# Patient Record
Sex: FEMALE | Race: WHITE
[De-identification: names, ages, dates, MRNs, and addresses within clinical notes are randomized per-mention and may not be internally consistent; named-entity substitution may affect disease eponyms.]

---

## 2018-02-22 ENCOUNTER — HOSPITAL ENCOUNTER (INPATIENT)
Dept: HOSPITAL 17 - NEPC | Age: 83
LOS: 8 days | Discharge: NURSING, REHAB OR LONG-TERM CARE | DRG: 492 | End: 2018-03-02
Attending: FAMILY MEDICINE | Admitting: FAMILY MEDICINE
Payer: MEDICARE

## 2018-02-22 VITALS — HEART RATE: 103 BPM | OXYGEN SATURATION: 98 % | RESPIRATION RATE: 18 BRPM

## 2018-02-22 VITALS
DIASTOLIC BLOOD PRESSURE: 57 MMHG | RESPIRATION RATE: 24 BRPM | TEMPERATURE: 99.2 F | HEART RATE: 95 BPM | OXYGEN SATURATION: 95 % | SYSTOLIC BLOOD PRESSURE: 124 MMHG

## 2018-02-22 VITALS
SYSTOLIC BLOOD PRESSURE: 124 MMHG | HEART RATE: 152 BPM | RESPIRATION RATE: 18 BRPM | OXYGEN SATURATION: 98 % | DIASTOLIC BLOOD PRESSURE: 89 MMHG

## 2018-02-22 VITALS
DIASTOLIC BLOOD PRESSURE: 58 MMHG | RESPIRATION RATE: 16 BRPM | SYSTOLIC BLOOD PRESSURE: 124 MMHG | HEART RATE: 87 BPM | OXYGEN SATURATION: 98 %

## 2018-02-22 VITALS — OXYGEN SATURATION: 98 %

## 2018-02-22 VITALS — OXYGEN SATURATION: 97 %

## 2018-02-22 VITALS
HEART RATE: 88 BPM | DIASTOLIC BLOOD PRESSURE: 73 MMHG | SYSTOLIC BLOOD PRESSURE: 124 MMHG | RESPIRATION RATE: 18 BRPM | OXYGEN SATURATION: 97 %

## 2018-02-22 VITALS
RESPIRATION RATE: 19 BRPM | HEART RATE: 98 BPM | OXYGEN SATURATION: 93 % | SYSTOLIC BLOOD PRESSURE: 119 MMHG | DIASTOLIC BLOOD PRESSURE: 75 MMHG

## 2018-02-22 VITALS
DIASTOLIC BLOOD PRESSURE: 104 MMHG | TEMPERATURE: 98.2 F | OXYGEN SATURATION: 98 % | SYSTOLIC BLOOD PRESSURE: 169 MMHG | HEART RATE: 89 BPM | RESPIRATION RATE: 18 BRPM

## 2018-02-22 VITALS — HEART RATE: 112 BPM

## 2018-02-22 VITALS
OXYGEN SATURATION: 97 % | HEART RATE: 84 BPM | SYSTOLIC BLOOD PRESSURE: 126 MMHG | RESPIRATION RATE: 18 BRPM | DIASTOLIC BLOOD PRESSURE: 64 MMHG

## 2018-02-22 VITALS
OXYGEN SATURATION: 98 % | RESPIRATION RATE: 19 BRPM | HEART RATE: 145 BPM | DIASTOLIC BLOOD PRESSURE: 92 MMHG | SYSTOLIC BLOOD PRESSURE: 115 MMHG

## 2018-02-22 VITALS
HEART RATE: 138 BPM | SYSTOLIC BLOOD PRESSURE: 132 MMHG | RESPIRATION RATE: 18 BRPM | DIASTOLIC BLOOD PRESSURE: 71 MMHG | OXYGEN SATURATION: 98 %

## 2018-02-22 VITALS — WEIGHT: 142.42 LBS | BODY MASS INDEX: 23.73 KG/M2 | HEIGHT: 65 IN

## 2018-02-22 VITALS
OXYGEN SATURATION: 99 % | RESPIRATION RATE: 18 BRPM | HEART RATE: 103 BPM | SYSTOLIC BLOOD PRESSURE: 132 MMHG | DIASTOLIC BLOOD PRESSURE: 72 MMHG

## 2018-02-22 VITALS — RESPIRATION RATE: 18 BRPM | HEART RATE: 138 BPM | OXYGEN SATURATION: 98 %

## 2018-02-22 VITALS
SYSTOLIC BLOOD PRESSURE: 130 MMHG | DIASTOLIC BLOOD PRESSURE: 87 MMHG | RESPIRATION RATE: 18 BRPM | OXYGEN SATURATION: 100 % | HEART RATE: 97 BPM

## 2018-02-22 VITALS
RESPIRATION RATE: 18 BRPM | SYSTOLIC BLOOD PRESSURE: 131 MMHG | DIASTOLIC BLOOD PRESSURE: 78 MMHG | HEART RATE: 151 BPM | OXYGEN SATURATION: 99 %

## 2018-02-22 DIAGNOSIS — F03.90: ICD-10-CM
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
## 2018-02-22 DIAGNOSIS — R62.7: ICD-10-CM

## 2018-02-22 DIAGNOSIS — I51.7: ICD-10-CM

## 2018-02-22 DIAGNOSIS — Z96.643: ICD-10-CM

## 2018-02-22 DIAGNOSIS — S82.202A: Primary | ICD-10-CM

## 2018-02-22 DIAGNOSIS — J81.0: ICD-10-CM

## 2018-02-22 DIAGNOSIS — Z99.3: ICD-10-CM
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
## 2018-02-22 DIAGNOSIS — S82.832A: ICD-10-CM

## 2018-02-22 DIAGNOSIS — Z78.1: ICD-10-CM
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
## 2018-02-22 DIAGNOSIS — D64.9: ICD-10-CM

## 2018-02-22 DIAGNOSIS — I48.91: ICD-10-CM

## 2018-02-22 DIAGNOSIS — R45.1: ICD-10-CM

## 2018-02-22 DIAGNOSIS — J81.1: ICD-10-CM

## 2018-02-22 DIAGNOSIS — I44.4: ICD-10-CM

## 2018-02-22 DIAGNOSIS — Z85.51: ICD-10-CM

## 2018-02-22 DIAGNOSIS — M80.062A: ICD-10-CM

## 2018-02-22 DIAGNOSIS — R13.10: ICD-10-CM

## 2018-02-22 DIAGNOSIS — K29.70: ICD-10-CM

## 2018-02-22 DIAGNOSIS — Z51.5: ICD-10-CM

## 2018-02-22 LAB
ALBUMIN SERPL-MCNC: 2.9 GM/DL (ref 3.4–5)
ALP SERPL-CCNC: 157 U/L (ref 45–117)
ALT SERPL-CCNC: 46 U/L (ref 10–53)
AST SERPL-CCNC: 46 U/L (ref 15–37)
BACTERIA #/AREA URNS HPF: (no result) /HPF
BASOPHILS # BLD AUTO: 0.1 TH/MM3 (ref 0–0.2)
BASOPHILS NFR BLD: 0.7 % (ref 0–2)
BILIRUB SERPL-MCNC: 0.6 MG/DL (ref 0.2–1)
BUN SERPL-MCNC: 18 MG/DL (ref 7–18)
CALCIUM SERPL-MCNC: 8.8 MG/DL (ref 8.5–10.1)
CHLORIDE SERPL-SCNC: 104 MEQ/L (ref 98–107)
COLOR UR: YELLOW
CREAT SERPL-MCNC: 0.69 MG/DL (ref 0.5–1)
EOSINOPHIL # BLD: 0 TH/MM3 (ref 0–0.4)
EOSINOPHIL NFR BLD: 0.3 % (ref 0–4)
ERYTHROCYTE [DISTWIDTH] IN BLOOD BY AUTOMATED COUNT: 17.1 % (ref 11.6–17.2)
GFR SERPLBLD BASED ON 1.73 SQ M-ARVRAT: 81 ML/MIN (ref 89–?)
GLUCOSE SERPL-MCNC: 114 MG/DL (ref 74–106)
GLUCOSE UR STRIP-MCNC: (no result) MG/DL
HCO3 BLD-SCNC: 28.5 MEQ/L (ref 21–32)
HCT VFR BLD CALC: 33.3 % (ref 35–46)
HGB BLD-MCNC: 10.8 GM/DL (ref 11.6–15.3)
HGB UR QL STRIP: (no result)
INR PPP: 1.1 RATIO
KETONES UR STRIP-MCNC: (no result) MG/DL
LYMPHOCYTES # BLD AUTO: 1.2 TH/MM3 (ref 1–4.8)
LYMPHOCYTES NFR BLD AUTO: 8.4 % (ref 9–44)
MAGNESIUM SERPL-MCNC: 2.2 MG/DL (ref 1.5–2.5)
MCH RBC QN AUTO: 27.3 PG (ref 27–34)
MCHC RBC AUTO-ENTMCNC: 32.4 % (ref 32–36)
MCV RBC AUTO: 84.3 FL (ref 80–100)
MONOCYTE #: 1.2 TH/MM3 (ref 0–0.9)
MONOCYTES NFR BLD: 8.3 % (ref 0–8)
MUCOUS THREADS #/AREA URNS LPF: (no result) /LPF
NEUTROPHILS # BLD AUTO: 11.4 TH/MM3 (ref 1.8–7.7)
NEUTROPHILS NFR BLD AUTO: 82.3 % (ref 16–70)
NITRITE UR QL STRIP: (no result)
PLATELET # BLD: 377 TH/MM3 (ref 150–450)
PMV BLD AUTO: 10.2 FL (ref 7–11)
PROT SERPL-MCNC: 7.9 GM/DL (ref 6.4–8.2)
PROTHROMBIN TIME: 11 SEC (ref 9.8–11.6)
RBC # BLD AUTO: 3.94 MIL/MM3 (ref 4–5.3)
SODIUM SERPL-SCNC: 138 MEQ/L (ref 136–145)
SP GR UR STRIP: 1.03 (ref 1–1.03)
SQUAMOUS #/AREA URNS HPF: <1 /HPF (ref 0–5)
TROPONIN I SERPL-MCNC: 0.08 NG/ML (ref 0.02–0.05)
URINE LEUKOCYTE ESTERASE: (no result)
WBC # BLD AUTO: 13.9 TH/MM3 (ref 4–11)

## 2018-02-22 PROCEDURE — 76000 FLUOROSCOPY <1 HR PHYS/QHP: CPT

## 2018-02-22 PROCEDURE — 73552 X-RAY EXAM OF FEMUR 2/>: CPT

## 2018-02-22 PROCEDURE — 93306 TTE W/DOPPLER COMPLETE: CPT

## 2018-02-22 PROCEDURE — 96374 THER/PROPH/DIAG INJ IV PUSH: CPT

## 2018-02-22 PROCEDURE — 85025 COMPLETE CBC W/AUTO DIFF WBC: CPT

## 2018-02-22 PROCEDURE — 73590 X-RAY EXAM OF LOWER LEG: CPT

## 2018-02-22 PROCEDURE — 85730 THROMBOPLASTIN TIME PARTIAL: CPT

## 2018-02-22 PROCEDURE — 80048 BASIC METABOLIC PNL TOTAL CA: CPT

## 2018-02-22 PROCEDURE — 87086 URINE CULTURE/COLONY COUNT: CPT

## 2018-02-22 PROCEDURE — 84484 ASSAY OF TROPONIN QUANT: CPT

## 2018-02-22 PROCEDURE — 80053 COMPREHEN METABOLIC PANEL: CPT

## 2018-02-22 PROCEDURE — 88312 SPECIAL STAINS GROUP 1: CPT

## 2018-02-22 PROCEDURE — C1713 ANCHOR/SCREW BN/BN,TIS/BN: HCPCS

## 2018-02-22 PROCEDURE — 81001 URINALYSIS AUTO W/SCOPE: CPT

## 2018-02-22 PROCEDURE — 87641 MR-STAPH DNA AMP PROBE: CPT

## 2018-02-22 PROCEDURE — 83735 ASSAY OF MAGNESIUM: CPT

## 2018-02-22 PROCEDURE — 96375 TX/PRO/DX INJ NEW DRUG ADDON: CPT

## 2018-02-22 PROCEDURE — 85014 HEMATOCRIT: CPT

## 2018-02-22 PROCEDURE — 71045 X-RAY EXAM CHEST 1 VIEW: CPT

## 2018-02-22 PROCEDURE — 88305 TISSUE EXAM BY PATHOLOGIST: CPT

## 2018-02-22 PROCEDURE — 85610 PROTHROMBIN TIME: CPT

## 2018-02-22 PROCEDURE — 93005 ELECTROCARDIOGRAM TRACING: CPT

## 2018-02-22 PROCEDURE — 82552 ASSAY OF CPK IN BLOOD: CPT

## 2018-02-22 PROCEDURE — 85018 HEMOGLOBIN: CPT

## 2018-02-22 PROCEDURE — 82550 ASSAY OF CK (CPK): CPT

## 2018-02-22 PROCEDURE — 85027 COMPLETE CBC AUTOMATED: CPT

## 2018-02-22 PROCEDURE — 72170 X-RAY EXAM OF PELVIS: CPT

## 2018-02-22 RX ADMIN — PHENYTOIN SODIUM SCH MLS/HR: 50 INJECTION INTRAMUSCULAR; INTRAVENOUS at 14:48

## 2018-02-22 RX ADMIN — Medication SCH ML: at 20:56

## 2018-02-22 RX ADMIN — MORPHINE SULFATE PRN MG: 2 INJECTION, SOLUTION INTRAMUSCULAR; INTRAVENOUS at 23:19

## 2018-02-22 RX ADMIN — PHENYTOIN SODIUM SCH MLS/HR: 50 INJECTION INTRAMUSCULAR; INTRAVENOUS at 23:18

## 2018-02-22 RX ADMIN — SODIUM CHLORIDE PRN MLS/HR: 900 INJECTION, SOLUTION INTRAVENOUS at 13:17

## 2018-02-22 RX ADMIN — STANDARDIZED SENNA CONCENTRATE AND DOCUSATE SODIUM SCH TAB: 8.6; 5 TABLET, FILM COATED ORAL at 20:56

## 2018-02-22 RX ADMIN — SODIUM CHLORIDE PRN MLS/HR: 900 INJECTION, SOLUTION INTRAVENOUS at 21:58

## 2018-02-22 NOTE — RADRPT
EXAM DATE/TIME:  02/22/2018 10:40 

 

[facility] COMPARISON:     

No previous studies available for comparison.

 

                     

INDICATIONS :     

Shortness of breath.

                     

 

MEDICAL HISTORY :     

Carcinoma, bladder.          

 

SURGICAL HISTORY :        

Bilateral hip replacement

 

ENCOUNTER:     

Initial                                        

 

ACUITY:     

1 day      

 

PAIN SCORE:     

0/10

 

LOCATION:     

Bilateral chest 

 

FINDINGS:     

The heart is enlarged.  There is engorgement and indistinctness of the central bronchopulmonary sebastian
ngs.  This is a supine view and there is equalization of flow between the upper and lower lobes.  The
re is some mild peribronchial thickening.  No focal consolidated infiltrate seen.  Both hemidiaphragm
s are well delineated.

 

CONCLUSION:     

1. No infiltrates seen.

2. Cardiomegaly and fullness of the central bronchopulmonary markings suggest pulmonary venous hypert
ension.

 

 

 

 Dylan Candelaria MD on February 22, 2018 at 11:34           

Board Certified Radiologist.

 This report was verified electronically.

## 2018-02-22 NOTE — HHI.HP
HPI


Service


Family Medicine


Primary Care Physician


Jhon Betts MD


Admission Diagnosis





Diagnoses:  


Chief Complaint:  


leg pain from fall


International Travel<30 Days:  No


Contact w/Intl Traveler<30days:  No


Known Affected Area:  No


History of Present Illness


Ms Alvarenga is an 81YO female w/PMHx of dementia who presents from [facility] 

after a fall this morning with left distal tibia and fibula fractures on 

imaging and new onset afib RVR. Prashanth Sidhu, her son-in-law and HCPOA for Ms Alvarenga, reports that she fell off of a chair this morning at the [facility]. She is 

attended in the room by her two grandchildren, Cliff and Erniqueta Sidhu who cannot 

provide any other information other than that Ms Alvarenga has PMHx of dementia dx 

around 2011, and that she is pretty healthy and does not take any medications. 

The pt is not oriented to person, place or time but states she is not in pain; 

however, when the HOB is elevated starts stating, stop doing that, it hurts. 

Heart rate on the monitor is elevated to 170s-180s. Dr Velásquez has been called 

for orthopedic consult.


 (Dheeraj Hurd MD R1)





Review of Systems


ROS Limitations:  Clinical Condition (dementia)


 (Dheeraj Hurd MD R1)





Past Family Social History


Past Medical History


dementia


FROOSH fractures of both hands/wrists several years ago


Past Surgical History


unknown


Reported Medications





Reported Meds & Active Scripts


Active


No Active Prescriptions or Reported Medications    


 (Dheeraj Hurd MD R1)


Allergies:  


Coded Allergies:  


     No Known Allergies (Unverified , 2/22/18)


Active Ordered Medications





Current Medications








 Medications


  (Trade)  Dose


 Ordered  Sig/César


 Route  Start Time


 Stop Time Status Last Admin


 


  (NS Flush)  2 ml  UNSCH  PRN


 IVF  2/22/18 10:15


     


 


 


 Diltiazem HCl 125


 mg/Sodium Chloride  125 ml @ 5


 mls/hr  TITRATE  PRN


 IV  2/22/18 12:45


   UNV  


 








Family History


unknown


Social History


Daughter and son-in-law are HCPOA


Lives in [facility]


 (Dheeraj Hurd MD R1)





Physical Exam


Vital Signs





Vital Signs








  Date Time  Temp Pulse Resp B/P (MAP) Pulse Ox O2 Delivery O2 Flow Rate FiO2


 


2/22/18 11:48  138 18  98 Room Air  


 


2/22/18 10:57  103 18  98 Room Air  


 


2/22/18 10:15     98 Room Air  


 


2/22/18 09:12 98.2 89 18 169/104 (125 98   








Physical Exam


GENERAL: This is a thin, elderly female mildly agitated in bed, constantly 

grasping at her abdomen and the bed coverings.


SKIN: No rashes, ecchymoses or lesions. Cool and dry.


HEAD: Atraumatic. Normocephalic. No temporal or scalp tenderness.


EYES: Pupils equal round and reactive. No scleral icterus. No injection or 

drainage. 


ENT: Nose without drainage. Uvula midline. Airway patent.


NECK: Trachea midline. No JVD or lymphadenopathy. Supple, nontender, no 

meningeal signs.


CARDIOVASCULAR: Tachycardic with irregularly irregular rhythm without murmur, 

gallop, or rub. 


RESPIRATORY: Clear to auscultation. Breath sounds equal bilaterally. No wheezes

, rales, or rhonchi.  


GASTROINTESTINAL: Abdomen soft, non-tender, nondistended. No hepato-splenomegaly

, or palpable masses. No guarding.


MUSCULOSKELETAL: Extremities without clubbing, cyanosis, or edema. No edema of 

RLE. LLE in boot and ace bandage to the knee. No right calf tenderness. 


NEUROLOGICAL: Awake. Not oriented. Motor and sensory grossly within normal 

limits. Can speak.


Laboratory





Laboratory Tests








Test


  2/22/18


09:35 2/22/18


11:10


 


White Blood Count 13.9  


 


Red Blood Count 3.94  


 


Hemoglobin 10.8  


 


Hematocrit 33.3  


 


Mean Corpuscular Volume 84.3  


 


Mean Corpuscular Hemoglobin 27.3  


 


Mean Corpuscular Hemoglobin


Concent 32.4 


  


 


 


Red Cell Distribution Width 17.1  


 


Platelet Count 377  


 


Mean Platelet Volume 10.2  


 


Neutrophils (%) (Auto) 82.3  


 


Lymphocytes (%) (Auto) 8.4  


 


Monocytes (%) (Auto) 8.3  


 


Eosinophils (%) (Auto) 0.3  


 


Basophils (%) (Auto) 0.7  


 


Neutrophils # (Auto) 11.4  


 


Lymphocytes # (Auto) 1.2  


 


Monocytes # (Auto) 1.2  


 


Eosinophils # (Auto) 0.0  


 


Basophils # (Auto) 0.1  


 


CBC Comment DIFF FINAL  


 


Differential Comment   


 


Urine Color YELLOW  


 


Urine Turbidity CLEAR  


 


Urine pH 5.5  


 


Urine Specific Gravity 1.028  


 


Urine Protein 30  


 


Urine Glucose (UA) NEG  


 


Urine Ketones NEG  


 


Urine Occult Blood NEG  


 


Urine Nitrite NEG  


 


Urine Bilirubin NEG  


 


Urine Urobilinogen 2.0  


 


Urine Leukocyte Esterase TRACE  


 


Urine RBC 2  


 


Urine WBC 1  


 


Urine Squamous Epithelial


Cells <1 


  


 


 


Urine Bacteria MOD  


 


Urine Mucus FEW  


 


Microscopic Urinalysis Comment


  CATH-CULTURE


IND 


 


 


Blood Urea Nitrogen 18  


 


Creatinine 0.69  


 


Random Glucose 114  


 


Total Protein 7.9  


 


Albumin 2.9  


 


Calcium Level 8.8  


 


Alkaline Phosphatase 157  


 


Aspartate Amino Transf


(AST/SGOT) 46 


  


 


 


Alanine Aminotransferase


(ALT/SGPT) 46 


  


 


 


Total Bilirubin 0.6  


 


Sodium Level 138  


 


Potassium Level 3.9  


 


Chloride Level 104  


 


Carbon Dioxide Level 28.5  


 


Anion Gap 6  


 


Estimat Glomerular Filtration


Rate 81 


  


 


 


Prothrombin Time  11.0 


 


Prothromb Time International


Ratio 


  1.1 


 


 


Activated Partial


Thromboplast Time 


  22.5 


 


 


Magnesium Level  2.2 


 


Total Creatine Kinase  247 


 


Creatine Kinase MB  2.1 


 


Creatine Kinase MB %  0.9 


 


Troponin I  0.08 














 Date/Time


Source Procedure


Growth Status


 


 


 2/22/18 09:35


Urine Catheterized Urine Urine Culture


Pending Received








 (Dheeraj Hurd MD R1)


Result Diagram:  


2/22/18 0935                                                                   

             2/22/18 0935





Imaging





Last Impressions








Chest X-Ray 2/22/18 1006 Signed





Impressions: 





 Service Date/Time:  Thursday, February 22, 2018 10:40 - CONCLUSION:  1. No 





 infiltrates seen. 2. Cardiomegaly and fullness of the central bronchopulmonary 





 markings suggest pulmonary venous hypertension.     Dylan Candelaria MD 


 


Tibia/Fibula X-Ray 2/22/18 0000 Signed





Impressions: 





 Service Date/Time:  Thursday, February 22, 2018 10:27 - CONCLUSION:  

Comminuted 





 and angulated fractures of the distal one third shaft of the tibia and fibula.

   





   Dylan Candelaria MD 


 


Pelvis X-Ray 2/22/18 0000 Signed





Impressions: 





 Service Date/Time:  Thursday, February 22, 2018 10:21 - CONCLUSION:  The bony 





 pelvic ring is grossly intact.  Symmetric appearance of bilateral total hip 





 arthroplasty.     Dylan Candelaria MD 


 


Femur X-Ray 2/22/18 0000 Signed





Impressions: 





 Service Date/Time:  Thursday, February 22, 2018 10:23 - CONCLUSION:  1. No 





 fracture seen. 2. Possible knee effusion.     Dylan Candelaria MD 








 (Dheeraj Hurd MD R1)





Septic Shock Reassessment


Septic shock perfusion:  reassessment completed


 (Dheeraj Hurd MD R1)





Caprini VTE Risk Assessment


Caprini VTE Risk Assessment:  Mod/High Risk (score >= 2)


 (Dheeraj Hurd MD R1)





Assessment and Plan


Assessment and Plan


81YO female w/PMHx dementia presents from [facility] with comminuted fractures of 

distal 1/3 of left tibia and fibula and new onset atrail fibrillation with RVR.


grandchildren are Enriqueta and Cliff Sidhu is HCPOA [phone]





PLAN:


Admit to inpatient


Orthopedics consult (Mr Velásquez) -- appreciate recs/surgical management


NPO until ortho decides on surgery


Pain control


SCD on RLE for mechanical AC until possible surgery


Cardiology consult -- appreciate recs


Diltiazem drip


Tele


Code Status


Full Code


Discussed Condition With


Seen and dw Eduar Quarles and POONAM Tuttle


 (Dheeraj Hurd MD R1)


Attending Attestation


Patient seen and examined.  Case reviewed and discussed with the resident team.

  Agree with plan of care as discussed with me and documented in the resident 

note.


she was seen in the ED by me with her 2 grandchildren who provided the history


 (Jessica Quarles MD)


Problem List:  


(1) Fracture, tibia and fibula, shaft


ICD Codes:  S82.209A - Unspecified fracture of shaft of unspecified tibia, 

initial encounter for closed fracture; S82.409A - Unspecified fracture of shaft 

of unspecified fibula, initial encounter for closed fracture


Status:  Acute


Plan:  Left Tibia and fibula x-rays with comminuted and angulated fractures of 

the distal one third shaft of the tibia and fibula.





-Orthopedics consult (Dr Velásquez) -- appreciate recs 


-NPO


-Morphine 2mg q3h IV pain 1-10


-Morphine 5mg q3h IV breakthru pain


-Zofran 4mg IV q8h PRN nausea


-Tylenol 650mg PO q6h fever


-Halina-colace 1 tab PO BID 


-PRN bowel regimen


-SCD on RLE for mechanical anticoagulation for now





(2) Atrial fibrillation with RVR


ICD Codes:  I48.91 - Unspecified atrial fibrillation


Status:  Acute


Plan:  New onset Afib with RVR with rate in 150s; likely related to tibia and 

fibula fractures as above





-ED gave Dilt 10mg once


-Dilt gtt infusion protocol


-Cardiology consult--appreciate recs





(3) Dementia


ICD Codes:  F03.90 - Unspecified dementia without behavioral disturbance


Status:  Chronic


Plan:  Stable. Takes no medications at [facility]





(4) Agitation


ICD Codes:  R45.1 - Restlessness and agitation


Status:  Acute


Plan:  Pt repeating phrase "help me, help me" while continuing to try and get up

, and grasping at bed covers





-Redirection as necessary


-Haldol 0.5mg IV once





(5) FEN/GI/PPx


Status:  Acute


Plan:  Fluids: NS IVF @110ml/hr


Electrolytes: will monitor and replete as necessary


Nutrition: NPO for now


GI: not indicated


PPx: SCD on RLE; will await surgery for further decision on chemo 

anticoagulation





CM to assist post-op


PT tomorrow to eval and treat


 (Dheeraj Hurd MD R1)





Physician Certification


2 Midnight Certification Type:  Admission for Inpatient Services


Order for Inpatient Services


The services are ordered in accordance with Medicare regulations or non-

Medicare payer requirements, as applicable.  In the case of services not 

specified as inpatient-only, they are appropriately provided as inpatient 

services in accordance with the 2-midnight benchmark.


Estimated LOS (days):  3


 days is the estimated time the patient will need to remain in the hospital, 

assuming treatment plan goals are met and no additional complications.


Post-Hospital Plan:  Not yet determined


 (Dheeraj Hurd MD R1)





Problem Qualifiers





(1) Fracture, tibia and fibula, shaft:  


Qualified Codes:  S82.202A - Unspecified fracture of shaft of left tibia, 

initial encounter for closed fracture; S82.402A - Unspecified fracture of shaft 

of left fibula, initial encounter for closed fracture


(2) Dementia:  


Qualified Codes:  F03.90 - Unspecified dementia without behavioral disturbance








Dheeraj Hurd MD R1 Feb 22, 2018 12:55


Jessica Quarles MD Feb 23, 2018 15:12

## 2018-02-22 NOTE — EKG
Date Performed: 02/22/2018       Time Performed: 10:00:12

 

PTAGE:      82 years

 

EKG:      ATRIAL FIBRILLATION WITH RAPID VENTRICULAR RESPONSE POSSIBLE RIGHT VENTRICULAR CONDUCTION D
ELAY LEFT ANTERIOR FASCICULAR BLOCK SEPTAL MYOCARDIAL INFARCTION ABNORMAL ECG 

 

NO PREVIOUS TRACING            

 

DOCTOR:   Sue Vivar  Interpretating Date/Time  02/22/2018 19:38:08

## 2018-02-22 NOTE — PD
HPI


Chief Complaint:  Fall


Time Seen by Provider:  09:55


Travel History


International Travel<30 days:  No


Contact w/Intl Traveler<30days:  No


Traveled to known affect area:  No





History of Present Illness


HPI


This patient is a demented assisted living facility patient.  She is wheelchair-

bound.  She had a fall onto her left leg today.  It happened during transfer.  

She is not able to provide any useful history or review of systems.  She does 

not have any medical problems beyond advanced dementia and takes no 

medications.  However she presents in a new onset A. fib with RVR with heart 

rate of 160.  She has left leg deformity and pain.  Duration 1 hour.  No 

alleviating factors.  No exacerbating factors.  Symptoms of moderate severity





PFSH


Past Medical History


Cancer:  Yes ([facility])


Dementia:  Yes


Diminished Hearing:  No


Immunizations Current:  No


Pregnant?:  Not Pregnant





Past Surgical History


Genitourinary Surgery:  Yes ([facility]AD[facility] CA)


Joint Replacement:  Yes (BI LAT HIP)





Social History


Alcohol Use:  No


Tobacco Use:  No


Substance Use:  Yes (PAST ETOH ABUSE)





Allergies-Medications


(Allergen,Severity, Reaction):  


Coded Allergies:  


     No Known Allergies (Unverified , 2/22/18)


Reported Meds & Prescriptions





Reported Meds & Active Scripts


Active


No Active Prescriptions or Reported Medications    








Review of Systems


ROS Limitations:  Clinical Condition, Uncooperative, Poor Historian





Physical Exam


Narrative


GENERAL: Elderly demented  well-developed patient in no apparent distress.


SKIN: Focused skin assessment reveals no rash and nodules. Skin is Warm and dry.


HEAD: Atraumatic. Normocephalic. 


EYES: Pupils equal and round. No scleral icterus. No injection or drainage. 


ENT: No nasal bleeding or discharge.  Mucous membranes pink and moist.


NECK: Trachea midline. No JVD. 


CARDIOVASCULAR: Irregularly irregular  rhythm.  No murmur appreciated.  Rate 160


RESPIRATORY: No accessory muscle use. Clear to auscultation. Breath sounds 

equal bilaterally. 


GASTROINTESTINAL: Abdomen soft, non-tender, nondistended. Hepatic and splenic 

margins not palpable. 


MUSCULOSKELETAL: Has left leg deformity.  There is crepitus of the distal 

tibia.  No open wound.  Pulses are not palpable in either foot but readily 

obtainable with Doppler bilaterally.  There is some pigment change and edema 

which look chronic in the left leg and confirmed by the .

  Left leg looks shortened and internally rotated.  no clubbing.  No cyanosis.  

No edema. 


NEUROLOGICAL: Awake and alert. No obvious cranial nerve deficits.  Motor 

grossly within normal limits. Normal speech.


PSYCHIATRIC: Appropriate mood and affect; insight and judgment poor .





Data


Data


Last Documented VS





Vital Signs








  Date Time  Temp Pulse Resp B/P (MAP) Pulse Ox O2 Delivery O2 Flow Rate FiO2


 


2/22/18 11:48  138 18  98 Room Air  


 


2/22/18 09:12 98.2       








Orders





 Orders


Complete Blood Count With Diff (2/22/18 09:40)


Comprehensive Metabolic Panel (2/22/18 09:40)


Urinalysis - C+S If Indicated (2/22/18 09:40)


Urine Culture (2/22/18 09:35)


Electrocardiogram (2/22/18 10:06)


Ckmb (Isoenzyme) Profile (2/22/18 10:06)


Magnesium (Mg) (2/22/18 10:06)


Prothrombin Time / Inr (Pt) (2/22/18 10:06)


Act Partial Throm Time (Ptt) (2/22/18 10:06)


Troponin I (2/22/18 10:06)


Chest, Single Ap (2/22/18 10:06)


Ecg Monitoring (2/22/18 10:06)


Iv Access Insert/Monitor (2/22/18 10:06)


Oximetry (2/22/18 10:06)


Sodium Chloride 0.9% Flush (Ns Flush) (2/22/18 10:15)


Diltiazem Inj (Cardizem Inj) (2/22/18 10:15)


Femur (Ap & Lat/2vws) (2/22/18 )


Pelvis, Ap Only (Routine) (2/22/18 )


Tibia/Fibula (Ap/Lat) (2/22/18 )


CKMB (2/22/18 11:10)


CKMB% (2/22/18 11:10)





Labs





Laboratory Tests








Test


  2/22/18


09:35 2/22/18


11:10


 


White Blood Count 13.9 TH/MM3  


 


Red Blood Count 3.94 MIL/MM3  


 


Hemoglobin 10.8 GM/DL  


 


Hematocrit 33.3 %  


 


Mean Corpuscular Volume 84.3 FL  


 


Mean Corpuscular Hemoglobin 27.3 PG  


 


Mean Corpuscular Hemoglobin


Concent 32.4 % 


  


 


 


Red Cell Distribution Width 17.1 %  


 


Platelet Count 377 TH/MM3  


 


Mean Platelet Volume 10.2 FL  


 


Neutrophils (%) (Auto) 82.3 %  


 


Lymphocytes (%) (Auto) 8.4 %  


 


Monocytes (%) (Auto) 8.3 %  


 


Eosinophils (%) (Auto) 0.3 %  


 


Basophils (%) (Auto) 0.7 %  


 


Neutrophils # (Auto) 11.4 TH/MM3  


 


Lymphocytes # (Auto) 1.2 TH/MM3  


 


Monocytes # (Auto) 1.2 TH/MM3  


 


Eosinophils # (Auto) 0.0 TH/MM3  


 


Basophils # (Auto) 0.1 TH/MM3  


 


CBC Comment DIFF FINAL  


 


Differential Comment   


 


Urine Color YELLOW  


 


Urine Turbidity CLEAR  


 


Urine pH 5.5  


 


Urine Specific Gravity 1.028  


 


Urine Protein 30 mg/dL  


 


Urine Glucose (UA) NEG mg/dL  


 


Urine Ketones NEG mg/dL  


 


Urine Occult Blood NEG  


 


Urine Nitrite NEG  


 


Urine Bilirubin NEG  


 


Urine Urobilinogen 2.0 MG/DL  


 


Urine Leukocyte Esterase TRACE  


 


Urine RBC 2 /hpf  


 


Urine WBC 1 /hpf  


 


Urine Squamous Epithelial


Cells <1 /hpf 


  


 


 


Urine Bacteria MOD /hpf  


 


Urine Mucus FEW /lpf  


 


Microscopic Urinalysis Comment


  CATH-CULTURE


IND 


 


 


Blood Urea Nitrogen 18 MG/DL  


 


Creatinine 0.69 MG/DL  


 


Random Glucose 114 MG/DL  


 


Total Protein 7.9 GM/DL  


 


Albumin 2.9 GM/DL  


 


Calcium Level 8.8 MG/DL  


 


Alkaline Phosphatase 157 U/L  


 


Aspartate Amino Transf


(AST/SGOT) 46 U/L 


  


 


 


Alanine Aminotransferase


(ALT/SGPT) 46 U/L 


  


 


 


Total Bilirubin 0.6 MG/DL  


 


Sodium Level 138 MEQ/L  


 


Potassium Level 3.9 MEQ/L  


 


Chloride Level 104 MEQ/L  


 


Carbon Dioxide Level 28.5 MEQ/L  


 


Anion Gap 6 MEQ/L  


 


Estimat Glomerular Filtration


Rate 81 ML/MIN 


  


 


 


Prothrombin Time  11.0 SEC 


 


Prothromb Time International


Ratio 


  1.1 RATIO 


 


 


Activated Partial


Thromboplast Time 


  22.5 SEC 


 


 


Magnesium Level  2.2 MG/DL 


 


Total Creatine Kinase  247 U/L 


 


Creatine Kinase MB  2.1 NG/ML 


 


Creatine Kinase MB %  0.9 % 


 


Troponin I  0.08 NG/ML 











[facility]


Medical Decision Making


Medical Screen Exam Complete:  Yes


Emergency Medical Condition:  Yes


Medical Record Reviewed:  Yes


Differential Diagnosis


Tibia fracture, hip fracture, pelvic fracture, new onset A. fib with RVR, SVT


Narrative Course


I have reviewed the patient's electronic medical record.  Reviewed her nursing 

home paperwork and spoke with the .  She takes no meds and has 

advanced dementia and wheelchair-bound





IV placed


Labs sent


Extended cardiac monitoring confirms A. fib with RVR


I reviewed her EKG which shows A. fib with rate in the 140s





I gave her a dose of 15 mg IV Cardizem for rate control


I have ordered in a series of x-rays





I reviewed all the x-rays.  She has a spiral fracture of the distal tib-fib





Procedure note:


I performed a closed reduction of the left leg tibia fracture.  Is very 

deformed.


Total bedside time 15 minutes for this


I discussed it with her grandson at bedside


An assistant stabilize the upper tibia while I applied traction and rotated the 

distal tibia a bit and brought it in line.


We applied a posterior long-leg splint


Pulses are able to be found with Doppler


This was tolerated well.  No anesthesia was needed.  She is very demented and 

did not appear to be in significant pain.





Patient remains in A. fib with rapid response but improved.  I reviewed her 

labs.  I discussed with medical residents will admit


I discussed with orthopedic PA who will plan for operative repair of this leg





Diagnosis





 Primary Impression:  


 Atrial fibrillation with RVR


 Additional Impressions:  


 New onset a-fib


 Fracture, tibia and fibula, shaft


 Qualified Codes:  S82.202A - Unspecified fracture of shaft of left tibia, 

initial encounter for closed fracture; S82.402A - Unspecified fracture of shaft 

of left fibula, initial encounter for closed fracture





Admitting Information


Admitting Physician Requests:  Admit


Scripts


No Active Prescriptions or Reported Meds











Per Blanca MD Feb 22, 2018 10:20

## 2018-02-22 NOTE — RADRPT
EXAM DATE/TIME:  02/22/2018 10:23 

 

[facility] COMPARISON:     

No previous studies available for comparison.

 

                     

INDICATIONS :     

Left femur pain, fall.

                     

 

MEDICAL HISTORY :     

Carcinoma, bladder.          

 

SURGICAL HISTORY :        

Bilateral hip replacements.

 

ENCOUNTER:     

Initial                                        

 

ACUITY:     

1 day      

 

PAIN SCORE:     

Non-responsive.

 

LOCATION:     

Left proximal femur

 

FINDINGS:     

Noncemented total hip arthroplasty the top position without evidence of dislocation.  Advanced degene
rative changes in the knee with narrowing of the lateral compartment and significant superior patella
r osteophytes.  The suprapatellar soft tissues are mildly prominent, nonspecific with regard to the e
ffusion.

 

CONCLUSION:     

1. No fracture seen.

2. Possible knee effusion.

 

 

 

 Dylan Candelaria MD on February 22, 2018 at 11:32           

Board Certified Radiologist.

 This report was verified electronically.

## 2018-02-22 NOTE — RADRPT
EXAM DATE/TIME:  02/22/2018 10:21 

 

[facility] COMPARISON:     

No previous studies available for comparison.

 

                     

INDICATIONS :     

Pelvic pain, fall.

                     

 

MEDICAL HISTORY :     

Carcinoma, bladder.          

 

SURGICAL HISTORY :        

Bilateral hip replacements

 

ENCOUNTER:     

Initial                                        

 

ACUITY:     

1 day      

 

PAIN SCORE:     

2/10

 

LOCATION:     

Left  pelvis

 

FINDINGS:     

Frontal view of the pelvis demonstrates the bony pelvic ring to be grossly intact.  There are bilater
al noncemented total hip arthroplasty with shortness of breath trick appearance between left and righ
t side.  The hardware appears intact.  No fracture seen.

 

 

CONCLUSION:     

The bony pelvic ring is grossly intact.  Symmetric appearance of bilateral total hip arthroplasty.

 

 

 

 Dylan Candelaria MD on February 22, 2018 at 11:28           

Board Certified Radiologist.

 This report was verified electronically.

## 2018-02-22 NOTE — RADRPT
EXAM DATE/TIME:  02/22/2018 10:27 

 

[facility] COMPARISON:     

No previous studies available for comparison.

 

                     

INDICATIONS :     

Left leg pain, fall.

                     

 

MEDICAL HISTORY :     

Carcinoma, bladder.          

 

SURGICAL HISTORY :        

Bilateral hip replacements

 

ENCOUNTER:     

Initial                                        

 

ACUITY:     

1 day      

 

PAIN SCORE:     

10/10

 

LOCATION:     

Left distal tibia

 

FINDINGS:     

One fractures of the distal and showed shaft of the tibia and fibula with mild comminution and signif
icant medial angulation of the distal fracture fragments.  No radiopaque foreign bodies.

 

CONCLUSION:     

Comminuted and angulated fractures of the distal one third shaft of the tibia and fibula.

 

 

 

 Dylan Candelaria MD on February 22, 2018 at 11:33           

Board Certified Radiologist.

 This report was verified electronically.

## 2018-02-23 VITALS — HEART RATE: 73 BPM

## 2018-02-23 VITALS
DIASTOLIC BLOOD PRESSURE: 56 MMHG | SYSTOLIC BLOOD PRESSURE: 98 MMHG | RESPIRATION RATE: 17 BRPM | OXYGEN SATURATION: 97 % | HEART RATE: 89 BPM | TEMPERATURE: 98.8 F

## 2018-02-23 VITALS
DIASTOLIC BLOOD PRESSURE: 56 MMHG | SYSTOLIC BLOOD PRESSURE: 97 MMHG | TEMPERATURE: 98.5 F | OXYGEN SATURATION: 96 % | HEART RATE: 74 BPM | RESPIRATION RATE: 14 BRPM

## 2018-02-23 VITALS — OXYGEN SATURATION: 95 %

## 2018-02-23 VITALS
SYSTOLIC BLOOD PRESSURE: 113 MMHG | HEART RATE: 116 BPM | DIASTOLIC BLOOD PRESSURE: 56 MMHG | TEMPERATURE: 98.6 F | OXYGEN SATURATION: 99 % | RESPIRATION RATE: 19 BRPM

## 2018-02-23 VITALS
OXYGEN SATURATION: 100 % | TEMPERATURE: 98.3 F | DIASTOLIC BLOOD PRESSURE: 56 MMHG | RESPIRATION RATE: 16 BRPM | SYSTOLIC BLOOD PRESSURE: 105 MMHG | HEART RATE: 90 BPM

## 2018-02-23 VITALS
OXYGEN SATURATION: 99 % | TEMPERATURE: 98.6 F | RESPIRATION RATE: 19 BRPM | SYSTOLIC BLOOD PRESSURE: 113 MMHG | DIASTOLIC BLOOD PRESSURE: 56 MMHG | HEART RATE: 87 BPM

## 2018-02-23 VITALS
HEART RATE: 83 BPM | DIASTOLIC BLOOD PRESSURE: 56 MMHG | SYSTOLIC BLOOD PRESSURE: 122 MMHG | TEMPERATURE: 97.6 F | OXYGEN SATURATION: 97 % | RESPIRATION RATE: 18 BRPM

## 2018-02-23 VITALS — OXYGEN SATURATION: 99 %

## 2018-02-23 VITALS
RESPIRATION RATE: 19 BRPM | DIASTOLIC BLOOD PRESSURE: 60 MMHG | SYSTOLIC BLOOD PRESSURE: 129 MMHG | HEART RATE: 87 BPM | TEMPERATURE: 98.1 F | OXYGEN SATURATION: 97 %

## 2018-02-23 VITALS — HEART RATE: 116 BPM

## 2018-02-23 VITALS — HEART RATE: 82 BPM

## 2018-02-23 VITALS — HEART RATE: 94 BPM

## 2018-02-23 LAB
ALBUMIN SERPL-MCNC: 2.5 GM/DL (ref 3.4–5)
ALP SERPL-CCNC: 133 U/L (ref 45–117)
ALT SERPL-CCNC: 31 U/L (ref 10–53)
AST SERPL-CCNC: 27 U/L (ref 15–37)
BASOPHILS # BLD AUTO: 0.1 TH/MM3 (ref 0–0.2)
BASOPHILS NFR BLD: 1 % (ref 0–2)
BILIRUB SERPL-MCNC: 0.8 MG/DL (ref 0.2–1)
BUN SERPL-MCNC: 13 MG/DL (ref 7–18)
CALCIUM SERPL-MCNC: 8.2 MG/DL (ref 8.5–10.1)
CHLORIDE SERPL-SCNC: 107 MEQ/L (ref 98–107)
CREAT SERPL-MCNC: 0.59 MG/DL (ref 0.5–1)
EOSINOPHIL # BLD: 0.1 TH/MM3 (ref 0–0.4)
EOSINOPHIL NFR BLD: 1.3 % (ref 0–4)
ERYTHROCYTE [DISTWIDTH] IN BLOOD BY AUTOMATED COUNT: 17.1 % (ref 11.6–17.2)
GFR SERPLBLD BASED ON 1.73 SQ M-ARVRAT: 98 ML/MIN (ref 89–?)
GLUCOSE SERPL-MCNC: 92 MG/DL (ref 74–106)
HCO3 BLD-SCNC: 26.7 MEQ/L (ref 21–32)
HCT VFR BLD CALC: 27 % (ref 35–46)
HCT VFR BLD CALC: 28.8 % (ref 35–46)
HGB BLD-MCNC: 8.6 GM/DL (ref 11.6–15.3)
HGB BLD-MCNC: 9.3 GM/DL (ref 11.6–15.3)
LYMPHOCYTES # BLD AUTO: 1.6 TH/MM3 (ref 1–4.8)
LYMPHOCYTES NFR BLD AUTO: 18.3 % (ref 9–44)
MCH RBC QN AUTO: 27.3 PG (ref 27–34)
MCHC RBC AUTO-ENTMCNC: 32.4 % (ref 32–36)
MCV RBC AUTO: 84.2 FL (ref 80–100)
MONOCYTE #: 1 TH/MM3 (ref 0–0.9)
MONOCYTES NFR BLD: 10.9 % (ref 0–8)
NEUTROPHILS # BLD AUTO: 6.2 TH/MM3 (ref 1.8–7.7)
NEUTROPHILS NFR BLD AUTO: 68.5 % (ref 16–70)
PLATELET # BLD: 302 TH/MM3 (ref 150–450)
PMV BLD AUTO: 10.2 FL (ref 7–11)
PROT SERPL-MCNC: 6.6 GM/DL (ref 6.4–8.2)
RBC # BLD AUTO: 3.42 MIL/MM3 (ref 4–5.3)
SODIUM SERPL-SCNC: 139 MEQ/L (ref 136–145)
WBC # BLD AUTO: 9 TH/MM3 (ref 4–11)

## 2018-02-23 PROCEDURE — 0QSH06Z REPOSITION LEFT TIBIA WITH INTRAMEDULLARY INTERNAL FIXATION DEVICE, OPEN APPROACH: ICD-10-PCS | Performed by: ORTHOPAEDIC SURGERY

## 2018-02-23 RX ADMIN — Medication SCH ML: at 08:18

## 2018-02-23 RX ADMIN — Medication SCH ML: at 20:25

## 2018-02-23 RX ADMIN — PHENYTOIN SODIUM SCH MLS/HR: 50 INJECTION INTRAMUSCULAR; INTRAVENOUS at 13:42

## 2018-02-23 RX ADMIN — STANDARDIZED SENNA CONCENTRATE AND DOCUSATE SODIUM SCH TAB: 8.6; 5 TABLET, FILM COATED ORAL at 20:17

## 2018-02-23 RX ADMIN — CALCIUM CARBONATE-CHOLECALCIFEROL TAB 250 MG-125 UNIT SCH MG: 250-125 TAB at 18:00

## 2018-02-23 RX ADMIN — PHENYTOIN SODIUM SCH MLS/HR: 50 INJECTION INTRAMUSCULAR; INTRAVENOUS at 07:13

## 2018-02-23 RX ADMIN — OXYTOCIN SCH MLS/HR: 10 INJECTION, SOLUTION INTRAMUSCULAR; INTRAVENOUS at 19:47

## 2018-02-23 RX ADMIN — CEFAZOLIN SODIUM SCH MLS/HR: 2 SOLUTION INTRAVENOUS at 17:00

## 2018-02-23 RX ADMIN — MORPHINE SULFATE PRN MG: 2 INJECTION, SOLUTION INTRAMUSCULAR; INTRAVENOUS at 23:48

## 2018-02-23 RX ADMIN — STANDARDIZED SENNA CONCENTRATE AND DOCUSATE SODIUM SCH TAB: 8.6; 5 TABLET, FILM COATED ORAL at 08:18

## 2018-02-23 RX ADMIN — CEFAZOLIN SODIUM SCH MLS/HR: 2 SOLUTION INTRAVENOUS at 23:48

## 2018-02-23 RX ADMIN — OXYTOCIN SCH MLS/HR: 10 INJECTION, SOLUTION INTRAMUSCULAR; INTRAVENOUS at 10:05

## 2018-02-23 RX ADMIN — CALCIUM CARBONATE-CHOLECALCIFEROL TAB 250 MG-125 UNIT SCH MG: 250-125 TAB at 13:00

## 2018-02-23 RX ADMIN — SODIUM CHLORIDE PRN MLS/HR: 900 INJECTION, SOLUTION INTRAVENOUS at 19:48

## 2018-02-23 NOTE — HHI.HP
[facility]


Service


Family Medicine


Primary Care Physician


Jhon Betts MD


Admission Diagnosis





Diagnoses:  


(1) Fracture, tibia and fibula, shaft


Diagnosis:  Principal





(2) Atrial fibrillation with RVR


Diagnosis:  Principal





(3) Dementia


Diagnosis:  Principal





(4) Agitation


Diagnosis:  Principal





(5) FEN/GI/PPx


Diagnosis:  Principal





International Travel<30 Days:  No


Contact w/Intl Traveler<30days:  No


Known Affected Area:  No


History of Present Illness


Ms Alvarenga is an 83YO female w/PMHx of dementia who presented from [facility] after a fall  with left distal tibia and fibula fractures on imaging and 

new onset afib RVR. Prashanth Sidhu, her son-in-law and HCPOA for Ms Alvarenga, 

reports that she fell off of a chair this morning at the [facility]. She is attended 

in the room by her two grandchildren, Cliff and Enriqueta Sidhu who cannot provide 

any other information other than that Ms Alvarenga has PMHx of dementia dx around 

, and that she is pretty healthy and does not take any medications. The pt 

is not oriented to person, place or time but states she is not in pain; however

, when the HOB is elevated starts yelling, "stop doing that, it hurts". Heart 

rate on the monitor in the ED was elevated to 170s-180s. Dr Velásquez was called 

for orthopedic consult.


She was seen this afternoon after her surgery and she is still sleeping/sedated 

but resting quietly. Her heart rate is in the 70s and she is very comfortable 

now.  History obtained from her family as above.





Review of Systems


ROS Limitations:  Clinical Condition





Past Family Social History


Past Medical History


dementia


FROOSH fractures of both hands/wrists several years ago


Past Surgical History


unknown


Allergies:  


Coded Allergies:  


     No Known Allergies (Unverified , 18)


Family History


unknown


pt unable to provide


Social History


Daughter and son-in-law are HCPOA


Lives in [facility]





Physical Exam


Vital Signs





Vital Signs








  Date Time  Temp Pulse Resp B/P (MAP) Pulse Ox O2 Delivery O2 Flow Rate FiO2


 


18 08:01 98.9 94 15 111/60 (77) 95   


 


18 08:01     96 Nasal Cannula 4 


 


18 08:01  94      


 


18 08:00  82      


 


18 08:00 98.1 87 19 129/60 (83) 97   


 


18 07:00     95 Nasal Cannula 2.00 


 


18 06:01  92      


 


18 06:00  82      


 


18 04:00  89      


 


18 04:00 98.8 89 17 98/56 (70) 97   


 


18 02:00  73      


 


18 02:00  73      


 


18 00:00 98.5 74 14 97/56 (70) 96   


 


18 00:00  74      


 


18 00:00  74      


 


18 22:00  112      


 


18 21:58        


 


18 21:58  88  126/76    


 


18 21:50 99.2 95 24 124/57 (79) 95   


 


18 21:50     95 Nasal Cannula 2.00 


 


18 21:01  87 16 124/58 (80) 98 Nasal Cannula 2.00 


 


18 20:58     97 Nasal Cannula 2.00 


 


18 20:39  88 18 124/73 (90) 97 Nasal Cannula 2.00 


 


18 19:11  84 18 126/64 (84) 97 Nasal Cannula 2.00 


 


18 18:45  89  126/64    


 


18 17:34  98 19 119/75 (90) 93 Nasal Cannula 2.00 


 


18 16:47  97 18 130/87 (101) 100 Room Air  


 


18 16:20  103 18 132/72 (92) 99   


 


18 14:56  138 18 132/71 (91) 98 Room Air  


 


18 14:49  145 19 115/92 (100) 98 Room Air  


 


18 14:24  151 18 131/78 (95) 99 Room Air  


 


18 13:52  152 18 124/89 (101) 98 Room Air  


 


18 13:17  153  122/79    


 


18 11:48  138 18  98 Room Air  


 


18 10:57  103 18  98 Room Air  


 


18 10:15     98 Room Air  








Physical Exam


GENERAL: This is a thin, elderly female resting calmly in bed. she is mouth 

breathing with the nasal canula next to her mouth at this time


SKIN: No rashes, ecchymoses or lesions. Cool and dry.


HEAD: Atraumatic. Normocephalic. 


EYES: Pupils equal round and reactive. No scleral icterus. No injection or 

drainage. 


ENT: Nose without drainage. Uvula midline. Airway patent.


NECK: Trachea midline. No JVD or lymphadenopathy. Supple, nontender, no 

meningeal signs.


CARDIOVASCULAR: Tachycardic with irregularly irregular rhythm without murmur, 

gallop, or rub. 


RESPIRATORY: Clear to auscultation. Breath sounds equal bilaterally. No wheezes

, rales, or rhonchi.  


GASTROINTESTINAL: Abdomen soft, non-tender, nondistended. No hepato-splenomegaly

, or palpable masses. No guarding.


MUSCULOSKELETAL: Extremities without clubbing, cyanosis, or edema. No edema of 

RLE. LLE in boot and ace bandage to the knee. No right calf tenderness. 


NEUROLOGICAL: Awake. Not oriented. Motor and sensory grossly within normal 

limits. cannot cooperate with extended exam Can speak.


Laboratory





Laboratory Tests








Test


  18


11:10 18


03:40


 


Prothrombin Time 11.0  


 


Prothromb Time International


Ratio 1.1 


  


 


 


Activated Partial


Thromboplast Time 22.5 


  


 


 


Magnesium Level 2.2  


 


Total Creatine Kinase 247  


 


Creatine Kinase MB 2.1  


 


Creatine Kinase MB % 0.9  


 


Troponin I 0.08  0.06 


 


White Blood Count  9.0 


 


Red Blood Count  3.42 


 


Hemoglobin  9.3 


 


Hematocrit  28.8 


 


Mean Corpuscular Volume  84.2 


 


Mean Corpuscular Hemoglobin  27.3 


 


Mean Corpuscular Hemoglobin


Concent 


  32.4 


 


 


Red Cell Distribution Width  17.1 


 


Platelet Count  302 


 


Mean Platelet Volume  10.2 


 


Neutrophils (%) (Auto)  68.5 


 


Lymphocytes (%) (Auto)  18.3 


 


Monocytes (%) (Auto)  10.9 


 


Eosinophils (%) (Auto)  1.3 


 


Basophils (%) (Auto)  1.0 


 


Neutrophils # (Auto)  6.2 


 


Lymphocytes # (Auto)  1.6 


 


Monocytes # (Auto)  1.0 


 


Eosinophils # (Auto)  0.1 


 


Basophils # (Auto)  0.1 


 


CBC Comment  DIFF FINAL 


 


Differential Comment   


 


Blood Urea Nitrogen  13 


 


Creatinine  0.59 


 


Random Glucose  92 


 


Total Protein  6.6 


 


Albumin  2.5 


 


Calcium Level  8.2 


 


Alkaline Phosphatase  133 


 


Aspartate Amino Transf


(AST/SGOT) 


  27 


 


 


Alanine Aminotransferase


(ALT/SGPT) 


  31 


 


 


Total Bilirubin  0.8 


 


Sodium Level  139 


 


Potassium Level  3.8 


 


Chloride Level  107 


 


Carbon Dioxide Level  26.7 


 


Anion Gap  5 


 


Estimat Glomerular Filtration


Rate 


  98 


 














 Date/Time


Source Procedure


Growth Status


 


 


 18 09:35


Urine Catheterized Urine Urine Culture


Pending Received








Result Diagram:  


18 0340                                                                   

             18 0340





Imaging





Last Impressions








Chest X-Ray 18 1006 Signed





Impressions: 





 Service Date/Time:   10:40 - CONCLUSION:  1. No 





 infiltrates seen. 2. Cardiomegaly and fullness of the central bronchopulmonary 





 markings suggest pulmonary venous hypertension.     Dylan Candelaria MD 


 


Tibia/Fibula X-Ray 18 0000 Signed





Impressions: 





 Service Date/Time:   10:27 - CONCLUSION:  

Comminuted 





 and angulated fractures of the distal one third shaft of the tibia and fibula.

   





   Dylan Candelaria MD 


 


Pelvis X-Ray 18 0000 Signed





Impressions: 





 Service Date/Time:   10:21 - CONCLUSION:  The bony 





 pelvic ring is grossly intact.  Symmetric appearance of bilateral total hip 





 arthroplasty.     Dylan Candelaria MD 


 


Femur X-Ray 18 0000 Signed





Impressions: 





 Service Date/Time:   10:23 - CONCLUSION:  1. No 





 fracture seen. 2. Possible knee effusion.     Thomas J. Yuschok, MD Caprini VTE Risk Assessment


Caprini VTE Risk Assessment:  Mod/High Risk (score >= 2)


Caprini Risk Assessment Model











 Point Value = 1          Point Value = 2  Point Value = 3  Point Value = 5


 


Age 41-60


Minor surgery


BMI > 25 kg/m2


Swollen legs


Varicose veins


Pregnancy or postpartum


History of unexplained or recurrent


   spontaneous 


Oral contraceptives or hormone


   replacement


Sepsis (< 1 month)


Serious lung disease, including


   pneumonia (< 1 month)


Abnormal pulmonary function


Acute myocardial infarction


Congestive heart failure (< 1 month)


History of inflammatory bowel disease


Medical patient at bed rest Age 61-74


Arthroscopic surgery


Major open surgery (> 45 min)


Laparoscopic surgery (> 45 min)


Malignancy


Confined to bed (> 72 hours)


Immobilizing plaster cast


Central venous access Age >= 75


History of VTE


Family history of VTE


Factor V Leiden


Prothrombin 50626F


Lupus anticoagulant


Anticardiolipin antibodies


Elevated serum homocysteine


Heparin-induced thrombocytopenia


Other congenital or acquired


   thrombophilia Stroke (< 1 month)


Elective arthroplasty


Hip, pelvis, or leg fracture


Acute spinal cord injury (< 1 month)








Prophylaxis Regimen











   Total Risk


Factor Score Risk Level Prophylaxis Regimen


 


0-1      Low Early ambulation


 


2 Moderate Order ONE of the following:


*Sequential Compression Device (SCD)


*Heparin 5000 units SQ BID


 


3-4 Higher Order ONE of the following medications:


*Heparin 5000 units SQ TID


*Enoxaparin/Lovenox 40 mg SQ daily (WT < 150 kg, CrCl > 30 mL/min)


*Enoxaparin/Lovenox 30 mg SQ daily (WT < 150 kg, CrCl > 10-29 mL/min)


*Enoxaparin/Lovenox 30 mg SQ BID (WT < 150 kg, CrCl > 30 mL/min)


AND/OR


*Sequential Compression Device (SCD)


 


5 or more Highest Order ONE of the following medications:


*Heparin 5000 units SQ TID (Preferred with Epidurals)


*Enoxaparin/Lovenox 40 mg SQ daily (WT < 150 kg, CrCl > 30 mL/min)


*Enoxaparin/Lovenox 30 mg SQ daily (WT < 150 kg, CrCl > 10-29 mL/min)


*Enoxaparin/Lovenox 30 mg SQ BID (WT < 150 kg, CrCl > 30 mL/min)


AND


*Sequential Compression Device (SCD)











Assessment and Plan


Assessment and Plan


83YO female w/PMHx dementia presents from [facility] with comminuted fractures of 

distal 1/3 of left tibia and fibula and new onset atrail fibrillation with RVR.


grandchildren are Enriqueta and Cliff Sidhu is [facility] [phone]





PLAN:


Admitted to inpatient


Orthopedics consult (Mr Velásquez) -- appreciate recs/surgical management


Pain control


SCD on RLE for mechanical AC until possible surgery


Cardiology consult -- appreciate recs


Diltiazem drip


Tele


Problem List:  


(1) Fracture, tibia and fibula, shaft


ICD Codes:  S82.209A - Unspecified fracture of shaft of unspecified tibia, 

initial encounter for closed fracture; S82.409A - Unspecified fracture of shaft 

of unspecified fibula, initial encounter for closed fracture


Status:  Acute


Plan:  Left Tibia and fibula x-rays with comminuted and angulated fractures of 

the distal one third shaft of the tibia and fibula.





-Orthopedics consult (Dr Velásquez) -- appreciate recs 


-NPO


-Morphine 2mg q3h IV pain 1-10


-Morphine 5mg q3h IV breakthru pain


-Zofran 4mg IV q8h PRN nausea


-Tylenol 650mg PO q6h fever


-Halina-colace 1 tab PO BID 


-PRN bowel regimen


-SCD on RLE for mechanical anticoagulation for now





(2) Atrial fibrillation with RVR


ICD Codes:  I48.91 - Unspecified atrial fibrillation


Status:  Acute


Plan:  New onset Afib with RVR with rate in 150s to 170s initially, unknown how 

long she has had this





-ED gave Dilt 10mg once


-Dilt gtt infusion protocol


-Cardiology consult--appreciate recs


-can consider changing to po tomorrow depending on echocardiogram and EF, 

diltiazem would be good for continued rate control





(3) Dementia


ICD Codes:  F03.90 - Unspecified dementia without behavioral disturbance


Status:  Chronic


Plan:  Stable. Takes no medications at [facility]





(4) Agitation


ICD Codes:  R45.1 - Restlessness and agitation


Status:  Acute


Plan:  Pt repeating phrase "help me, help me" while continuing to try and get up

, and grasping at bed covers





-Redirection as necessary


-Haldol 0.5mg IV once


suspect she was in pain when she was admitted as she is better today. can see 

how she does overnight





(5) FEN/GI/PPx


Status:  Acute


Plan:  Fluids: NS IVF @110ml/hr, if she takes good po can heplock


Electrolytes: will monitor and replete as necessary


Nutrition: NPO for now


GI: not indicated


PPx: SCD on RLE; will await surgery for further decision on chemo 

anticoagulation. often anticoagulation is started 24 hours after surgery





CM to assist post-op


PT tomorrow to eval and treat








Problem Qualifiers





(1) Fracture, tibia and fibula, shaft:  


Qualified Codes:  S82.202A - Unspecified fracture of shaft of left tibia, 

initial encounter for closed fracture; S82.402A - Unspecified fracture of shaft 

of left fibula, initial encounter for closed fracture


(2) Dementia:  


Qualified Codes:  F03.90 - Unspecified dementia without behavioral disturbance








Jessica Quarles MD 2018 10:08

## 2018-02-23 NOTE — PD.ORT.PN
Subjective


Subjective Remarks


POD 0 s/p IMN left tibia


stable in PACU





Objective


Vitals





Vital Signs








  Date Time  Temp Pulse Resp B/P (MAP) Pulse Ox O2 Delivery O2 Flow Rate FiO2


 


2/23/18 08:01 98.9 94 15 111/60 (77) 95   


 


2/23/18 08:01     96 Nasal Cannula 4 


 


2/23/18 08:01  94      


 


2/23/18 08:00  82      


 


2/23/18 08:00 98.1 87 19 129/60 (83) 97   


 


2/23/18 07:00     95 Nasal Cannula 2.00 


 


2/23/18 06:01  92      


 


2/23/18 06:00  82      


 


2/23/18 04:00  89      


 


2/23/18 04:00 98.8 89 17 98/56 (70) 97   


 


2/23/18 02:00  73      


 


2/23/18 02:00  73      


 


2/23/18 00:00 98.5 74 14 97/56 (70) 96   


 


2/23/18 00:00  74      


 


2/23/18 00:00  74      


 


2/22/18 22:00  112      


 


2/22/18 21:58        


 


2/22/18 21:58  88  126/76    


 


2/22/18 21:50 99.2 95 24 124/57 (79) 95   


 


2/22/18 21:50     95 Nasal Cannula 2.00 


 


2/22/18 21:01  87 16 124/58 (80) 98 Nasal Cannula 2.00 


 


2/22/18 20:58     97 Nasal Cannula 2.00 


 


2/22/18 20:39  88 18 124/73 (90) 97 Nasal Cannula 2.00 


 


2/22/18 19:11  84 18 126/64 (84) 97 Nasal Cannula 2.00 


 


2/22/18 18:45  89  126/64    


 


2/22/18 17:34  98 19 119/75 (90) 93 Nasal Cannula 2.00 


 


2/22/18 16:47  97 18 130/87 (101) 100 Room Air  


 


2/22/18 16:20  103 18 132/72 (92) 99   


 


2/22/18 14:56  138 18 132/71 (91) 98 Room Air  


 


2/22/18 14:49  145 19 115/92 (100) 98 Room Air  


 


2/22/18 14:24  151 18 131/78 (95) 99 Room Air  


 


2/22/18 13:52  152 18 124/89 (101) 98 Room Air  


 


2/22/18 13:17  153  122/79    


 


2/22/18 11:48  138 18  98 Room Air  


 


2/22/18 10:57  103 18  98 Room Air  














I/O      


 


 2/22/18 2/22/18 2/22/18 2/23/18 2/23/18 2/23/18





 07:00 15:00 23:00 07:00 15:00 23:00


 


      


 


# Voids    3  


 


# Bowel Movements    1  








Result Diagram:  


2/23/18 0340                                                                   

             2/23/18 0340





Other Results





Laboratory Tests








Test


  2/22/18


11:10


 


Prothromb Time International


Ratio 1.1 RATIO 


 


 


Prothrombin Time


  11.0 SEC


(9.8-11.6)








Imaging





Last 24 hours Impressions








Chest X-Ray 2/22/18 1006 Signed





Impressions: 





 Service Date/Time:  Thursday, February 22, 2018 10:40 - CONCLUSION:  1. No 





 infiltrates seen. 2. Cardiomegaly and fullness of the central bronchopulmonary 





 markings suggest pulmonary venous hypertension.     Dylan Candelaria MD 








Objective Remarks


LLE: dressings clean and dry. intact. nvi





Assessment & Plan


Assessment and Plan


1) Left Distal 1/3 Tibia and fibula shaft fxs s/p IMN by Dr Velásquez on 2/23/18 - 

POD 0


   -NWB LLE


   -daily dressing changes to begin POD 2 with xeroform/4x4/ACE. 


   -CM for rehab placement


   -DVt prophylaxis


   -Scripts on chart


   -f/u with [facility] or PA in 2 weeks











Cesario Latham/First Assist PA Feb 23, 2018 10:45

## 2018-02-23 NOTE — PD.ORT.PN
Subjective


Subjective Remarks


[facility]


demented. unable to answer qxs





Objective


Vitals





Vital Signs








  Date Time  Temp Pulse Resp B/P (MAP) Pulse Ox O2 Delivery O2 Flow Rate FiO2


 


2/23/18 06:01  92      


 


2/23/18 06:00  82      


 


2/23/18 04:00  89      


 


2/23/18 04:00 98.8 89 17 98/56 (70) 97   


 


2/23/18 02:00  73      


 


2/23/18 02:00  73      


 


2/23/18 00:00 98.5 74 14 97/56 (70) 96   


 


2/23/18 00:00  74      


 


2/23/18 00:00  74      


 


2/22/18 22:00  112      


 


2/22/18 21:58        


 


2/22/18 21:58  88  126/76    


 


2/22/18 21:50 99.2 95 24 124/57 (79) 95   


 


2/22/18 21:50     95 Nasal Cannula 2.00 


 


2/22/18 21:01  87 16 124/58 (80) 98 Nasal Cannula 2.00 


 


2/22/18 20:58     97 Nasal Cannula 2.00 


 


2/22/18 20:39  88 18 124/73 (90) 97 Nasal Cannula 2.00 


 


2/22/18 19:11  84 18 126/64 (84) 97 Nasal Cannula 2.00 


 


2/22/18 18:45  89  126/64    


 


2/22/18 17:34  98 19 119/75 (90) 93 Nasal Cannula 2.00 


 


2/22/18 16:47  97 18 130/87 (101) 100 Room Air  


 


2/22/18 16:20  103 18 132/72 (92) 99   


 


2/22/18 14:56  138 18 132/71 (91) 98 Room Air  


 


2/22/18 14:49  145 19 115/92 (100) 98 Room Air  


 


2/22/18 14:24  151 18 131/78 (95) 99 Room Air  


 


2/22/18 13:52  152 18 124/89 (101) 98 Room Air  


 


2/22/18 13:17  153  122/79    


 


2/22/18 11:48  138 18  98 Room Air  


 


2/22/18 10:57  103 18  98 Room Air  


 


2/22/18 10:15     98 Room Air  


 


2/22/18 09:12 98.2 89 18 169/104 (125) 98   














I/O      


 


 2/22/18 2/22/18 2/22/18 2/23/18 2/23/18 2/23/18





 07:00 15:00 23:00 07:00 15:00 23:00


 


      


 


# Voids    3  


 


# Bowel Movements    1  








Result Diagram:  


2/23/18 0340                                                                   

             2/23/18 0340





Other Results





Laboratory Tests








Test


  2/22/18


11:10


 


Prothromb Time International


Ratio 1.1 RATIO 


 


 


Prothrombin Time


  11.0 SEC


(9.8-11.6)








Imaging





Last 24 hours Impressions








Chest X-Ray 2/22/18 1006 Signed





Impressions: 





 Service Date/Time:  Thursday, February 22, 2018 10:40 - CONCLUSION:  1. No 





 infiltrates seen. 2. Cardiomegaly and fullness of the central bronchopulmonary 





 markings suggest pulmonary venous hypertension.     Dylan Candelaria MD 








Objective Remarks


LLE: +long leg splint. intact.





Assessment & Plan


Assessment and Plan


1) Left Distal 1/3 Tibia and fibula shaft fxs


   -NPO


   -consents


   -surgery today with Dr Jeffery or Cesario Rebollar/First Assist PA Feb 23, 2018 07:03

## 2018-02-23 NOTE — PD.CARD.PN
Subjective


Subjective Remarks


post op, sedated





Objective


Medications





Current Medications








 Medications


  (Trade)  Dose


 Ordered  Sig/César


 Route  Start Time


 Stop Time Status Last Admin


 


 Diltiazem HCl 125


 mg/Sodium Chloride  125 ml @ 5


 mls/hr  TITRATE  PRN


 IV  2/22/18 12:45


    2/22/18 21:58


 


 


 Sodium Chloride  1,000 ml @ 


 110 mls/hr  Q9H6M


 IV  2/22/18 13:01


    2/22/18 23:18


 


 


  (NS Flush)  2 ml  UNSCH  PRN


 IV FLUSH  2/22/18 13:15


     


 


 


  (NS Flush)  2 ml  BID


 IV FLUSH  2/22/18 21:00


     


 


 


  (Tylenol)  650 mg  Q4H  PRN


 PO  2/22/18 13:15


     


 


 


  (Zofran Inj)  4 mg  Q6H  PRN


 IVP  2/22/18 13:15


     


 


 


  (Narcan Inj)  0.4 mg  UNSCH  PRN


 IV PUSH  2/22/18 13:15


     


 


 


  (Halina-Colace)  1 tab  BID


 PO  2/22/18 21:00


     


 


 


  (Milk Of


 Magnesia Liq)  30 ml  Q12H  PRN


 PO  2/22/18 13:15


     


 


 


  (Senokot)  17.2 mg  Q12H  PRN


 PO  2/22/18 13:15


     


 


 


  (Dulcolax Supp)  10 mg  DAILY  PRN


 RECTAL  2/22/18 13:15


     


 


 


  (Lactulose Liq)  30 ml  DAILY  PRN


 PO  2/22/18 13:15


     


 


 


  (Morphine Inj)  2 mg  Q3H  PRN


 IV PUSH  2/22/18 13:15


    2/22/18 23:19


 


 


 Lactated Ringer's  1,000 ml @ 


 100 mls/hr  Q10H


 IV  2/23/18 10:05


     


 


 


  (Lovenox Inj)  30 mg  Q24H


 SQ  2/23/18 10:15


   UNV  


 


 


 Cefazolin Sodium/


 Dextrose  50 ml @ 


 100 mls/hr  Q8H


 IV  2/23/18 10:15


 2/24/18 02:44 UNV  


 


 


 Vancomycin HCl


 1000 mg/Sodium


 Chloride  250 ml @ 


 250 mls/hr  Q12H


 IV  2/23/18 10:15


 2/23/18 11:14 UNV  


 


 


  (Norco  7.5-325


 Mg)  1 tab  Q3H  PRN


 PO  2/23/18 10:15


     


 


 


  (Oscal-D 250-125)  250 mg  TID


 PO  2/23/18 13:00


     


 


 


  (Benadryl)  25 mg  Q6H  PRN


 PO  2/23/18 10:15


     


 


 


  (Morphine Inj)  2 mg  Q3H  PRN


 IV PUSH  2/23/18 10:45


     


 


 


  (Drisdol)  50,000 units  Q7D


 PO  2/23/18 11:00


     


 


 


  (Vitamin D3)  1,000 units  DAILY


 PO  2/24/18 09:00


     


 


 


  (Vitamin C)  1,000 mg  DAILY


 PO  2/24/18 09:00


     


 


 


 Miscellaneous


 Information  ALL


 NURSING


 DEPARTME...  UNSCH  PRN


 .XX  2/23/18 11:45


 2/24/18 11:44   


 








Vital Signs / I&O





Vital Signs








  Date Time  Temp Pulse Resp B/P (MAP) Pulse Ox O2 Delivery O2 Flow Rate FiO2


 


2/23/18 08:01 98.9 94 15 111/60 (77) 95   


 


2/23/18 08:01     96 Nasal Cannula 4 


 


2/23/18 08:01  94      


 


2/23/18 08:00  82      


 


2/23/18 08:00 98.1 87 19 129/60 (83) 97   


 


2/23/18 07:00     95 Nasal Cannula 2.00 


 


2/23/18 06:01  92      


 


2/23/18 06:00  82      


 


2/23/18 04:00  89      


 


2/23/18 04:00 98.8 89 17 98/56 (70) 97   


 


2/23/18 02:00  73      


 


2/23/18 02:00  73      


 


2/23/18 00:00 98.5 74 14 97/56 (70) 96   


 


2/23/18 00:00  74      


 


2/23/18 00:00  74      


 


2/22/18 22:00  112      


 


2/22/18 21:58        


 


2/22/18 21:58  88  126/76    


 


2/22/18 21:50 99.2 95 24 124/57 (79) 95   


 


2/22/18 21:50     95 Nasal Cannula 2.00 


 


2/22/18 21:01  87 16 124/58 (80) 98 Nasal Cannula 2.00 


 


2/22/18 20:58     97 Nasal Cannula 2.00 


 


2/22/18 20:39  88 18 124/73 (90) 97 Nasal Cannula 2.00 


 


2/22/18 19:11  84 18 126/64 (84) 97 Nasal Cannula 2.00 


 


2/22/18 18:45  89  126/64    


 


2/22/18 17:34  98 19 119/75 (90) 93 Nasal Cannula 2.00 


 


2/22/18 16:47  97 18 130/87 (101) 100 Room Air  


 


2/22/18 16:20  103 18 132/72 (92) 99   


 


2/22/18 14:56  138 18 132/71 (91) 98 Room Air  


 


2/22/18 14:49  145 19 115/92 (100) 98 Room Air  


 


2/22/18 14:24  151 18 131/78 (95) 99 Room Air  


 


2/22/18 13:52  152 18 124/89 (101) 98 Room Air  


 


2/22/18 13:17  153  122/79    














I/O      


 


 2/22/18 2/22/18 2/22/18 2/23/18 2/23/18 2/23/18





 07:00 15:00 23:00 07:00 15:00 23:00


 


      


 


# Voids    3  


 


# Bowel Movements    1  








Physical Exam


GENERAL: 


SKIN: Warm and dry.


HEAD: Normocephalic.


EYES: No scleral icterus. No injection or drainage. 


NECK: Supple, trachea midline. No JVD or lymphadenopathy.


CARDIOVASCULAR: Regular rate and rhythm without murmurs, gallops, or rubs. 


RESPIRATORY: Breath sounds equal bilaterally. No accessory muscle use.


GASTROINTESTINAL: Abdomen soft, non-tender, nondistended. 


MUSCULOSKELETAL: No cyanosis, or edema. 


BACK: Nontender without obvious deformity. No CVA tenderness.





Laboratory





Laboratory Tests








Test


  2/23/18


03:40 2/23/18


10:40


 


White Blood Count 9.0 TH/MM3  


 


Red Blood Count 3.42 MIL/MM3  


 


Hemoglobin 9.3 GM/DL  8.6 GM/DL 


 


Hematocrit 28.8 %  27.0 % 


 


Mean Corpuscular Volume 84.2 FL  


 


Mean Corpuscular Hemoglobin 27.3 PG  


 


Mean Corpuscular Hemoglobin


Concent 32.4 % 


  


 


 


Red Cell Distribution Width 17.1 %  


 


Platelet Count 302 TH/MM3  


 


Mean Platelet Volume 10.2 FL  


 


Neutrophils (%) (Auto) 68.5 %  


 


Lymphocytes (%) (Auto) 18.3 %  


 


Monocytes (%) (Auto) 10.9 %  


 


Eosinophils (%) (Auto) 1.3 %  


 


Basophils (%) (Auto) 1.0 %  


 


Neutrophils # (Auto) 6.2 TH/MM3  


 


Lymphocytes # (Auto) 1.6 TH/MM3  


 


Monocytes # (Auto) 1.0 TH/MM3  


 


Eosinophils # (Auto) 0.1 TH/MM3  


 


Basophils # (Auto) 0.1 TH/MM3  


 


CBC Comment DIFF FINAL  


 


Differential Comment   


 


Blood Urea Nitrogen 13 MG/DL  


 


Creatinine 0.59 MG/DL  


 


Random Glucose 92 MG/DL  


 


Total Protein 6.6 GM/DL  


 


Albumin 2.5 GM/DL  


 


Calcium Level 8.2 MG/DL  


 


Alkaline Phosphatase 133 U/L  


 


Aspartate Amino Transf


(AST/SGOT) 27 U/L 


  


 


 


Alanine Aminotransferase


(ALT/SGPT) 31 U/L 


  


 


 


Total Bilirubin 0.8 MG/DL  


 


Sodium Level 139 MEQ/L  


 


Potassium Level 3.8 MEQ/L  


 


Chloride Level 107 MEQ/L  


 


Carbon Dioxide Level 26.7 MEQ/L  


 


Anion Gap 5 MEQ/L  


 


Estimat Glomerular Filtration


Rate 98 ML/MIN 


  


 


 


Troponin I 0.06 NG/ML  











Assessment and Plan


Problem List:  


(1) New onset a-fib


ICD Codes:  I48.91 - Unspecified atrial fibrillation


Status:  Acute


(2) Atrial fibrillation with RVR


ICD Codes:  I48.91 - Unspecified atrial fibrillation


Status:  Acute


(3) Dementia


ICD Codes:  F03.90 - Unspecified dementia without behavioral disturbance


Status:  Chronic


(4) Fracture, tibia and fibula, shaft


ICD Codes:  S82.209A - Unspecified fracture of shaft of unspecified tibia, 

initial encounter for closed fracture; S82.409A - Unspecified fracture of shaft 

of unspecified fibula, initial encounter for closed fracture


Status:  Acute


Assessment and Plan


1.) Afib- rate controlled, pod #0, ortho surgery, ac indeterminate due to 

dementia, will consult case ,management to assist in determining a surrogate





Problem Qualifiers





(1) Fracture, tibia and fibula, shaft:  


Qualified Codes:  S82.202A - Unspecified fracture of shaft of left tibia, 

initial encounter for closed fracture; S82.402A - Unspecified fracture of shaft 

of left fibula, initial encounter for closed fracture








Jayden Lama MD Feb 23, 2018 12:13

## 2018-02-23 NOTE — RADRPT
EXAM DATE/TIME:  02/23/2018 09:57 

 

[facility] COMPARISON:     

TIBIA/FIBULA LEFT (AP/LAT), February 22, 2018, 10:27.

 

                     

INDICATIONS :     

IM braydon left tib fib.

                     

 

MEDICAL HISTORY :     

Carcinoma, bladder.       Fractures tibia and fibula.   

 

SURGICAL HISTORY :        

Bilateral hip replacements

 

ENCOUNTER:     

Subsequent                                        

 

ACUITY:     

2 days      

 

PAIN SCORE:     

Non-responsive.

 

LOCATION:     

Left  Tib Fib.

 

FINDINGS:     

Multiple intraoperative fluoroscopic images demonstrate interval medullary braydon and screw fixation of 
the tibia. Hardware is well-positioned. There is near-anatomic alignment with slight medial and poste
rior displacement of the distal fragment. Distal fibular fracture now appears nearly anatomic in ali
nment. No additional new acute fractures.

 

CONCLUSION:     

1. Status post left tibial IM braydon fixation, as above.

 

 

 

 Bennie Barger MD on February 23, 2018 at 19:01           

Board Certified Radiologist.

 This report was verified electronically.

## 2018-02-23 NOTE — PD.OP
cc:   Michael Abraham MD


__________________________________________________





Operative Report


Date of Surgery:  Feb 23, 2018


Preoperative Diagnosis:  


Displaced left tibia shaft fracture


Postoperative Diagnosis:  


Procedure:


Reduction and intramedullary nail fixation left tibia


Anesthesia:


Gen.


Surgeon:


Michael Abraham


Assistant(s):


MARCELLE Padilla PA-C


 


The surgical procedure was assisted by my physician assistant. My P.A. presence 

was necessary throughout this case for the manipulation and positioning of the 

surgical extremity. My P.A. was assisting me throughout the duration of this 

procedure. The skill set of a physician assistant was medically necessary to 

complete this procedure. During the surgical case the surgical tech was working 

at the back table and the physician assistant was directly assisting me.


Operation and Findings:


Implants: ITS [10]mm x [345]mm tibial nail





Plan of activity: Nonweightbearing





Patient was seen and examined preoperatively.  An informed consent was obtained 

from patient after detailed discussion of risk and benefits.  Risks of surgery 

include bleeding, infection, painful hardware, nonunion, malunion, leg length 

discrepancy, need for hardware removal, and medical complications associated 

with anesthesia including blood clots, stroke, heart attack, and death were 

discussed.  Operative site was marked.  Patient was brought to the operating 

room placed on or table.  Patient received IV antibiotics and was given IV 

sedation GETA.  Operative leg was prepped with alcohol Hibiclens and draped in 

usual sterile fashion.  Timeout procedure was performed





Procedure began with reduction of fracture.  2 small incisions were made around 

the fracture site.  A percutaneous clamp was placed.  Traction was applied.  

Fracture was reduced.  There was comminution of the fracture.  The fracture 

reduced and excellent alignment was achieved.  Fracture clamp was used to aid 

in reduction.  Next a 3 cm incision was made proximal to the patella.  

Quadriceps tendon was split in line with fibers.  Cannulas were placed in the 

patellofemoral joint to protect the articular surface at all times.  A guidepin 

was placed into the tibia and advanced in the tibial canal.  Fluoroscopy was 

used to confirm appropriate guidepin placement.  An opening reamer was used to 

open the tibial canal.  A ball-tipped guidewire was advanced down the tibial 

canal.  Guidepin was passed across the fracture site into the center of the 

distal tibia.  Fluoroscopy confirmed guidepin placement.  The nail length was 

now measured.  The fracture was now held in a reduced position and the canal 

was reamed.  The canal was reamed up to appropriate size.  A ITS nail was now 

selected.  Next the nail was fully seated.  Using perfect [place] technique 3 

distal interlocking screws were placed.  Using the insertion handle as a guide 

2 proximal interlocking screws were placed.  Fluoroscopy confirmed excellent of 

fracture with well-placed hardware.  Incisions and the knee joint were 

thoroughly irrigated with sterile saline.  Fascia was closed with #1 Vicryl, 

subcutaneous tissues closed with 3-0 Vicryl and skin was closed with staples.  

Sterile dressings were applied.  Patient was awakened and transferred to 

recovery in stable condition.











Michael Abraham MD Feb 23, 2018 10:10

## 2018-02-23 NOTE — MB
cc:

BRITNEY PONCE

****

 

 

DATE OF CONSULTATION

2/23/2018

 

REASON FOR CONSULTATION

Left tibia fracture.

 

CONSULTING PHYSICIAN

Dr. Quarles

 

HISTORY

Chayito is an 82-year-old female who lives in an [facility].  She had a fall off of a

chair this morning.  She presented to the emergency room where x-rays revealed

a displaced left distal tibia-fibula fracture.  She is currently on the

orthopedic floor.  She is awake, but has significant dementia.  Her only

complaint is her left leg.  She is unable to give any other history secondary

to dementia.

 

PAST MEDICAL HISTORY

 

ILLNESSES

1.  Dementia

2.  Osteoporosis

 

MEDICATIONS

Please see EMR for a complete list of inpatient medications.  This was

reviewed.

 

ALLERGIES

None

 

FAMILY HISTORY

Unobtainable secondary to dementia.

 

SOCIAL HISTORY

The patient lives in [facility].  Her son-in-law is

health care power of .

 

REVIEW OF SYSTEMS

Unobtainable secondary to dementia.

 

 

 

 

PHYSICAL EXAMINATION

The patient is a thin 82-year female.  She is awake but confused. She is in no

acute distress.

VITAL SIGNS:  Temperature 98.9, pulse 94, respirations 15, blood pressure

111/60, O2 sat 95% on four liters nasal cannula.

HEAD:  The patient is normocephalic.

EYES, EARS, NOSE AND THROAT:  Pupils are equal.

NECK:  Soft and nontender.  Trachea is midline.

ABDOMEN:  Soft, nontender, nondistended.

EXTREMITIES:  Examination of bilateral upper extremities reveals no pain with

shoulder or wrist motion.  She has good cap refill in all fingers.  Skin is

intact to both hands.  Sensation is intact in both hands.  Examination of right

leg reveals no obvious pain or deformity with hip, knee or ankle motion.  Skin

is intact.  Dorsalis pedis pulses palpable.  Sensation is intact. Examination

of the left leg reveals no tenderness around her hip or knee.  She is diffusely

tender around the calf.  Calf and thigh compartments are soft.  She has good

cap refill in her foot.  Dorsalis pedis pulses are palpable.

 

X-RAYS

X-rays of the left tibia were reviewed.  X-rays reveal a displaced left distal

tibial shaft fracture.

 

LABORATORY DATA

White blood cell count is 9.0, hemoglobin is 9.3 and hematocrit is 28.8.  INR

is 1.1.

 

BUN is 13, creatinine is 0.59.

 

IMPRESSION

1. Dementia

2. Osteoporosis

3. Left distal tibia-fibula shaft fractures.

 

PLAN

Treatment options were discussed with the patient, as well as her son-in-law

who is power of .  I discussed treatment options including closed

treatment with casting versus surgical treatment with open reduction and

intramedullary nail fixation.  Risks and benefits of both procedures were

discussed in-depth with the patient and her son-in-law.  At this point, my

recommendation would be surgery for reduction, intramedullary nail fixation of

left tibia.  I am concerned that she would develop wound or skin problems in a

cast and will likely develop malalignment of the fracture.  The risks of

surgery include bleeding, infection, injury to arteries, nerves and blood

vessels, nonunion, malunion, painful hardware, as well as medical complications

including blood clot, stroke, heart attack and death.  All questions were

answered.  I will plan on surgery today.

 

A mid-level provider in my office, nurse practitioner or PA, may see this

patient on a follow-up basis and continue to implement the objective of this

plan including: Starting or adjusting medications, injections of muscle,

tendon, bursa or joints, cast application, orthotic or brace application,

physical therapy, further radiographic studies including x-ray, MRI, CT,

ultrasounds or bone scan, vascular studies, neurologic studies, or other

specialist consultations, and proceeding with surgical management as

appropriate.

 

 

 

 

                              _________________________________

                              MD NATALIE Padron/MERNA

D:  2/23/2018/9:08 AM

T:  2/23/2018/12:59 PM

Visit #:  [id number]

Job #:  [id number]

## 2018-02-24 VITALS
OXYGEN SATURATION: 99 % | TEMPERATURE: 97.8 F | HEART RATE: 85 BPM | RESPIRATION RATE: 18 BRPM | DIASTOLIC BLOOD PRESSURE: 57 MMHG | SYSTOLIC BLOOD PRESSURE: 105 MMHG

## 2018-02-24 VITALS
OXYGEN SATURATION: 94 % | DIASTOLIC BLOOD PRESSURE: 52 MMHG | SYSTOLIC BLOOD PRESSURE: 93 MMHG | RESPIRATION RATE: 17 BRPM | HEART RATE: 95 BPM | TEMPERATURE: 98 F

## 2018-02-24 VITALS
HEART RATE: 93 BPM | RESPIRATION RATE: 21 BRPM | DIASTOLIC BLOOD PRESSURE: 84 MMHG | SYSTOLIC BLOOD PRESSURE: 123 MMHG | OXYGEN SATURATION: 98 % | TEMPERATURE: 98.1 F

## 2018-02-24 VITALS — HEART RATE: 96 BPM

## 2018-02-24 VITALS
TEMPERATURE: 98.7 F | HEART RATE: 116 BPM | SYSTOLIC BLOOD PRESSURE: 91 MMHG | DIASTOLIC BLOOD PRESSURE: 50 MMHG | RESPIRATION RATE: 25 BRPM | OXYGEN SATURATION: 98 %

## 2018-02-24 VITALS
SYSTOLIC BLOOD PRESSURE: 120 MMHG | TEMPERATURE: 99 F | RESPIRATION RATE: 20 BRPM | OXYGEN SATURATION: 92 % | DIASTOLIC BLOOD PRESSURE: 76 MMHG | HEART RATE: 116 BPM

## 2018-02-24 VITALS — OXYGEN SATURATION: 97 %

## 2018-02-24 VITALS — HEART RATE: 86 BPM

## 2018-02-24 VITALS
RESPIRATION RATE: 18 BRPM | TEMPERATURE: 99.9 F | SYSTOLIC BLOOD PRESSURE: 114 MMHG | DIASTOLIC BLOOD PRESSURE: 58 MMHG | HEART RATE: 90 BPM | OXYGEN SATURATION: 98 %

## 2018-02-24 VITALS — HEART RATE: 99 BPM

## 2018-02-24 VITALS — HEART RATE: 81 BPM

## 2018-02-24 VITALS — HEART RATE: 90 BPM

## 2018-02-24 VITALS — HEART RATE: 116 BPM

## 2018-02-24 LAB
BACTERIA #/AREA URNS HPF: (no result) /HPF
BASOPHILS # BLD AUTO: 0.1 TH/MM3 (ref 0–0.2)
BASOPHILS NFR BLD: 1.1 % (ref 0–2)
BUN SERPL-MCNC: 11 MG/DL (ref 7–18)
CALCIUM SERPL-MCNC: 8.2 MG/DL (ref 8.5–10.1)
CHLORIDE SERPL-SCNC: 105 MEQ/L (ref 98–107)
COLOR UR: YELLOW
CREAT SERPL-MCNC: 0.67 MG/DL (ref 0.5–1)
EOSINOPHIL # BLD: 0.1 TH/MM3 (ref 0–0.4)
EOSINOPHIL NFR BLD: 0.9 % (ref 0–4)
ERYTHROCYTE [DISTWIDTH] IN BLOOD BY AUTOMATED COUNT: 16.9 % (ref 11.6–17.2)
GFR SERPLBLD BASED ON 1.73 SQ M-ARVRAT: 84 ML/MIN (ref 89–?)
GLUCOSE SERPL-MCNC: 88 MG/DL (ref 74–106)
GLUCOSE UR STRIP-MCNC: (no result) MG/DL
HCO3 BLD-SCNC: 26 MEQ/L (ref 21–32)
HCT VFR BLD CALC: 24.4 % (ref 35–46)
HGB BLD-MCNC: 8.2 GM/DL (ref 11.6–15.3)
HGB UR QL STRIP: (no result)
KETONES UR STRIP-MCNC: 40 MG/DL
LYMPHOCYTES # BLD AUTO: 1.3 TH/MM3 (ref 1–4.8)
LYMPHOCYTES NFR BLD AUTO: 12.9 % (ref 9–44)
MCH RBC QN AUTO: 28 PG (ref 27–34)
MCHC RBC AUTO-ENTMCNC: 33.5 % (ref 32–36)
MCV RBC AUTO: 83.5 FL (ref 80–100)
MONOCYTE #: 1 TH/MM3 (ref 0–0.9)
MONOCYTES NFR BLD: 9.2 % (ref 0–8)
MUCOUS THREADS #/AREA URNS LPF: (no result) /LPF
NEUTROPHILS # BLD AUTO: 7.9 TH/MM3 (ref 1.8–7.7)
NEUTROPHILS NFR BLD AUTO: 75.9 % (ref 16–70)
NITRITE UR QL STRIP: (no result)
PLATELET # BLD: 283 TH/MM3 (ref 150–450)
PMV BLD AUTO: 9.9 FL (ref 7–11)
RBC # BLD AUTO: 2.92 MIL/MM3 (ref 4–5.3)
SODIUM SERPL-SCNC: 138 MEQ/L (ref 136–145)
SP GR UR STRIP: 1.02 (ref 1–1.03)
SQUAMOUS #/AREA URNS HPF: <1 /HPF (ref 0–5)
TRANS CELLS #/AREA URNS HPF: <1 /HPF
URINE LEUKOCYTE ESTERASE: (no result)
WBC # BLD AUTO: 10.4 TH/MM3 (ref 4–11)

## 2018-02-24 RX ADMIN — Medication SCH ML: at 09:00

## 2018-02-24 RX ADMIN — OXYCODONE HYDROCHLORIDE AND ACETAMINOPHEN SCH MG: 500 TABLET ORAL at 09:00

## 2018-02-24 RX ADMIN — HALOPERIDOL PRN MG: 5 INJECTION INTRAMUSCULAR at 12:26

## 2018-02-24 RX ADMIN — HALOPERIDOL PRN MG: 5 INJECTION INTRAMUSCULAR at 20:12

## 2018-02-24 RX ADMIN — VITAMIN D, TAB 1000IU (100/BT) SCH UNITS: 25 TAB at 09:00

## 2018-02-24 RX ADMIN — CEFAZOLIN SODIUM SCH MLS/HR: 2 SOLUTION INTRAVENOUS at 09:40

## 2018-02-24 RX ADMIN — MORPHINE SULFATE PRN MG: 2 INJECTION, SOLUTION INTRAMUSCULAR; INTRAVENOUS at 20:12

## 2018-02-24 RX ADMIN — SODIUM CHLORIDE SCH MLS/HR: 900 INJECTION INTRAVENOUS at 20:20

## 2018-02-24 RX ADMIN — OXYTOCIN SCH MLS/HR: 10 INJECTION, SOLUTION INTRAMUSCULAR; INTRAVENOUS at 09:40

## 2018-02-24 RX ADMIN — ENOXAPARIN SODIUM SCH MG: 30 INJECTION SUBCUTANEOUS at 09:40

## 2018-02-24 RX ADMIN — STANDARDIZED SENNA CONCENTRATE AND DOCUSATE SODIUM SCH TAB: 8.6; 5 TABLET, FILM COATED ORAL at 09:00

## 2018-02-24 RX ADMIN — MORPHINE SULFATE PRN MG: 2 INJECTION, SOLUTION INTRAMUSCULAR; INTRAVENOUS at 02:45

## 2018-02-24 RX ADMIN — CALCIUM CARBONATE-CHOLECALCIFEROL TAB 250 MG-125 UNIT SCH MG: 250-125 TAB at 17:32

## 2018-02-24 RX ADMIN — SODIUM CHLORIDE PRN MLS/HR: 900 INJECTION, SOLUTION INTRAVENOUS at 14:21

## 2018-02-24 RX ADMIN — MORPHINE SULFATE PRN MG: 2 INJECTION, SOLUTION INTRAMUSCULAR; INTRAVENOUS at 10:28

## 2018-02-24 RX ADMIN — MORPHINE SULFATE PRN MG: 2 INJECTION, SOLUTION INTRAMUSCULAR; INTRAVENOUS at 15:00

## 2018-02-24 RX ADMIN — CALCIUM CARBONATE-CHOLECALCIFEROL TAB 250 MG-125 UNIT SCH MG: 250-125 TAB at 13:00

## 2018-02-24 RX ADMIN — STANDARDIZED SENNA CONCENTRATE AND DOCUSATE SODIUM SCH TAB: 8.6; 5 TABLET, FILM COATED ORAL at 21:00

## 2018-02-24 RX ADMIN — Medication SCH ML: at 21:00

## 2018-02-24 RX ADMIN — CALCIUM CARBONATE-CHOLECALCIFEROL TAB 250 MG-125 UNIT SCH MG: 250-125 TAB at 09:00

## 2018-02-24 NOTE — HHI.FPPN
Subjective


Remarks


Nursing reports that the patient is not eating or drinking anything.  She is 

not following commands.  Nursing reports that the patient keeps removing her 

oxygen mask which causes her to desat.


Her breakfast remains untouched.


Patient unable to provide any history.


 (Will Tuttle MD, R3)





Objective


Vitals





Vital Signs








  Date Time  Temp Pulse Resp B/P (MAP) Pulse Ox O2 Delivery O2 Flow Rate FiO2


 


2/24/18 07:59     97 Nasal Cannula 4.00 


 


2/24/18 06:00  81      


 


2/24/18 04:00 98.0 95 17 93/52 (66) 94   


 


2/24/18 04:00  95      


 


2/24/18 02:00  99      


 


2/24/18 00:00  116      


 


2/24/18 00:00 98.7 99 25 91/50 (64) 98   


 


2/23/18 22:00  116      


 


2/23/18 20:11     95 Nasal Cannula 2.00 


 


2/23/18 20:00  116      


 


2/23/18 20:00 98.6 87 19 113/56 (75) 99   


 


2/23/18 19:48  112  116/63    


 


2/23/18 19:00     99 Nasal Cannula 3.00 


 


2/23/18 19:00 98.6 87 19 113/56 (75) 99   


 


2/23/18 17:19     99 Nasal Cannula 2.00 


 


2/23/18 16:00 97.6 83 18 122/56 (78) 97   


 


2/23/18 12:00 98.3 90 16 105/56 (72) 100   


 


2/23/18 11:14  72 18 98/58 (71) 98 Nasal Cannula 3 


 


2/23/18 11:00  73 18 96/55 (69) 98 Nasal Cannula 3 














I/O      


 


 2/23/18 2/23/18 2/23/18 2/24/18 2/24/18 2/24/18





 07:00 15:00 23:00 07:00 15:00 23:00


 


Intake Total  700 ml  300 ml  


 


Output Total  300 ml 400 ml 800 ml  


 


Balance  400 ml -400 ml -500 ml  


 


      


 


Intake IV Total    300 ml  


 


Other  700 ml    


 


Output Urine Total  200 ml 400 ml 800 ml  


 


Estimated Blood Loss  100 ml    


 


# Voids 3     


 


# Bowel Movements 1     








 (Will Tuttle MD, R3)


Result Diagram:  


2/24/18 0437 2/24/18 0437





Objective Remarks


GENERAL: This is a thin, elderly female lying in bed.  No acute distress.  Does 

not respond to questions.


SKIN: No rashes, ecchymoses or lesions. 


HEAD: Atraumatic. Normocephalic. 


EYES:  No scleral icterus. No injection or drainage. 


ENT: Nose without drainage.


NECK: Trachea midline. No JVD or lymphadenopathy.  no meningeal signs.


CARDIOVASCULAR: Tachycardic with irregularly irregular rhythm without murmur, 

gallop, or rub. 


RESPIRATORY: Clear to auscultation. Breath sounds equal bilaterally. No wheezes

, rales, or rhonchi.  


GASTROINTESTINAL: Abdomen soft, non-tender, nondistended. No hepato-splenomegaly

, or palpable masses. No guarding.


MUSCULOSKELETAL: Extremities without clubbing, cyanosis, or edema. No edema of 

RLE. LLE in boot and ace bandage to the knee. No right calf tenderness. 


NEUROLOGICAL: Awake. Not oriented. Motor and sensory grossly within normal 

limits. cannot cooperate


 (Will Tuttle MD, R3)





A/P


Assessment and Plan


81YO female w/PMHx dementia presents from [facility] with comminuted fractures of 

distal 1/3 of left tibia and fibula and new onset atrail fibrillation with RVR.


Discharge Planning


Pending clinical improvement


grandchildren are Enriqueta and Cliff Sidhu is [facility] [phone]


 (Will Tuttle MD, R3)


Attending Attestation


Patient seen and examined.  Case reviewed and discussed with the resident team.

  Agree with plan of care as discussed with me and documented in the resident 

note.


agree she is delirious. she cannot hear or follow any commands at this time. 

she is back to picking at her blankets and speaking but not in sentences more 

like repeating the same words over and over. She did do better when she had 

better pain control. 2 mg of morphine didn't seem to do anything. she tolerated 

4 mg of morphine yesterday so will increase her dose. She cannot tell anyone 

when she is in pain but she does look uncomfortable. will also try very low 

dose haldol prn as she is very agitated now.


hopefully, she will respond to her family as she was more focused when they 

were here. she would be fine hemodynamically being transferred to [facility] or [facility] 

bed where she could stay on her drip until she cooperates with po but she is so 

confused she may need a sitter at this time


 (Jessica Quarles MD)


Problem List:  


(1) Delirium


ICD Codes:  R41.0 - Disorientation, unspecified


Status:  Acute


Plan:  Patient appears to be acutely delirious this morning.





Nonpharmacologic treatment: Maintain orientation, minimize sleep deprivation, 

maintain mobility (out of bed to chair with PT)


Consider pharmacologic treatment if needed: Haloperidol when necessary





Currently nursing is asking for restraints because the patient is removing her 

mask and is desatting.  We will provide soft restraints at this time.





Consider underlying etiology if warranted: UA, CXRAY.





(2) Fracture, tibia and fibula, shaft


ICD Codes:  S82.209A - Unspecified fracture of shaft of unspecified tibia, 

initial encounter for closed fracture; S82.409A - Unspecified fracture of shaft 

of unspecified fibula, initial encounter for closed fracture


Status:  Acute


Plan:  -Orthopedics consult (Dr Velásquez) 


POD #1


will continue to follow recs





(3) Atrial fibrillation with RVR


ICD Codes:  I48.91 - Unspecified atrial fibrillation


Status:  Acute


Plan:  -Cardiology consult--appreciate recs


Patient not currently tolerating PO and is delirious. Continue diltiazem drip.





(4) Dementia


ICD Codes:  F03.90 - Unspecified dementia without behavioral disturbance


Status:  Chronic


Plan:  Stable. Takes no medications at [facility]





(5) FEN/GI/PPx


Status:  Acute


Plan:  Fluids: NS IVF @110ml/hr, if she takes good po can heplock


Electrolytes: will monitor and replete as necessary


Nutrition: regular diet


GI: not indicated


PPx: SCD on RLE; lovenox 30 per ortho





CM to assist post-op


PT tomorrow to eval and treat


 (Will Tuttle MD, R3)





Problem Qualifiers





(1) Fracture, tibia and fibula, shaft:  


Qualified Codes:  S82.202A - Unspecified fracture of shaft of left tibia, 

initial encounter for closed fracture; S82.402A - Unspecified fracture of shaft 

of left fibula, initial encounter for closed fracture


(2) Dementia:  


Qualified Codes:  F03.90 - Unspecified dementia without behavioral disturbance








Will Tuttle MD, R3 Feb 24, 2018 10:56


Jessica Quarles MD Feb 24, 2018 14:15

## 2018-02-24 NOTE — PD.ORT.PN
Subjective


Post Op Day #:  1


Subjective Remarks


Pt is asleep but does awake to stimuli. She is pleasantly confused. No other 

complaints.





Objective


Vitals





Vital Signs








  Date Time  Temp Pulse Resp B/P (MAP) Pulse Ox O2 Delivery O2 Flow Rate FiO2


 


2/24/18 06:00  81      


 


2/24/18 04:00 98.0 95 17 93/52 (66) 94   


 


2/24/18 04:00  95      


 


2/24/18 02:00  99      


 


2/24/18 00:00  116      


 


2/24/18 00:00 98.7 99 25 91/50 (64) 98   


 


2/23/18 22:00  116      


 


2/23/18 20:11     95 Nasal Cannula 2.00 


 


2/23/18 20:00  116      


 


2/23/18 20:00 98.6 87 19 113/56 (75) 99   


 


2/23/18 19:48  112  116/63    


 


2/23/18 19:00     99 Nasal Cannula 3.00 


 


2/23/18 19:00 98.6 87 19 113/56 (75) 99   


 


2/23/18 17:19     99 Nasal Cannula 2.00 


 


2/23/18 16:00 97.6 83 18 122/56 (78) 97   


 


2/23/18 12:00 98.3 90 16 105/56 (72) 100   


 


2/23/18 11:14  72 18 98/58 (71) 98 Nasal Cannula 3 


 


2/23/18 11:00  73 18 96/55 (69) 98 Nasal Cannula 3 


 


2/23/18 10:45  80 17 128/76 (93) 94 Nasal Cannula 3 


 


2/23/18 10:35 98.2 79 17 128/67 (87) 95 Nasal Cannula 4 


 


2/23/18 08:01 98.9 94 15 111/60 (77) 95   


 


2/23/18 08:01     96 Nasal Cannula 4 


 


2/23/18 08:01  94      


 


2/23/18 08:00  82      


 


2/23/18 08:00 98.1 87 19 129/60 (83) 97   














I/O      


 


 2/23/18 2/23/18 2/23/18 2/24/18 2/24/18 2/24/18





 07:00 15:00 23:00 07:00 15:00 23:00


 


Intake Total  700 ml  300 ml  


 


Output Total  300 ml 400 ml 800 ml  


 


Balance  400 ml -400 ml -500 ml  


 


      


 


Intake IV Total    300 ml  


 


Other  700 ml    


 


Output Urine Total  200 ml 400 ml 800 ml  


 


Estimated Blood Loss  100 ml    


 


# Voids 3     


 


# Bowel Movements 1     








Result Diagram:  


2/24/18 0437 2/24/18 0437





Imaging





Last 24 hours Impressions








Chest X-Ray 2/22/18 1006 Signed





Impressions: 





 Service Date/Time:  Thursday, February 22, 2018 10:40 - CONCLUSION:  1. No 





 infiltrates seen. 2. Cardiomegaly and fullness of the central bronchopulmonary 





 markings suggest pulmonary venous hypertension.     Dylan Candelaria MD 








Procedures


Left Distal 1/3 Tibia and fibula shaft fxs s/p IMN by Dr Velásquez on 2/23/18


Objective Remarks


LLE: dressings clean and dry. intact. nvi, slight pink colored hue to dorsal 

aspect of left foot, + blanches, 1+ pedal edema, no change.





Assessment & Plan


Ortho Post Op Day #:  1


Problem List:  


(1) Fracture, tibia and fibula, shaft


ICD Codes:  S82.209A - Unspecified fracture of shaft of unspecified tibia, 

initial encounter for closed fracture; S82.409A - Unspecified fracture of shaft 

of unspecified fibula, initial encounter for closed fracture


Status:  Acute


Qualifiers:  


   Qualified Codes:  S82.202A - Unspecified fracture of shaft of left tibia, 

initial encounter for closed fracture; S82.402A - Unspecified fracture of shaft 

of left fibula, initial encounter for closed fracture


Assessment and Plan


1) Left Distal 1/3 Tibia and fibula shaft fxs s/p IMN by Dr Velásquez on 2/23/18 - 

POD 1


   -NWB LLE


   -daily dressing changes to begin POD 2 with xeroform/4x4/ACE. 


   -CM for rehab placement


   -DVT prophylaxis


   -Scripts on chart


   - Discharge planning - possibly tomorrow or Monday


   -f/u with [facility] or [facility] in 2 weeks











Remedios Olivier Feb 24, 2018 7:22 am

## 2018-02-24 NOTE — RADRPT
EXAM DATE/TIME:  02/24/2018 11:29 

 

[facility] COMPARISON:     

CHEST SINGLE AP, February 22, 2018, 10:40.

 

                     

INDICATIONS :     

Shortness of breath.

                     

 

MEDICAL HISTORY :     

Carcinoma, bladder.          

 

SURGICAL HISTORY :        

Hip replacement, bilateral.

 

ENCOUNTER:     

Initial                                        

 

ACUITY:     

1 day      

 

PAIN SCORE:     

Non-responsive.

 

LOCATION:     

Bilateral chest 

 

FINDINGS:     

Compared to the prior exam there continues to be increased interstitial markings bilaterally characte
ristic of pulmonary edema. This appears to be increased compared to the prior examination. There is s
mall bilateral effusions. The heart size is stable. The bony structures are stable.

 

CONCLUSION:     

Interstitial pulmonary edema. This appears to be increased compared to the prior exam.

 

 

 

 Petr Salgado MD on February 24, 2018 at 11:42           

Board Certified Radiologist.

 This report was verified electronically.

## 2018-02-24 NOTE — PD.CARD.PN
Subjective


Subjective Remarks


alert, o x 0-1, in nad





Objective


Medications





Current Medications








 Medications


  (Trade)  Dose


 Ordered  Sig/César


 Route  Start Time


 Stop Time Status Last Admin


 


 Diltiazem HCl 125


 mg/Sodium Chloride  125 ml @ 5


 mls/hr  TITRATE  PRN


 IV  2/22/18 12:45


    2/23/18 19:48


 


 


  (NS Flush)  2 ml  UNSCH  PRN


 IV FLUSH  2/22/18 13:15


     


 


 


  (NS Flush)  2 ml  BID


 IV FLUSH  2/22/18 21:00


    2/23/18 20:25


 


 


  (Tylenol)  650 mg  Q4H  PRN


 PO  2/22/18 13:15


     


 


 


  (Zofran Inj)  4 mg  Q6H  PRN


 IVP  2/22/18 13:15


     


 


 


  (Narcan Inj)  0.4 mg  UNSCH  PRN


 IV PUSH  2/22/18 13:15


     


 


 


  (Halina-Colace)  1 tab  BID


 PO  2/22/18 21:00


     


 


 


  (Milk Of


 Magnesia Liq)  30 ml  Q12H  PRN


 PO  2/22/18 13:15


     


 


 


  (Senokot)  17.2 mg  Q12H  PRN


 PO  2/22/18 13:15


     


 


 


  (Dulcolax Supp)  10 mg  DAILY  PRN


 RECTAL  2/22/18 13:15


     


 


 


  (Lactulose Liq)  30 ml  DAILY  PRN


 PO  2/22/18 13:15


     


 


 


  (Morphine Inj)  2 mg  Q3H  PRN


 IV PUSH  2/22/18 13:15


    2/24/18 10:28


 


 


 Lactated Ringer's  1,000 ml @ 


 100 mls/hr  Q10H


 IV  2/23/18 10:05


    2/24/18 09:40


 


 


  (Lovenox Inj)  30 mg  Q24H


 SQ  2/24/18 09:30


    2/24/18 09:40


 


 


  (Norco  7.5-325


 Mg)  1 tab  Q3H  PRN


 PO  2/23/18 10:15


     


 


 


  (Oscal-D 250-125)  250 mg  TID


 PO  2/23/18 13:00


     


 


 


  (Benadryl)  25 mg  Q6H  PRN


 PO  2/23/18 10:15


     


 


 


  (Morphine Inj)  2 mg  Q3H  PRN


 IV PUSH  2/23/18 10:45


     


 


 


  (Drisdol)  50,000 units  Q7D


 PO  2/23/18 11:00


     


 


 


  (Vitamin D3)  1,000 units  DAILY


 PO  2/24/18 09:00


     


 


 


  (Vitamin C)  1,000 mg  DAILY


 PO  2/24/18 09:00


     


 


 


 Miscellaneous


 Information  ALL


 NURSING


 DEPARTME...  UNSCH  PRN


 .XX  2/23/18 11:45


 2/24/18 11:44   


 








Vital Signs / I&O





Vital Signs








  Date Time  Temp Pulse Resp B/P (MAP) Pulse Ox O2 Delivery O2 Flow Rate FiO2


 


2/24/18 07:59     97 Nasal Cannula 4.00 


 


2/24/18 06:00  81      


 


2/24/18 04:00 98.0 95 17 93/52 (66) 94   


 


2/24/18 04:00  95      


 


2/24/18 02:00  99      


 


2/24/18 00:00  116      


 


2/24/18 00:00 98.7 99 25 91/50 (64) 98   


 


2/23/18 22:00  116      


 


2/23/18 20:11     95 Nasal Cannula 2.00 


 


2/23/18 20:00  116      


 


2/23/18 20:00 98.6 87 19 113/56 (75) 99   


 


2/23/18 19:48  112  116/63    


 


2/23/18 19:00     99 Nasal Cannula 3.00 


 


2/23/18 19:00 98.6 87 19 113/56 (75) 99   


 


2/23/18 17:19     99 Nasal Cannula 2.00 


 


2/23/18 16:00 97.6 83 18 122/56 (78) 97   


 


2/23/18 12:00 98.3 90 16 105/56 (72) 100   


 


2/23/18 11:14  72 18 98/58 (71) 98 Nasal Cannula 3 


 


2/23/18 11:00  73 18 96/55 (69) 98 Nasal Cannula 3 


 


2/23/18 10:45  80 17 128/76 (93) 94 Nasal Cannula 3 














I/O      


 


 2/23/18 2/23/18 2/23/18 2/24/18 2/24/18 2/24/18





 07:00 15:00 23:00 07:00 15:00 23:00


 


Intake Total  700 ml  300 ml  


 


Output Total  300 ml 400 ml 800 ml  


 


Balance  400 ml -400 ml -500 ml  


 


      


 


Intake IV Total    300 ml  


 


Other  700 ml    


 


Output Urine Total  200 ml 400 ml 800 ml  


 


Estimated Blood Loss  100 ml    


 


# Voids 3     


 


# Bowel Movements 1     








Physical Exam


GENERAL: 


SKIN: Warm and dry.


HEAD: Normocephalic.


EYES: No scleral icterus. No injection or drainage. 


NECK: Supple, trachea midline. No JVD or lymphadenopathy.


CARDIOVASCULAR: Regular rate and rhythm without murmurs, gallops, or rubs. 


RESPIRATORY: Breath sounds equal bilaterally. No accessory muscle use.


GASTROINTESTINAL: Abdomen soft, non-tender, nondistended. 


MUSCULOSKELETAL: No cyanosis, or edema. 


BACK: Nontender without obvious deformity. No CVA tenderness.





Laboratory





Laboratory Tests








Test


  2/23/18


10:40 2/24/18


04:37


 


Hemoglobin 8.6 GM/DL  8.2 GM/DL 


 


Hematocrit 27.0 %  24.4 % 


 


White Blood Count  10.4 TH/MM3 


 


Red Blood Count  2.92 MIL/MM3 


 


Mean Corpuscular Volume  83.5 FL 


 


Mean Corpuscular Hemoglobin  28.0 PG 


 


Mean Corpuscular Hemoglobin


Concent 


  33.5 % 


 


 


Red Cell Distribution Width  16.9 % 


 


Platelet Count  283 TH/MM3 


 


Mean Platelet Volume  9.9 FL 


 


Neutrophils (%) (Auto)  75.9 % 


 


Lymphocytes (%) (Auto)  12.9 % 


 


Monocytes (%) (Auto)  9.2 % 


 


Eosinophils (%) (Auto)  0.9 % 


 


Basophils (%) (Auto)  1.1 % 


 


Neutrophils # (Auto)  7.9 TH/MM3 


 


Lymphocytes # (Auto)  1.3 TH/MM3 


 


Monocytes # (Auto)  1.0 TH/MM3 


 


Eosinophils # (Auto)  0.1 TH/MM3 


 


Basophils # (Auto)  0.1 TH/MM3 


 


CBC Comment  DIFF FINAL 


 


Differential Comment   


 


Blood Urea Nitrogen  11 MG/DL 


 


Creatinine  0.67 MG/DL 


 


Random Glucose  88 MG/DL 


 


Calcium Level  8.2 MG/DL 


 


Sodium Level  138 MEQ/L 


 


Potassium Level  3.8 MEQ/L 


 


Chloride Level  105 MEQ/L 


 


Carbon Dioxide Level  26.0 MEQ/L 


 


Anion Gap  7 MEQ/L 


 


Estimat Glomerular Filtration


Rate 


  84 ML/MIN 


 











Assessment and Plan


Problem List:  


(1) New onset a-fib


ICD Codes:  I48.91 - Unspecified atrial fibrillation


Status:  Acute


(2) Atrial fibrillation with RVR


ICD Codes:  I48.91 - Unspecified atrial fibrillation


Status:  Acute


(3) Dementia


ICD Codes:  F03.90 - Unspecified dementia without behavioral disturbance


Status:  Chronic


(4) Fracture, tibia and fibula, shaft


ICD Codes:  S82.209A - Unspecified fracture of shaft of unspecified tibia, 

initial encounter for closed fracture; S82.409A - Unspecified fracture of shaft 

of unspecified fibula, initial encounter for closed fracture


Status:  Acute


Assessment and Plan


1.) Afib- rate controlled, pod # 1, ortho surgery, ac indeterminate due to 

dementia, will consult case ,management to assist in determining a surrogate





Problem Qualifiers





(1) Dementia:  


Qualified Codes:  F03.90 - Unspecified dementia without behavioral disturbance


(2) Fracture, tibia and fibula, shaft:  


Qualified Codes:  S82.202A - Unspecified fracture of shaft of left tibia, 

initial encounter for closed fracture; S82.402A - Unspecified fracture of shaft 

of left fibula, initial encounter for closed fracture








Jayden Lama MD Feb 24, 2018 10:40

## 2018-02-25 VITALS
SYSTOLIC BLOOD PRESSURE: 151 MMHG | HEART RATE: 130 BPM | DIASTOLIC BLOOD PRESSURE: 78 MMHG | OXYGEN SATURATION: 93 % | RESPIRATION RATE: 23 BRPM | TEMPERATURE: 98.4 F

## 2018-02-25 VITALS — OXYGEN SATURATION: 96 %

## 2018-02-25 VITALS
OXYGEN SATURATION: 98 % | RESPIRATION RATE: 19 BRPM | DIASTOLIC BLOOD PRESSURE: 70 MMHG | SYSTOLIC BLOOD PRESSURE: 119 MMHG | HEART RATE: 97 BPM | TEMPERATURE: 98.1 F

## 2018-02-25 VITALS
HEART RATE: 106 BPM | TEMPERATURE: 98 F | RESPIRATION RATE: 21 BRPM | DIASTOLIC BLOOD PRESSURE: 68 MMHG | OXYGEN SATURATION: 92 % | SYSTOLIC BLOOD PRESSURE: 142 MMHG

## 2018-02-25 VITALS
HEART RATE: 97 BPM | DIASTOLIC BLOOD PRESSURE: 74 MMHG | RESPIRATION RATE: 16 BRPM | TEMPERATURE: 98 F | OXYGEN SATURATION: 99 % | SYSTOLIC BLOOD PRESSURE: 142 MMHG

## 2018-02-25 VITALS
RESPIRATION RATE: 15 BRPM | DIASTOLIC BLOOD PRESSURE: 74 MMHG | OXYGEN SATURATION: 100 % | TEMPERATURE: 98.1 F | SYSTOLIC BLOOD PRESSURE: 131 MMHG | HEART RATE: 87 BPM

## 2018-02-25 VITALS — OXYGEN SATURATION: 94 %

## 2018-02-25 VITALS — HEART RATE: 110 BPM

## 2018-02-25 VITALS
OXYGEN SATURATION: 99 % | TEMPERATURE: 98.2 F | DIASTOLIC BLOOD PRESSURE: 74 MMHG | SYSTOLIC BLOOD PRESSURE: 138 MMHG | RESPIRATION RATE: 19 BRPM | HEART RATE: 96 BPM

## 2018-02-25 VITALS — HEART RATE: 80 BPM

## 2018-02-25 VITALS — OXYGEN SATURATION: 100 %

## 2018-02-25 VITALS — HEART RATE: 95 BPM

## 2018-02-25 VITALS — HEART RATE: 102 BPM

## 2018-02-25 VITALS — HEART RATE: 90 BPM

## 2018-02-25 LAB
BASOPHILS # BLD AUTO: 0.1 TH/MM3 (ref 0–0.2)
BASOPHILS NFR BLD: 1.1 % (ref 0–2)
BUN SERPL-MCNC: 9 MG/DL (ref 7–18)
CALCIUM SERPL-MCNC: 8.2 MG/DL (ref 8.5–10.1)
CHLORIDE SERPL-SCNC: 104 MEQ/L (ref 98–107)
CREAT SERPL-MCNC: 0.37 MG/DL (ref 0.5–1)
EOSINOPHIL # BLD: 0.1 TH/MM3 (ref 0–0.4)
EOSINOPHIL NFR BLD: 1.3 % (ref 0–4)
ERYTHROCYTE [DISTWIDTH] IN BLOOD BY AUTOMATED COUNT: 16.8 % (ref 11.6–17.2)
GFR SERPLBLD BASED ON 1.73 SQ M-ARVRAT: 167 ML/MIN (ref 89–?)
GLUCOSE SERPL-MCNC: 79 MG/DL (ref 74–106)
HCO3 BLD-SCNC: 25.7 MEQ/L (ref 21–32)
HCT VFR BLD CALC: 26 % (ref 35–46)
HGB BLD-MCNC: 8.7 GM/DL (ref 11.6–15.3)
LYMPHOCYTES # BLD AUTO: 1.1 TH/MM3 (ref 1–4.8)
LYMPHOCYTES NFR BLD AUTO: 12.3 % (ref 9–44)
MCH RBC QN AUTO: 27.7 PG (ref 27–34)
MCHC RBC AUTO-ENTMCNC: 33.3 % (ref 32–36)
MCV RBC AUTO: 83.2 FL (ref 80–100)
MONOCYTE #: 0.8 TH/MM3 (ref 0–0.9)
MONOCYTES NFR BLD: 8.7 % (ref 0–8)
NEUTROPHILS # BLD AUTO: 7 TH/MM3 (ref 1.8–7.7)
NEUTROPHILS NFR BLD AUTO: 76.6 % (ref 16–70)
PLATELET # BLD: 271 TH/MM3 (ref 150–450)
PMV BLD AUTO: 9.5 FL (ref 7–11)
RBC # BLD AUTO: 3.13 MIL/MM3 (ref 4–5.3)
SODIUM SERPL-SCNC: 138 MEQ/L (ref 136–145)
WBC # BLD AUTO: 9.1 TH/MM3 (ref 4–11)

## 2018-02-25 RX ADMIN — MORPHINE SULFATE PRN MG: 2 INJECTION, SOLUTION INTRAMUSCULAR; INTRAVENOUS at 20:44

## 2018-02-25 RX ADMIN — Medication SCH ML: at 20:23

## 2018-02-25 RX ADMIN — MORPHINE SULFATE PRN MG: 2 INJECTION, SOLUTION INTRAMUSCULAR; INTRAVENOUS at 00:12

## 2018-02-25 RX ADMIN — CALCIUM CARBONATE-CHOLECALCIFEROL TAB 250 MG-125 UNIT SCH MG: 250-125 TAB at 17:35

## 2018-02-25 RX ADMIN — Medication SCH ML: at 09:09

## 2018-02-25 RX ADMIN — SODIUM CHLORIDE PRN MLS/HR: 900 INJECTION, SOLUTION INTRAVENOUS at 22:32

## 2018-02-25 RX ADMIN — CALCIUM CARBONATE-CHOLECALCIFEROL TAB 250 MG-125 UNIT SCH MG: 250-125 TAB at 09:00

## 2018-02-25 RX ADMIN — STANDARDIZED SENNA CONCENTRATE AND DOCUSATE SODIUM SCH TAB: 8.6; 5 TABLET, FILM COATED ORAL at 20:22

## 2018-02-25 RX ADMIN — STANDARDIZED SENNA CONCENTRATE AND DOCUSATE SODIUM SCH TAB: 8.6; 5 TABLET, FILM COATED ORAL at 09:00

## 2018-02-25 RX ADMIN — VITAMIN D, TAB 1000IU (100/BT) SCH UNITS: 25 TAB at 09:00

## 2018-02-25 RX ADMIN — SODIUM CHLORIDE SCH MLS/HR: 900 INJECTION INTRAVENOUS at 20:22

## 2018-02-25 RX ADMIN — MAGNESIUM SULFATE HEPTAHYDRATE SCH MLS/HR: 500 INJECTION, SOLUTION INTRAMUSCULAR; INTRAVENOUS at 13:04

## 2018-02-25 RX ADMIN — MORPHINE SULFATE PRN MG: 2 INJECTION, SOLUTION INTRAMUSCULAR; INTRAVENOUS at 17:34

## 2018-02-25 RX ADMIN — SODIUM CHLORIDE PRN MLS/HR: 900 INJECTION, SOLUTION INTRAVENOUS at 09:46

## 2018-02-25 RX ADMIN — OXYCODONE HYDROCHLORIDE AND ACETAMINOPHEN SCH MG: 500 TABLET ORAL at 09:00

## 2018-02-25 RX ADMIN — ENOXAPARIN SODIUM SCH MG: 30 INJECTION SUBCUTANEOUS at 09:08

## 2018-02-25 RX ADMIN — MORPHINE SULFATE PRN MG: 2 INJECTION, SOLUTION INTRAMUSCULAR; INTRAVENOUS at 09:08

## 2018-02-25 RX ADMIN — CALCIUM CARBONATE-CHOLECALCIFEROL TAB 250 MG-125 UNIT SCH MG: 250-125 TAB at 13:00

## 2018-02-25 NOTE — HHI.FPPN
Subjective


Remarks


She still does not answer any questions or even try to eat or drink at all. She 

does have a living will but only referring to a terminal diagnosis.


Spoke to her son in law and daughter over the phone. I asked them to consider 

what Ms Alvarenga would have wanted in this situation. I explained that she has not 

taken any po at all and just chews on her straws when her water is held to her 

mouth. Her family was not ready to make any decisions and I told them we can 

give D5 today to give her some water and calories. I discussed that tubes to 

feed could be used including a short term NG tube vs a long term PEG. I also 

pointed out that often at her age and stage of dementia a serious fracture is 

life ending.


We brought up code status as well but they were not ready to change from their 

prior full code.


They wanted to bring their own food in to give to her. I was fine with whatever 

could be done to help her eat but cautioned them that if she had any coughing 

or choking, they needed to not try at this point.


The only questions they had were about how to obtain medical records. I told 

them that Medical records would be open tomorrow and be able to advise them on 

the procedure and that all Xrays are on disc now.


It was also explained that palliative was consulted. Ms Alvarenga's family seems 

optimistic that she will regain her  normal eating but as time goes on she has 

unfortunately not shown improvement on hospital day 3.





Objective


Vitals





Vital Signs








  Date Time  Temp Pulse Resp B/P (MAP) Pulse Ox O2 Delivery O2 Flow Rate FiO2


 


2/25/18 10:00  90      


 


2/25/18 09:46  100  138/59    


 


2/25/18 08:00  136      


 


2/25/18 08:00 98.4 130 23 151/78 (102) 93   


 


2/25/18 07:58  143  145/70    


 


2/25/18 07:40     94 Nasal Cannula 4.00 


 


2/25/18 07:00     94 Nasal Cannula 3.00 


 


2/25/18 06:00  110      


 


2/25/18 04:00  97      


 


2/25/18 04:00 98.1 97 19 119/70 (86) 98   


 


2/25/18 03:24     100 Nasal Cannula 4.00 


 


2/25/18 02:00  95      


 


2/25/18 00:00 98.0 97 16 142/74 (96) 99   


 


2/25/18 00:00  97      


 


2/24/18 22:00  90      


 


2/24/18 20:00 98.1 93 21 123/84 (97) 98   


 


2/24/18 20:00  93      


 


2/24/18 19:00     98 Nasal Cannula 3.00 


 


2/24/18 18:00  86      


 


2/24/18 16:00  85      


 


2/24/18 16:00 97.8 85 18 105/57 (73) 99   


 


2/24/18 14:21  110  165/83    


 


2/24/18 14:00  116      














I/O      


 


 2/24/18 2/24/18 2/24/18 2/25/18 2/25/18 2/25/18





 07:00 15:00 23:00 07:00 15:00 23:00


 


Intake Total 300 ml 1100 ml 100 ml   


 


Output Total 800 ml  600 ml 600 ml  


 


Balance -500 ml 1100 ml -500 ml -600 ml  


 


      


 


Intake Oral   0 ml   


 


IV Total 300 ml 1100 ml 100 ml   


 


Output Urine Total 800 ml  600 ml 600 ml  


 


# Bowel Movements   0 0  








Result Diagram:  


2/25/18 0330                                                                   

             2/25/18 0330





Objective Remarks


GENERAL: This is a thin, elderly female lying in bed.  No acute distress.  Does 

not respond to questions. repeats words at times and is silent at times. no 

meaningful interaction. just chewed on her straw


SKIN: No rashes, ecchymoses or lesions. 


HEAD: Atraumatic. Normocephalic. 


EYES:  No scleral icterus. No injection or drainage. 


ENT: Nose without drainage.


NECK: Trachea midline. No JVD or lymphadenopathy.  no meningeal signs.


CARDIOVASCULAR: Tachycardic with irregularly irregular rhythm without murmur, 

gallop, or rub. 


RESPIRATORY: Clear to auscultation. Breath sounds equal bilaterally. No wheezes

, rales, or rhonchi.  


GASTROINTESTINAL: Abdomen soft, non-tender, nondistended. No hepato-splenomegaly

, or palpable masses. No guarding.


MUSCULOSKELETAL: Extremities without clubbing, cyanosis, or edema. No edema of 

RLE. LLE in boot and ace bandage to the knee. No right calf tenderness. 


NEUROLOGICAL: Awake. Not oriented. Motor and sensory grossly within normal 

limits. cannot cooperate





A/P


Assessment and Plan


83YO female w/PMHx dementia presents from [facility] with comminuted fractures of 

distal 1/3 of left tibia and fibula and new onset atrail fibrillation with RVR.


Discharge Planning


Pending clinical improvement


grandchildren are Enriqueta [phone]


Problem List:  


(1) Delirium


ICD Codes:  R41.0 - Disorientation, unspecified


Status:  Acute


Plan:  Patient appears to be still delirious vs severe baseline dementia this 

morning. she is more calm with the higher dose of morphine and some haldol. 

Yesterday she was miserably agitated and grabbing the sheets





Nonpharmacologic treatment: Maintain orientation if possible, minimize sleep 

deprivation, maintain mobility (out of bed to chair with PT) if and when able


Consider pharmacologic treatment if needed: Haloperidol when necessary





restraints because the patient is removing her mask and is desatting.  We will 

provide soft restraints at this time.





Consider underlying etiology if warranted: UA, CXRAY.





(2) Fracture, tibia and fibula, shaft


ICD Codes:  S82.209A - Unspecified fracture of shaft of unspecified tibia, 

initial encounter for closed fracture; S82.409A - Unspecified fracture of shaft 

of unspecified fibula, initial encounter for closed fracture


Status:  Acute


Plan:  -Orthopedics consult (Dr Velásquez) 


will continue to follow recs





(3) Atrial fibrillation with RVR


ICD Codes:  I48.91 - Unspecified atrial fibrillation


Status:  Acute


Plan:  -Cardiology consult--appreciate recs


Patient not currently tolerating PO and is delirious/ severely demented. 

Continue diltiazem drip.





(4) Dementia


ICD Codes:  F03.90 - Unspecified dementia without behavioral disturbance


Status:  Chronic


Plan:  Stable. Takes no medications at [facility]





(5) FEN/GI/PPx


Status:  Acute


Plan:  Fluids: NS IVF @80ml/hr


Electrolytes: will monitor and replete as necessary


Nutrition: taking nothing by mouth


discussed with family re potential tube placement


GI: not indicated


PPx: SCD on RLE; lovenox 30 per ortho





CM to assist with eventual SNF


PT tomorrow to eval and treat, doesn't appear she can cooperate








Problem Qualifiers





(1) Fracture, tibia and fibula, shaft:  


Qualified Codes:  S82.202A - Unspecified fracture of shaft of left tibia, 

initial encounter for closed fracture; S82.402A - Unspecified fracture of shaft 

of left fibula, initial encounter for closed fracture


(2) Dementia:  


Qualified Codes:  F03.90 - Unspecified dementia without behavioral disturbance








Jessica Quarles MD Feb 25, 2018 13:08

## 2018-02-25 NOTE — PD.ORT.PN
Subjective


Post Op Day #:  2


Subjective Remarks


The patient is awake, arousable but does not follow commands.  There have been 

no interim reported problems with regards to the lower extremities.





Objective


Vitals





Vital Signs








  Date Time  Temp Pulse Resp B/P (MAP) Pulse Ox O2 Delivery O2 Flow Rate FiO2


 


2/25/18 06:00  110      


 


2/25/18 04:00  97      


 


2/25/18 04:00 98.1 97 19 119/70 (86) 98   


 


2/25/18 03:24     100 Nasal Cannula 4.00 


 


2/25/18 02:00  95      


 


2/25/18 00:00 98.0 97 16 142/74 (96) 99   


 


2/25/18 00:00  97      


 


2/24/18 22:00  90      


 


2/24/18 20:00 98.1 93 21 123/84 (97) 98   


 


2/24/18 20:00  93      


 


2/24/18 19:00     98 Nasal Cannula 3.00 


 


2/24/18 18:00  86      


 


2/24/18 16:00  85      


 


2/24/18 16:00 97.8 85 18 105/57 (73) 99   


 


2/24/18 14:21  110  165/83    


 


2/24/18 14:00  116      


 


2/24/18 12:00  116      


 


2/24/18 12:00 99.0 116 20 120/76 (91) 92   


 


2/24/18 11:53  112  131/70    


 


2/24/18 10:00  96      


 


2/24/18 08:00 99.9 90 18 114/58 (76) 98   


 


2/24/18 08:00  78      


 


2/24/18 07:59     97 Nasal Cannula 4.00 














I/O      


 


 2/24/18 2/24/18 2/24/18 2/25/18 2/25/18 2/25/18





 07:00 15:00 23:00 07:00 15:00 23:00


 


Intake Total 300 ml 1100 ml 100 ml   


 


Output Total 800 ml  600 ml 600 ml  


 


Balance -500 ml 1100 ml -500 ml -600 ml  


 


      


 


Intake Oral   0 ml   


 


IV Total 300 ml 1100 ml 100 ml   


 


Output Urine Total 800 ml  600 ml 600 ml  


 


# Bowel Movements   0 0  








Result Diagram:  


2/25/18 0330                                                                   

             2/25/18 0330





Imaging





Last 24 hours Impressions








Chest X-Ray 2/22/18 1006 Signed





Impressions: 





 Service Date/Time:  Thursday, February 22, 2018 10:40 - CONCLUSION:  1. No 





 infiltrates seen. 2. Cardiomegaly and fullness of the central bronchopulmonary 





 markings suggest pulmonary venous hypertension.     Dylan Candelaria MD 








Procedures


Left Distal 1/3 Tibia and fibula shaft fxs s/p IMN by Dr Velásquez on 2/23/18


Objective Remarks


LLE: dressings clean and dry. intact. nvi, stable capillary refill, 1+ pedal 

edema, no change.





Assessment & Plan


Problem List:  


(1) Fracture, tibia and fibula, shaft


ICD Codes:  S82.209A - Unspecified fracture of shaft of unspecified tibia, 

initial encounter for closed fracture; S82.409A - Unspecified fracture of shaft 

of unspecified fibula, initial encounter for closed fracture


Status:  Acute


Qualifiers:  


   Qualified Codes:  S82.202A - Unspecified fracture of shaft of left tibia, 

initial encounter for closed fracture; S82.402A - Unspecified fracture of shaft 

of left fibula, initial encounter for closed fracture


Assessment and Plan


1) Left Distal 1/3 Tibia and fibula shaft fxs s/p IMN by Dr Velásquez on 2/23/18 - 

POD 2


   -NWB LLE


   -daily dressing changes to begin POD 2 with xeroform/4x4/ACE. 


   -CM for rehab placement


   -DVT prophylaxis


   -Scripts on chart


   - Discharge planning 


   -f/u with [facility] or [facility] in 2 weeks











Mariusz Espana MD Feb 25, 2018 07:10

## 2018-02-25 NOTE — PD.CARD.PN
Subjective


Subjective Remarks


alert, o x 0-1, in nad, restrained





Objective


Medications





Current Medications








 Medications


  (Trade)  Dose


 Ordered  Sig/César


 Route  Start Time


 Stop Time Status Last Admin


 


 Diltiazem HCl 125


 mg/Sodium Chloride  125 ml @ 5


 mls/hr  TITRATE  PRN


 IV  2/22/18 12:45


    2/24/18 14:21


 


 


  (NS Flush)  2 ml  UNSCH  PRN


 IV FLUSH  2/22/18 13:15


     


 


 


  (NS Flush)  2 ml  BID


 IV FLUSH  2/22/18 21:00


    2/25/18 09:09


 


 


  (Tylenol)  650 mg  Q4H  PRN


 PO  2/22/18 13:15


     


 


 


  (Narcan Inj)  0.4 mg  UNSCH  PRN


 IV PUSH  2/22/18 13:15


     


 


 


  (Halina-Colace)  1 tab  BID


 PO  2/22/18 21:00


     


 


 


  (Milk Of


 Magnesia Liq)  30 ml  Q12H  PRN


 PO  2/22/18 13:15


     


 


 


  (Senokot)  17.2 mg  Q12H  PRN


 PO  2/22/18 13:15


     


 


 


  (Dulcolax Supp)  10 mg  DAILY  PRN


 RECTAL  2/22/18 13:15


     


 


 


  (Lactulose Liq)  30 ml  DAILY  PRN


 PO  2/22/18 13:15


     


 


 


  (Morphine Inj)  2 mg  Q3H  PRN


 IV PUSH  2/22/18 13:15


    2/24/18 10:28


 


 


 Lactated Ringer's  1,000 ml @ 


 42 mls/hr  [id number]


 IV  2/23/18 10:05


    2/24/18 09:40


 


 


  (Lovenox Inj)  30 mg  Q24H


 SQ  2/24/18 09:30


    2/25/18 09:08


 


 


  (Norco  7.5-325


 Mg)  1 tab  Q3H  PRN


 PO  2/23/18 10:15


     


 


 


  (Oscal-D 250-125)  250 mg  TID


 PO  2/23/18 13:00


     


 


 


  (Benadryl)  25 mg  Q6H  PRN


 PO  2/23/18 10:15


     


 


 


  (Drisdol)  50,000 units  Q7D


 PO  2/23/18 11:00


     


 


 


  (Vitamin D3)  1,000 units  DAILY


 PO  2/24/18 09:00


     


 


 


  (Vitamin C)  1,000 mg  DAILY


 PO  2/24/18 09:00


     


 


 


  (Morphine Inj)  4 mg  Q3H  PRN


 IV PUSH  2/24/18 13:45


    2/25/18 09:08


 


 


  (Haldol Inj)  0.5 mg  Q6HR  PRN


 IV PUSH  2/24/18 12:15


    2/24/18 20:12


 


 


 Ceftriaxone


 Sodium 1000 mg/


 Sodium Chloride  100 ml @ 


 200 mls/hr  Q24H


 IV  2/24/18 20:00


    2/24/18 20:20


 








Vital Signs / I&O





Vital Signs








  Date Time  Temp Pulse Resp B/P (MAP) Pulse Ox O2 Delivery O2 Flow Rate FiO2


 


2/25/18 07:58  143  145/70    


 


2/25/18 07:40     94 Nasal Cannula 4.00 


 


2/25/18 07:00     94 Nasal Cannula 3.00 


 


2/25/18 06:00  110      


 


2/25/18 04:00  97      


 


2/25/18 04:00 98.1 97 19 119/70 (86) 98   


 


2/25/18 03:24     100 Nasal Cannula 4.00 


 


2/25/18 02:00  95      


 


2/25/18 00:00 98.0 97 16 142/74 (96) 99   


 


2/25/18 00:00  97      


 


2/24/18 22:00  90      


 


2/24/18 20:00 98.1 93 21 123/84 (97) 98   


 


2/24/18 20:00  93      


 


2/24/18 19:00     98 Nasal Cannula 3.00 


 


2/24/18 18:00  86      


 


2/24/18 16:00  85      


 


2/24/18 16:00 97.8 85 18 105/57 (73) 99   


 


2/24/18 14:21  110  165/83    


 


2/24/18 14:00  116      


 


2/24/18 12:00  116      


 


2/24/18 12:00 99.0 116 20 120/76 (91) 92   


 


2/24/18 11:53  112  131/70    


 


2/24/18 10:00  96      














I/O      


 


 2/24/18 2/24/18 2/24/18 2/25/18 2/25/18 2/25/18





 07:00 15:00 23:00 07:00 15:00 23:00


 


Intake Total 300 ml 1100 ml 100 ml   


 


Output Total 800 ml  600 ml 600 ml  


 


Balance -500 ml 1100 ml -500 ml -600 ml  


 


      


 


Intake Oral   0 ml   


 


IV Total 300 ml 1100 ml 100 ml   


 


Output Urine Total 800 ml  600 ml 600 ml  


 


# Bowel Movements   0 0  








Physical Exam


GENERAL: 


SKIN: Warm and dry.


HEAD: Normocephalic.


EYES: No scleral icterus. No injection or drainage. 


NECK: Supple, trachea midline. No JVD or lymphadenopathy.


CARDIOVASCULAR: Regular rate and rhythm without murmurs, gallops, or rubs. 


RESPIRATORY: Breath sounds equal bilaterally. No accessory muscle use.


GASTROINTESTINAL: Abdomen soft, non-tender, nondistended. 


MUSCULOSKELETAL: No cyanosis, or edema. 


BACK: Nontender without obvious deformity. No CVA tenderness.





Laboratory





Laboratory Tests








Test


  2/24/18


12:30 2/25/18


03:30


 


Urine Color YELLOW  


 


Urine Turbidity HAZY  


 


Urine pH 5.5  


 


Urine Specific Gravity 1.024  


 


Urine Protein 30 mg/dL  


 


Urine Glucose (UA) NEG mg/dL  


 


Urine Ketones 40 mg/dL  


 


Urine Occult Blood MOD  


 


Urine Nitrite NEG  


 


Urine Bilirubin NEG  


 


Urine Urobilinogen


  LESS THAN 2.0


MG/DL 


 


 


Urine Leukocyte Esterase MOD  


 


Urine RBC  /hpf  


 


Urine WBC 24 /hpf  


 


Urine Squamous Epithelial


Cells <1 /hpf 


  


 


 


Urine Transitional Epithelial


Cells <1 /hpf 


  


 


 


Urine Bacteria RARE /hpf  


 


Urine Mucus FEW /lpf  


 


Microscopic Urinalysis Comment


  CATH-CULTURE


IND 


 


 


White Blood Count  9.1 TH/MM3 


 


Red Blood Count  3.13 MIL/MM3 


 


Hemoglobin  8.7 GM/DL 


 


Hematocrit  26.0 % 


 


Mean Corpuscular Volume  83.2 FL 


 


Mean Corpuscular Hemoglobin  27.7 PG 


 


Mean Corpuscular Hemoglobin


Concent 


  33.3 % 


 


 


Red Cell Distribution Width  16.8 % 


 


Platelet Count  271 TH/MM3 


 


Mean Platelet Volume  9.5 FL 


 


Neutrophils (%) (Auto)  76.6 % 


 


Lymphocytes (%) (Auto)  12.3 % 


 


Monocytes (%) (Auto)  8.7 % 


 


Eosinophils (%) (Auto)  1.3 % 


 


Basophils (%) (Auto)  1.1 % 


 


Neutrophils # (Auto)  7.0 TH/MM3 


 


Lymphocytes # (Auto)  1.1 TH/MM3 


 


Monocytes # (Auto)  0.8 TH/MM3 


 


Eosinophils # (Auto)  0.1 TH/MM3 


 


Basophils # (Auto)  0.1 TH/MM3 


 


CBC Comment  DIFF FINAL 


 


Differential Comment   


 


Blood Urea Nitrogen  9 MG/DL 


 


Creatinine  0.37 MG/DL 


 


Random Glucose  79 MG/DL 


 


Calcium Level  8.2 MG/DL 


 


Sodium Level  138 MEQ/L 


 


Potassium Level  3.6 MEQ/L 


 


Chloride Level  104 MEQ/L 


 


Carbon Dioxide Level  25.7 MEQ/L 


 


Anion Gap  8 MEQ/L 


 


Estimat Glomerular Filtration


Rate 


  167 ML/MIN 


 











Assessment and Plan


Problem List:  


(1) New onset a-fib


ICD Codes:  I48.91 - Unspecified atrial fibrillation


Status:  Acute


(2) Atrial fibrillation with RVR


ICD Codes:  I48.91 - Unspecified atrial fibrillation


Status:  Acute


(3) Dementia


ICD Codes:  F03.90 - Unspecified dementia without behavioral disturbance


Status:  Chronic


(4) Fracture, tibia and fibula, shaft


ICD Codes:  S82.209A - Unspecified fracture of shaft of unspecified tibia, 

initial encounter for closed fracture; S82.409A - Unspecified fracture of shaft 

of unspecified fibula, initial encounter for closed fracture


Status:  Acute


Assessment and Plan


1.) Afib- rate controlled, pod # 2, ortho surgery, ac indeterminate due to 

dementia, code status, ac and anemia will need to be addressed prior to 

discharge, will consult palliative care to help define treatment goals with 

family





Problem Qualifiers





(1) Dementia:  


Qualified Codes:  F03.90 - Unspecified dementia without behavioral disturbance


(2) Fracture, tibia and fibula, shaft:  


Qualified Codes:  S82.202A - Unspecified fracture of shaft of left tibia, 

initial encounter for closed fracture; S82.402A - Unspecified fracture of shaft 

of left fibula, initial encounter for closed fracture








Jayden Lama MD Feb 25, 2018 09:33

## 2018-02-26 VITALS
HEART RATE: 140 BPM | RESPIRATION RATE: 20 BRPM | DIASTOLIC BLOOD PRESSURE: 73 MMHG | SYSTOLIC BLOOD PRESSURE: 118 MMHG | TEMPERATURE: 98.8 F | OXYGEN SATURATION: 94 %

## 2018-02-26 VITALS
OXYGEN SATURATION: 97 % | TEMPERATURE: 97.9 F | RESPIRATION RATE: 18 BRPM | DIASTOLIC BLOOD PRESSURE: 56 MMHG | HEART RATE: 93 BPM | SYSTOLIC BLOOD PRESSURE: 114 MMHG

## 2018-02-26 VITALS — HEART RATE: 86 BPM

## 2018-02-26 VITALS
TEMPERATURE: 98.1 F | RESPIRATION RATE: 18 BRPM | DIASTOLIC BLOOD PRESSURE: 69 MMHG | OXYGEN SATURATION: 95 % | HEART RATE: 119 BPM | SYSTOLIC BLOOD PRESSURE: 126 MMHG

## 2018-02-26 VITALS
HEART RATE: 103 BPM | TEMPERATURE: 98.7 F | DIASTOLIC BLOOD PRESSURE: 57 MMHG | RESPIRATION RATE: 17 BRPM | OXYGEN SATURATION: 95 % | SYSTOLIC BLOOD PRESSURE: 98 MMHG

## 2018-02-26 VITALS
OXYGEN SATURATION: 91 % | SYSTOLIC BLOOD PRESSURE: 137 MMHG | TEMPERATURE: 98.1 F | DIASTOLIC BLOOD PRESSURE: 70 MMHG | HEART RATE: 98 BPM | RESPIRATION RATE: 18 BRPM

## 2018-02-26 VITALS — HEART RATE: 98 BPM

## 2018-02-26 VITALS
OXYGEN SATURATION: 95 % | SYSTOLIC BLOOD PRESSURE: 116 MMHG | RESPIRATION RATE: 12 BRPM | TEMPERATURE: 97.9 F | HEART RATE: 97 BPM | DIASTOLIC BLOOD PRESSURE: 58 MMHG

## 2018-02-26 VITALS — HEART RATE: 84 BPM

## 2018-02-26 VITALS
SYSTOLIC BLOOD PRESSURE: 90 MMHG | TEMPERATURE: 98 F | DIASTOLIC BLOOD PRESSURE: 62 MMHG | OXYGEN SATURATION: 96 % | RESPIRATION RATE: 17 BRPM | HEART RATE: 99 BPM

## 2018-02-26 VITALS — HEART RATE: 136 BPM

## 2018-02-26 VITALS — HEART RATE: 88 BPM

## 2018-02-26 VITALS — OXYGEN SATURATION: 97 %

## 2018-02-26 VITALS — HEART RATE: 97 BPM

## 2018-02-26 LAB
BUN SERPL-MCNC: 5 MG/DL (ref 7–18)
CALCIUM SERPL-MCNC: 8.6 MG/DL (ref 8.5–10.1)
CHLORIDE SERPL-SCNC: 103 MEQ/L (ref 98–107)
CREAT SERPL-MCNC: 0.5 MG/DL (ref 0.5–1)
ERYTHROCYTE [DISTWIDTH] IN BLOOD BY AUTOMATED COUNT: 16.8 % (ref 11.6–17.2)
GFR SERPLBLD BASED ON 1.73 SQ M-ARVRAT: 118 ML/MIN (ref 89–?)
GLUCOSE SERPL-MCNC: 115 MG/DL (ref 74–106)
HCO3 BLD-SCNC: 28.8 MEQ/L (ref 21–32)
HCT VFR BLD CALC: 28.8 % (ref 35–46)
HGB BLD-MCNC: 9.4 GM/DL (ref 11.6–15.3)
MCH RBC QN AUTO: 27.3 PG (ref 27–34)
MCHC RBC AUTO-ENTMCNC: 32.8 % (ref 32–36)
MCV RBC AUTO: 83.1 FL (ref 80–100)
PLATELET # BLD: 407 TH/MM3 (ref 150–450)
PMV BLD AUTO: 9 FL (ref 7–11)
RBC # BLD AUTO: 3.46 MIL/MM3 (ref 4–5.3)
SODIUM SERPL-SCNC: 138 MEQ/L (ref 136–145)
WBC # BLD AUTO: 9.1 TH/MM3 (ref 4–11)

## 2018-02-26 RX ADMIN — SODIUM CHLORIDE SCH MLS/HR: 900 INJECTION INTRAVENOUS at 21:28

## 2018-02-26 RX ADMIN — DILTIAZEM HYDROCHLORIDE SCH MG: 30 TABLET, FILM COATED ORAL at 18:00

## 2018-02-26 RX ADMIN — ENOXAPARIN SODIUM SCH MG: 30 INJECTION SUBCUTANEOUS at 09:34

## 2018-02-26 RX ADMIN — CALCIUM CARBONATE-CHOLECALCIFEROL TAB 250 MG-125 UNIT SCH MG: 250-125 TAB at 12:40

## 2018-02-26 RX ADMIN — STANDARDIZED SENNA CONCENTRATE AND DOCUSATE SODIUM SCH TAB: 8.6; 5 TABLET, FILM COATED ORAL at 09:33

## 2018-02-26 RX ADMIN — Medication SCH ML: at 09:34

## 2018-02-26 RX ADMIN — DILTIAZEM HYDROCHLORIDE SCH MG: 30 TABLET, FILM COATED ORAL at 12:40

## 2018-02-26 RX ADMIN — MORPHINE SULFATE PRN MG: 2 INJECTION, SOLUTION INTRAMUSCULAR; INTRAVENOUS at 03:52

## 2018-02-26 RX ADMIN — Medication SCH ML: at 21:30

## 2018-02-26 RX ADMIN — OXYCODONE HYDROCHLORIDE AND ACETAMINOPHEN SCH MG: 500 TABLET ORAL at 09:33

## 2018-02-26 RX ADMIN — MORPHINE SULFATE PRN MG: 2 INJECTION, SOLUTION INTRAMUSCULAR; INTRAVENOUS at 11:52

## 2018-02-26 RX ADMIN — CALCIUM CARBONATE-CHOLECALCIFEROL TAB 250 MG-125 UNIT SCH MG: 250-125 TAB at 18:00

## 2018-02-26 RX ADMIN — STANDARDIZED SENNA CONCENTRATE AND DOCUSATE SODIUM SCH TAB: 8.6; 5 TABLET, FILM COATED ORAL at 21:30

## 2018-02-26 RX ADMIN — CALCIUM CARBONATE-CHOLECALCIFEROL TAB 250 MG-125 UNIT SCH MG: 250-125 TAB at 09:32

## 2018-02-26 RX ADMIN — DILTIAZEM HYDROCHLORIDE SCH MG: 30 TABLET, FILM COATED ORAL at 23:26

## 2018-02-26 RX ADMIN — VITAMIN D, TAB 1000IU (100/BT) SCH UNITS: 25 TAB at 09:33

## 2018-02-26 RX ADMIN — MAGNESIUM SULFATE HEPTAHYDRATE SCH MLS/HR: 500 INJECTION, SOLUTION INTRAMUSCULAR; INTRAVENOUS at 02:52

## 2018-02-26 NOTE — HHI.FPPN
Subjective


Remarks


Per nursing, she continues to not eat.  She knows her name but nothing else.  

She repeats the same word over and over.


Postop day #3.  History severely limited secondary to mental state.


 (Will Tuttle MD, R3)





Objective


Vitals





Vital Signs








  Date Time  Temp Pulse Resp B/P (MAP) Pulse Ox O2 Delivery O2 Flow Rate FiO2


 


2/26/18 07:34     97 Nasal Cannula 4.00 


 


2/26/18 06:00  86      


 


2/26/18 04:00  93      


 


2/26/18 04:00 97.9 93 18 114/56 (75) 97   


 


2/26/18 02:00  84      


 


2/26/18 00:00  99      


 


2/26/18 00:00 98.0 99 17 90/62 (71) 96   


 


2/25/18 22:32  85  136/66    


 


2/25/18 22:00  80      


 


2/25/18 20:42     96 Nasal Cannula 4.00 


 


2/25/18 20:00  87      


 


2/25/18 20:00 98.1 87 15 131/74 (93) 100   


 


2/25/18 19:00     98 Nasal Cannula 3.00 


 


2/25/18 18:00  102      


 


2/25/18 16:00 98.0 106 21 142/68 (92) 92   


 


2/25/18 16:00  104      


 


2/25/18 14:00  102      


 


2/25/18 12:00  96      


 


2/25/18 12:00 98.2 96 19 138/74 (95) 99   


 


2/25/18 10:00  90      


 


2/25/18 09:46  100  138/59    














I/O      


 


 2/25/18 2/25/18 2/25/18 2/26/18 2/26/18 2/26/18





 07:00 15:00 23:00 07:00 15:00 23:00


 


Intake Total  800 ml 550 ml 1000 ml  


 


Output Total 600 ml  1375 ml 800 ml  


 


Balance -600 ml 800 ml -825 ml 200 ml  


 


      


 


Intake Oral   350 ml   


 


IV Total  800 ml 200 ml 1000 ml  


 


Output Urine Total 600 ml  1375 ml 800 ml  


 


# Bowel Movements 0  0 0  








 (Will Tuttle MD, R3)


Result Diagram:  


2/25/18 0330                                                                   

             2/25/18 0330





Objective Remarks


GENERAL: This is a thin, elderly female sitting upright.  No acute distress.  

Does not respond to questions. repeats words at times and is silent at times. 

no meaningful interaction. just chewed on her straw


SKIN: No rashes, ecchymoses or lesions. 


HEAD: Atraumatic. Normocephalic. 


EYES:  No scleral icterus. No injection or drainage. 


ENT: Nose without drainage.


NECK: Trachea midline. No JVD or lymphadenopathy.  no meningeal signs.


CARDIOVASCULAR: regular rate with irregularly irregular rhythm without murmur, 

gallop, or rub. 


RESPIRATORY: Clear to auscultation. Breath sounds equal bilaterally. No wheezes

, rales, or rhonchi.  


GASTROINTESTINAL: Abdomen soft, non-tender, nondistended. No hepato-splenomegaly

, or palpable masses. No guarding.


MUSCULOSKELETAL: Extremities without clubbing, cyanosis, or edema. No edema of 

RLE. LLE in boot and ace bandage to the knee. No right calf tenderness. 


NEUROLOGICAL: Awake. Not oriented. Motor and sensory grossly within normal 

limits. cannot cooperate


 (Will Tuttle MD, R3)





A/P


Assessment and Plan


81YO female w/PMHx dementia presents from shelter with comminuted fractures of 

distal 1/3 of left tibia and fibula and new onset atrail fibrillation with RVR.


Discharge Planning


Pending clinical improvement


grandchildren are [facility] [place] [phone]


 (Will Tuttle MD, R3)


Attending Attestation


Patient seen and examined.  Case reviewed and discussed with the resident team.

  Agree with plan of care as discussed with me and documented in the resident 

note.


will be sure of her family's wishes as she has a living will. her dementia is 

pervasive and she will not be able to cooperate with any rehab. will need to 

consider PEG tube if she doesn't eat much and that is what her family wants


 (Jessica Quarles MD)


Problem List:  


(1) Delirium


ICD Codes:  R41.0 - Disorientation, unspecified


Status:  Acute


Plan:  Patient appears to be still delirious vs severe baseline dementia this 

morning.





Nonpharmacologic treatment: Maintain orientation if possible, minimize sleep 

deprivation, maintain mobility (out of bed to chair with PT) if and when able


Consider pharmacologic treatment if needed: Haloperidol when necessary





restraints because the patient is removing her mask and is desatting





(2) Fracture, tibia and fibula, shaft


ICD Codes:  S82.209A - Unspecified fracture of shaft of unspecified tibia, 

initial encounter for closed fracture; S82.409A - Unspecified fracture of shaft 

of unspecified fibula, initial encounter for closed fracture


Status:  Acute


Plan:  -Orthopedics consult (Dr Velásquez) 


will continue to follow recs





(3) Pulmonary edema


ICD Codes:  J81.1 - Chronic pulmonary edema


Status:  Acute


Plan:  Chest xray on 2/24 concerning for pulmonary edema.


Careful with IV fluids, however patient is not eating yet


Sit upright in bed


Consider lasix and repeat chest xray if SOB





(4) Atrial fibrillation with RVR


ICD Codes:  I48.91 - Unspecified atrial fibrillation


Status:  Acute


Plan:  -Cardiology consult--appreciate recs


Patient not currently tolerating PO and is delirious/ severely demented. 

Continue diltiazem drip.





(5) Dementia


ICD Codes:  F03.90 - Unspecified dementia without behavioral disturbance


Status:  Chronic


Plan:  Stable. Takes no medications at [facility]





(6) FEN/GI/PPx


Status:  Acute


Plan:  Fluids: D5 NS IVF @80ml/hr


Electrolytes: will monitor and replete as necessary


Nutrition: patient not currently eating


discussed with family re potential tube placement


GI: not indicated


PPx: SCD on RLE; lovenox 30 per ortho





CM to assist with eventual SNF


PT tomorrow to eval and treat, doesn't appear she can cooperate


 (Will Tuttle MD, R3)





Problem Qualifiers





(1) Fracture, tibia and fibula, shaft:  


Qualified Codes:  S82.202A - Unspecified fracture of shaft of left tibia, 

initial encounter for closed fracture; S82.402A - Unspecified fracture of shaft 

of left fibula, initial encounter for closed fracture


(2) Pulmonary edema:  


Qualified Codes:  J81.0 - Acute pulmonary edema


(3) Dementia:  


Qualified Codes:  F03.90 - Unspecified dementia without behavioral disturbance








Will Tuttle MD, R3 Feb 26, 2018 09:18


Jessica Quarles MD Feb 27, 2018 13:10

## 2018-02-26 NOTE — PD.CARD.PN
Subjective


Subjective Remarks


appears sedated in nad





Objective


Medications





Current Medications








 Medications


  (Trade)  Dose


 Ordered  Sig/César


 Route  Start Time


 Stop Time Status Last Admin


 


  (NS Flush)  2 ml  UNSCH  PRN


 IV FLUSH  2/22/18 13:15


     


 


 


  (NS Flush)  2 ml  BID


 IV FLUSH  2/22/18 21:00


    2/26/18 09:34


 


 


  (Tylenol)  650 mg  Q4H  PRN


 PO  2/22/18 13:15


     


 


 


  (Narcan Inj)  0.4 mg  UNSCH  PRN


 IV PUSH  2/22/18 13:15


     


 


 


  (Halina-Colace)  1 tab  BID


 PO  2/22/18 21:00


    2/26/18 09:33


 


 


  (Milk Of


 Magnesia Liq)  30 ml  Q12H  PRN


 PO  2/22/18 13:15


     


 


 


  (Senokot)  17.2 mg  Q12H  PRN


 PO  2/22/18 13:15


     


 


 


  (Dulcolax Supp)  10 mg  DAILY  PRN


 RECTAL  2/22/18 13:15


     


 


 


  (Lactulose Liq)  30 ml  DAILY  PRN


 PO  2/22/18 13:15


     


 


 


  (Morphine Inj)  2 mg  Q3H  PRN


 IV PUSH  2/22/18 13:15


    2/24/18 10:28


 


 


  (Lovenox Inj)  30 mg  Q24H


 SQ  2/24/18 09:30


    2/26/18 09:34


 


 


  (Norco  7.5-325


 Mg)  1 tab  Q3H  PRN


 PO  2/23/18 10:15


     


 


 


  (Oscal-D 250-125)  250 mg  TID


 PO  2/23/18 13:00


    2/26/18 12:40


 


 


  (Benadryl)  25 mg  Q6H  PRN


 PO  2/23/18 10:15


     


 


 


  (Drisdol)  50,000 units  Q7D


 PO  2/23/18 11:00


     


 


 


  (Vitamin D3)  1,000 units  DAILY


 PO  2/24/18 09:00


    2/26/18 09:33


 


 


  (Vitamin C)  1,000 mg  DAILY


 PO  2/24/18 09:00


    2/26/18 09:33


 


 


  (Morphine Inj)  4 mg  Q3H  PRN


 IV PUSH  2/24/18 13:45


    2/26/18 11:52


 


 


  (Haldol Inj)  0.5 mg  Q6HR  PRN


 IV PUSH  2/24/18 12:15


    2/24/18 20:12


 


 


 Ceftriaxone


 Sodium 1000 mg/


 Sodium Chloride  100 ml @ 


 200 mls/hr  Q24H


 IV  2/24/18 20:00


    2/25/18 20:22


 


 


 Dextrose/Sodium


 Chloride  1,000 ml @ 


 80 mls/hr  [id number]


 IV  2/25/18 13:00


   Future hold 2/26/18 02:52


 


 


  (Cardizem)  30 mg  Q6HR


 PO  2/26/18 12:00


    2/26/18 12:40


 


 


 Diltiazem HCl 125


 mg/Sodium Chloride  125 ml @ 5


 mls/hr  TITRATE  PRN


 IV  2/26/18 11:00


     


 


 


 Potassium


 Chloride/Dextrose/


 Sod Cl  1,000 ml @ 


 80 mls/hr  [id number] ONCE


 IV  2/26/18 13:45


 2/27/18 02:14   


 








Vital Signs / I&O





Vital Signs








  Date Time  Temp Pulse Resp B/P (MAP) Pulse Ox O2 Delivery O2 Flow Rate FiO2


 


2/26/18 10:45  77  97/52    


 


2/26/18 10:00  88      


 


2/26/18 08:00  97      


 


2/26/18 08:00 97.9 97 12 116/58 (77) 95   


 


2/26/18 07:34     97 Nasal Cannula 4.00 


 


2/26/18 07:30     98 Nasal Cannula 3.00 


 


2/26/18 06:00  86      


 


2/26/18 04:00  93      


 


2/26/18 04:00 97.9 93 18 114/56 (75) 97   


 


2/26/18 02:00  84      


 


2/26/18 00:00  99      


 


2/26/18 00:00 98.0 99 17 90/62 (71) 96   


 


2/25/18 22:32  85  136/66    


 


2/25/18 22:00  80      


 


2/25/18 20:42     96 Nasal Cannula 4.00 


 


2/25/18 20:00  87      


 


2/25/18 20:00 98.1 87 15 131/74 (93) 100   


 


2/25/18 19:00     98 Nasal Cannula 3.00 


 


2/25/18 18:00  102      


 


2/25/18 16:00 98.0 106 21 142/68 (92) 92   


 


2/25/18 16:00  104      














I/O      


 


 2/25/18 2/25/18 2/25/18 2/26/18 2/26/18 2/26/18





 07:00 15:00 23:00 07:00 15:00 23:00


 


Intake Total  800 ml 550 ml 1000 ml 35 ml 


 


Output Total 600 ml  1375 ml 800 ml  


 


Balance -600 ml 800 ml -825 ml 200 ml 35 ml 


 


      


 


Intake Oral   350 ml   


 


IV Total  800 ml 200 ml 1000 ml 35 ml 


 


Output Urine Total 600 ml  1375 ml 800 ml  


 


# Bowel Movements 0  0 0  








Physical Exam


GENERAL: 


SKIN: Warm and dry.


HEAD: Normocephalic.


EYES: No scleral icterus. No injection or drainage. 


NECK: Supple, trachea midline. No JVD or lymphadenopathy.


CARDIOVASCULAR: Regular rate and rhythm without murmurs, gallops, or rubs. 


RESPIRATORY: Breath sounds equal bilaterally. No accessory muscle use.


GASTROINTESTINAL: Abdomen soft, non-tender, nondistended. 


MUSCULOSKELETAL: No cyanosis, or edema. 


BACK: Nontender without obvious deformity. No CVA tenderness.





Laboratory





Laboratory Tests








Test


  2/25/18


16:50 2/26/18


09:39


 


Nasal Screen MRSA (PCR)


  MRSA NOT


DETECTED 


 


 


White Blood Count  9.1 TH/MM3 


 


Red Blood Count  3.46 MIL/MM3 


 


Hemoglobin  9.4 GM/DL 


 


Hematocrit  28.8 % 


 


Mean Corpuscular Volume  83.1 FL 


 


Mean Corpuscular Hemoglobin  27.3 PG 


 


Mean Corpuscular Hemoglobin


Concent 


  32.8 % 


 


 


Red Cell Distribution Width  16.8 % 


 


Platelet Count  407 TH/MM3 


 


Mean Platelet Volume  9.0 FL 


 


Blood Urea Nitrogen  5 MG/DL 


 


Creatinine  0.50 MG/DL 


 


Random Glucose  115 MG/DL 


 


Calcium Level  8.6 MG/DL 


 


Sodium Level  138 MEQ/L 


 


Potassium Level  3.3 MEQ/L 


 


Chloride Level  103 MEQ/L 


 


Carbon Dioxide Level  28.8 MEQ/L 


 


Anion Gap  6 MEQ/L 


 


Estimat Glomerular Filtration


Rate 


  118 ML/MIN 


 











Assessment and Plan


Problem List:  


(1) New onset a-fib


ICD Codes:  I48.91 - Unspecified atrial fibrillation


Status:  Acute


(2) Atrial fibrillation with RVR


ICD Codes:  I48.91 - Unspecified atrial fibrillation


Status:  Acute


(3) Dementia


ICD Codes:  F03.90 - Unspecified dementia without behavioral disturbance


Status:  Chronic


(4) Fracture, tibia and fibula, shaft


ICD Codes:  S82.209A - Unspecified fracture of shaft of unspecified tibia, 

initial encounter for closed fracture; S82.409A - Unspecified fracture of shaft 

of unspecified fibula, initial encounter for closed fracture


Status:  Acute


Assessment and Plan


1.) Afib- rate controlled, pod # 3, ortho surgery, ac indeterminate due to 

dementia, full code status, ac and anemia will need to be addressed prior to 

discharge, will consult palliative care to help define treatment goals with 

family





Problem Qualifiers





(1) Dementia:  


Qualified Codes:  F03.90 - Unspecified dementia without behavioral disturbance


(2) Fracture, tibia and fibula, shaft:  


Qualified Codes:  S82.202A - Unspecified fracture of shaft of left tibia, 

initial encounter for closed fracture; S82.402A - Unspecified fracture of shaft 

of left fibula, initial encounter for closed fracture








Jayden Lama MD Feb 26, 2018 14:58

## 2018-02-26 NOTE — PD.ORT.PN
Subjective


Subjective Remarks


POD 3 s/p IMN left tibia


confused. does not communicate.





Objective


Vitals





Vital Signs








  Date Time  Temp Pulse Resp B/P (MAP) Pulse Ox O2 Delivery O2 Flow Rate FiO2


 


2/26/18 06:00  86      


 


2/26/18 04:00  93      


 


2/26/18 04:00 97.9 93 18 114/56 (75) 97   


 


2/26/18 02:00  84      


 


2/26/18 00:00  99      


 


2/26/18 00:00 98.0 99 17 90/62 (71) 96   


 


2/25/18 22:32  85  136/66    


 


2/25/18 22:00  80      


 


2/25/18 20:42     96 Nasal Cannula 4.00 


 


2/25/18 20:00  87      


 


2/25/18 20:00 98.1 87 15 131/74 (93) 100   


 


2/25/18 19:00     98 Nasal Cannula 3.00 


 


2/25/18 18:00  102      


 


2/25/18 16:00 98.0 106 21 142/68 (92) 92   


 


2/25/18 16:00  104      


 


2/25/18 14:00  102      


 


2/25/18 12:00  96      


 


2/25/18 12:00 98.2 96 19 138/74 (95) 99   


 


2/25/18 10:00  90      


 


2/25/18 09:46  100  138/59    


 


2/25/18 08:00  136      


 


2/25/18 08:00 98.4 130 23 151/78 (102) 93   


 


2/25/18 07:58  143  145/70    


 


2/25/18 07:40     94 Nasal Cannula 4.00 


 


2/25/18 07:00     94 Nasal Cannula 3.00 














I/O      


 


 2/25/18 2/25/18 2/25/18 2/26/18 2/26/18 2/26/18





 07:00 15:00 23:00 07:00 15:00 23:00


 


Intake Total  800 ml 550 ml 1000 ml  


 


Output Total 600 ml  1375 ml 800 ml  


 


Balance -600 ml 800 ml -825 ml 200 ml  


 


      


 


Intake Oral   350 ml   


 


IV Total  800 ml 200 ml 1000 ml  


 


Output Urine Total 600 ml  1375 ml 800 ml  


 


# Bowel Movements 0  0 0  








Result Diagram:  


2/25/18 0330                                                                   

             2/25/18 0330





Imaging





Last 24 hours Impressions








Chest X-Ray 2/22/18 1006 Signed





Impressions: 





 Service Date/Time:  Thursday, February 22, 2018 10:40 - CONCLUSION:  1. No 





 infiltrates seen. 2. Cardiomegaly and fullness of the central bronchopulmonary 





 markings suggest pulmonary venous hypertension.     Dylan Candelaria MD 








Procedures


Left Distal 1/3 Tibia and fibula shaft fxs s/p IMN by Dr Velásquez on 2/23/18


Objective Remarks


LLE: dressings clean and dry. intact. nvi, stable capillary refill, 1+ pedal 

edema, no change.





Assessment & Plan


Problem List:  


(1) Fracture, tibia and fibula, shaft


ICD Codes:  S82.209A - Unspecified fracture of shaft of unspecified tibia, 

initial encounter for closed fracture; S82.409A - Unspecified fracture of shaft 

of unspecified fibula, initial encounter for closed fracture


Status:  Acute


Qualifiers:  


   Qualified Codes:  S82.202A - Unspecified fracture of shaft of left tibia, 

initial encounter for closed fracture; S82.402A - Unspecified fracture of shaft 

of left fibula, initial encounter for closed fracture


Assessment and Plan


1) Left Distal 1/3 Tibia and fibula shaft fxs s/p IMN by Dr Velásquez on 2/23/18 - 

POD 3


   -NWB LLE


   -daily dressing changes to begin POD 2 with xeroform/4x4/ACE. 


   -CM for rehab placement


   -DVT prophylaxis


   -Scripts on chart


   - Discharge planning 


   -f/u with [facility] or [facility] in 2 weeks











Cesario Latham/[facility] PA Feb 26, 2018 06:55

## 2018-02-26 NOTE — PD.CONS
Consult


Service


Palliative Care


Consult Requested By


Jayden Lama MD


Primary Care Physician


Jhon Betts MD


Reason for Consultation


   a.  To assist with evaluation and management of symptoms including: Confusion

, agitation


   b.  To assist medical decision maker(s) with: better understanding of 

current medical conditions; weighing benefits/burdens of medical treatment 

options; making        


        medical treatment decisions.





HPI


History of Present Illness


This is an 82-year-old  female brought from [facility] after sustaining a fall.  She is wheelchair-bound, normally and 

during transfer she sustained a fall fracturing left tibia and fibula.  Upon 

evaluation she was found to have new onset atrial fibrillation with rapid 

ventricular response with a heart rate of 160.  She has baseline dementia and 

is unable to provide any information.  Evaluation indicates she is oriented to 

self only.  She is status post surgical repair of her left leg fracture.  She 

has received intravenous Cardizem for acute stabilization of her atrial 

arrhythmia and is now on oral medications with a controlled heart rate.  She 

takes no chronic medications.





ED course:


Initial evaluation revealed a shortened and abnormally rotated left leg.  This 

was stabilized and rotated with traction by the ED physician and placed in a 

splint pending orthopedic evaluation.


* Radiology: Tibia fibula x-ray showed comminuted and angulated fractures of 

the distal one third shaft of the tibia and fibula.  Pelvis x-ray showed bony 

pelvic ring grossly intact with symmetric appearance of bilateral total hip 

arthroplasty.  Femur x-ray revealed no fracture but possible knee effusion.  

Chest x-ray showed no infiltrates.  Cardiomegaly and fullness of the central 

bronchopulmonary markings suggest pulmonary venous hypertension.


* Laboratory: WBC 13.9, Hgb 10.8, HCT 33.3, , sodium 138, potassium 3.9, 

BUN 18, creatinine 0.69, AST 46, ALT 46, alkaline phosphatase 157, total 

creatinine kinase 247, troponin 0 0.08.  Urine culture showed likely 

contaminant.





Patient is seen in [facility], 1310, sitting up in bed, trying to get up.  Her left 

leg is in surgical dressings showing significant swelling, no pitting edema.  

Vital signs are stable, blood pressure 116/58, heart rate 77, respiratory rate 

12, oxygen saturation 95% on 2 L nasal cannula, afebrile.  Patient is not 

oriented.  She does not make eye contact with examiner, does not follow 

commands.  Nurse stated she was able to answer some simple questions earlier 

today.  She recently received morphine for discomfort in repositioning and 

dressing changes.  She will say "Ow" with repositioning or movement and has 

some difficulty making her needs known.  There is no family at bedside at this 

time.  Calls have been placed to contact family, pending call back from 

daughter.  Granddaughter states they will visit around 3:30.


Function/Cognitive Trajectory


Previous entry in the chart from 2014 indicates the patient had 

advanced dementia at that time but was able to respond to questions.  At the 

time of admission to the [facility], I am informed by the  there that she 

was able to walk.  She has declined over that for year.  And is now wheelchair-

bound, unable to make her needs known with severe dementia.  


.





Review of Systems


ROS Limitations:  Clinical Condition, Altered Mental Status (Patient is 

nonverbal and unable to provide their own ROS.  10 part ROS taken as best as 

possible from medical record and available family.)


Constitutional:  COMPLAINS OF: Generalized weakness


Endocrine:  DENIES: Abnorml menstrual pattern, Heat/cold intolerance, Polydipsia

, Polyuria, Polyphagia


Eyes:  DENIES: Blurred vision, Diplopia, Eye inflammation, Eye pain, Vision loss

, Photosensitivity, Double Vision, Blind spots


Ears, nose, mouth, throat:  DENIES: Tinnitus, Hearing loss, Vertigo, Nasal 

discharge, Oral lesions, Throat pain, Hoarseness, Ear Pain, Running Nose, 

Epistaxis, Sinus Pain, Toothache, Odynophagia


Respiratory:  DENIES: Apneas, Cough, Snoring, Wheezing, Hemoptysis, Sputum 

production, Shortness of breath


Cardiovascular:  DENIES: Chest pain, Palpitations, Syncope, Dyspnea on Exertion

, PND, Lower Extremity Edema, Orthopnea, Claudication


Gastrointestinal:  DENIES: Abdominal pain, Black stools, Bloody stools, 

Constipation, Diarrhea, Nausea, Vomiting, Difficulty Swallowing, Anorexia, 

Dyspepsia or heartburn, Excessive gas, Bloating, Vomiting blood


Genitourinary:  DENIES: Abnormal vaginal bleeding, Dysmenorrhea, Dyspareunia, 

Sexual dysfunction, Urinary frequency, Urinary incontinence, Urgency, Hematuria

, Dysuria, Nocturia, Vaginal discharge, Hesitancy, Dribbling, Decreased stream


Musculoskeletal:  COMPLAINS OF: Joint pain, Decreased range of motion


Integumentary:  DENIES: Abnormal pigmentation, Pruritus, Rash, Nail changes, 

Breast masses, Breast skin changes, Nipple discharge, Nodules, Tumors, 

Excessive dryness, Non-healing sores


Hematologic/Lymphatics:  DENIES: Bruising, Lymphadenopathy, Prolonged bleed w/ 

proced, History of transfusions


Immunologic/Allergic:  DENIES: Eczema, Urticaria


Neurologic:  COMPLAINS OF: Abnormal gait


Psychiatric:  COMPLAINS OF: Confusion





Past Family Social History


Coded Allergies:  


     No Known Allergies (Unverified , 18)


Past Medical History


Dementia


Fractures of both hands and wrists several years ago


History of alcoholism


Osteoporosis





.


Past Surgical History


Bilateral hip replacement


Reduction and intramedullary nail fixation of the left tibia





.


Reported Medications





Reported Meds & Active Scripts


Active


Calcium 600+D 200 (Calcium Carbonate-Vitamin D) 600-200 Mg-Unit Tab 1 Tab PO BID


Ergocalciferol 50,000 Unit Cap 50,000 Units PO Q7D


Norco (Hydrocodone-Acetaminophen) 5 Mg-325 Mg Tab 1 Tab PO Q4H PRN


.





Current Medications








 Medications


  (Trade)  Dose


 Ordered  Sig/César


 Route  Start Time


 Stop Time Status Last Admin


 


  (NS Flush)  2 ml  UNSCH  PRN


 IV FLUSH  18 13:15


     


 


 


  (NS Flush)  2 ml  BID


 IV FLUSH  18 21:00


    18 09:34


 


 


  (Tylenol)  650 mg  Q4H  PRN


 PO  18 13:15


     


 


 


  (Narcan Inj)  0.4 mg  UNSCH  PRN


 IV PUSH  18 13:15


     


 


 


  (Halina-Colace)  1 tab  BID


 PO  18 21:00


    18 09:33


 


 


  (Milk Of


 Magnesia Liq)  30 ml  Q12H  PRN


 PO  18 13:15


     


 


 


  (Senokot)  17.2 mg  Q12H  PRN


 PO  18 13:15


     


 


 


  (Dulcolax Supp)  10 mg  DAILY  PRN


 RECTAL  18 13:15


     


 


 


  (Lactulose Liq)  30 ml  DAILY  PRN


 PO  18 13:15


     


 


 


  (Morphine Inj)  2 mg  Q3H  PRN


 IV PUSH  18 13:15


    18 10:28


 


 


  (Lovenox Inj)  30 mg  Q24H


 SQ  18 09:30


    18 09:34


 


 


  (Norco  7.5-325


 Mg)  1 tab  Q3H  PRN


 PO  18 10:15


     


 


 


  (Oscal-D 250-125)  250 mg  TID


 PO  18 13:00


    18 12:40


 


 


  (Benadryl)  25 mg  Q6H  PRN


 PO  18 10:15


     


 


 


  (Drisdol)  50,000 units  Q7D


 PO  18 11:00


     


 


 


  (Vitamin D3)  1,000 units  DAILY


 PO  18 09:00


    18 09:33


 


 


  (Vitamin C)  1,000 mg  DAILY


 PO  18 09:00


    18 09:33


 


 


  (Morphine Inj)  4 mg  Q3H  PRN


 IV PUSH  18 13:45


    18 11:52


 


 


  (Haldol Inj)  0.5 mg  Q6HR  PRN


 IV PUSH  18 12:15


    18 20:12


 


 


 Ceftriaxone


 Sodium 1000 mg/


 Sodium Chloride  100 ml @ 


 200 mls/hr  Q24H


 IV  18 20:00


    18 20:22


 


 


 Dextrose/Sodium


 Chloride  1,000 ml @ 


 80 mls/hr  [id number]


 IV  18 13:00


   Future hold 18 02:52


 


 


  (Cardizem)  30 mg  Q6HR


 PO  18 12:00


    18 12:40


 


 


 Diltiazem HCl 125


 mg/Sodium Chloride  125 ml @ 5


 mls/hr  TITRATE  PRN


 IV  18 11:00


     


 


 


 Potassium


 Chloride/Dextrose/


 Sod Cl  1,000 ml @ 


 80 mls/hr  [id number]


 IV  18 13:45


 18 02:14   


 





.


Family History


Unobtainable.


.


Substance Use


Tobacco: Unknown.


Alcohol: History of alcohol abuse.


Prescription med abuse: No known history.


Illicits: No known history.


.


Psychosocial History


She was born in [place], [place] where she finished high school and attended 

nursing school.  She worked as the charge operating room registered nurse for 

many years.  She was  and had 2 daughters and  many years ago.  

Daughter is .  She has a history of alcohol abuse and required 

admission for detoxification in [place] prior to her daughter moving her to 

[place] where the remainder of the family lived.


.


Spiritual/Cultural Factors


She is a nonpracticing [employer].


.


Living Will:  Copy in medical record


Health Care Surrogate:  Copy in medical record


Durable Power of :  Copy in medical record


Date completed:


2014.


.


Health Care Surrogate(s):


Son-in-law, Prashanth Sidhu is named as the healthcare surrogate in the 2014 

DURABLE POWER OF  to include designation of healthcare surrogate with 

her daughter, Vanessa as the alternate.  The Living Will dated 2014 

designates the daughter Vanessa as the healthcare surrogate.  In my discussion 

with the daughter, Vanessa, she states that her , Prashanth, is the 

healthcare surrogate.


.


Documented care wishes:


Living will on chart with standard verbiage.


.


Today's verbally stated goals:


Patient has dementia and is unable to express her goals.  


.


Family/friends goals:


Family wants full aggressive care to include intubation, cardiac resuscitation 

and rehabilitation.


.


Ethical and Legal Issues


None known.


.





Physical Exam





Vital Signs








  Date Time  Temp Pulse Resp B/P (MAP) Pulse Ox O2 Delivery O2 Flow Rate FiO2


 


18 10:45  77  97/52    


 


18 10:00  88      


 


18 08:00  97      


 


18 08:00 97.9 97 12 116/58 (77) 95   


 


18 07:34     97 Nasal Cannula 4.00 


 


18 07:30     98 Nasal Cannula 3.00 


 


18 06:00  86      


 


18 04:00  93      


 


18 04:00 97.9 93 18 114/56 (75) 97   


 


18 02:00  84      


 


18 00:00  99      


 


18 00:00 98.0 99 17 90/62 (71) 96   


 


18 22:32  85  136/66    


 


18 22:00  80      


 


18 20:42     96 Nasal Cannula 4.00 


 


18 20:00  87      


 


18 20:00 98.1 87 15 131/74 (93) 100   


 


18 19:00     98 Nasal Cannula 3.00 


 


18 18:00  102      


 


18 16:00 98.0 106 21 142/68 (92) 92   


 


18 16:00  104      





.











 18





 18:59 06:59


 


Intake Total 35 ml 


 


Balance 35 ml 


 


  


 


IV Total 35 ml 





.


Exam


CONSTITUTIONAL/GENERAL: This is an adequately nourished patient, in mild 

distress.


TUBES/LINES/DRAINS: Left forearm PIV, Ritter.


SKIN: No jaundice, rashes, or lesions. Ecchymoses on upper extremities.  Skin 

temperature appropriate. Not diaphoretic. 


HEAD: Atraumatic. Normocephalic.


EYES: Pupils equal and round and reactive. Extraocular motions intact. No 

scleral icterus. No injection or drainage. Fundi not examined.


ENT: Hearing grossly normal. Nose without bleeding or purulent drainage. Throat 

without visible erythema, exudates, masses, or lesions.


NECK: Trachea midline. Supple, nontender. No palpable thyroid enlargement or 

nodularity. 


CARDIOVASCULAR: Irregular rhythm, controlled rate, left lower extremity 

swelling without pitting, Doppler pulses.


RESPIRATORY/CHEST: Breath sounds coarse, diminished.


GASTROINTESTINAL: Abdomen soft, non-tender, nondistended. No hepato-splenomegaly

, or palpable masses. No guarding. Bowel sounds present.


GENITOURINARY: Without palpable bladder distension. Ritter catheter in place.


MUSCULOSKELETAL: Extremities without clubbing, cyanosis, or edema.  Left leg in 

surgical dressings.


LYMPHATICS: No palpable cervical or supraclavicular adenopathy.


NEUROLOGICAL: Awake and alert.  Not oriented.  Motor and sensory grossly within 

normal limits.  Does not follow commands. Moves all extremities.


PSYCHIATRIC: Confused.  Mildly agitated, trying to get out of bed.


.





Diagnostic Tests


Laboratory





Laboratory Tests








Test


  18


04:37 18


12:30 18


03:30 18


16:50


 


White Blood Count


  10.4 TH/MM3


(4.0-11.0) 


  9.1 TH/MM3


(4.0-11.0) 


 


 


Red Blood Count


  2.92 MIL/MM3


(4.00-5.30) 


  3.13 MIL/MM3


(4.00-5.30) 


 


 


Hemoglobin


  8.2 GM/DL


(11.6-15.3) 


  8.7 GM/DL


(11.6-15.3) 


 


 


Hematocrit


  24.4 %


(35.0-46.0) 


  26.0 %


(35.0-46.0) 


 


 


Mean Corpuscular Volume


  83.5 FL


(80.0-100.0) 


  83.2 FL


(80.0-100.0) 


 


 


Mean Corpuscular Hemoglobin


  28.0 PG


(27.0-34.0) 


  27.7 PG


(27.0-34.0) 


 


 


Mean Corpuscular Hemoglobin


Concent 33.5 %


(32.0-36.0) 


  33.3 %


(32.0-36.0) 


 


 


Red Cell Distribution Width


  16.9 %


(11.6-17.2) 


  16.8 %


(11.6-17.2) 


 


 


Platelet Count


  283 TH/MM3


(150-450) 


  271 TH/MM3


(150-450) 


 


 


Mean Platelet Volume


  9.9 FL


(7.0-11.0) 


  9.5 FL


(7.0-11.0) 


 


 


Neutrophils (%) (Auto)


  75.9 %


(16.0-70.0) 


  76.6 %


(16.0-70.0) 


 


 


Lymphocytes (%) (Auto)


  12.9 %


(9.0-44.0) 


  12.3 %


(9.0-44.0) 


 


 


Monocytes (%) (Auto)


  9.2 %


(0.0-8.0) 


  8.7 %


(0.0-8.0) 


 


 


Eosinophils (%) (Auto)


  0.9 %


(0.0-4.0) 


  1.3 %


(0.0-4.0) 


 


 


Basophils (%) (Auto)


  1.1 %


(0.0-2.0) 


  1.1 %


(0.0-2.0) 


 


 


Neutrophils # (Auto)


  7.9 TH/MM3


(1.8-7.7) 


  7.0 TH/MM3


(1.8-7.7) 


 


 


Lymphocytes # (Auto)


  1.3 TH/MM3


(1.0-4.8) 


  1.1 TH/MM3


(1.0-4.8) 


 


 


Monocytes # (Auto)


  1.0 TH/MM3


(0-0.9) 


  0.8 TH/MM3


(0-0.9) 


 


 


Eosinophils # (Auto)


  0.1 TH/MM3


(0-0.4) 


  0.1 TH/MM3


(0-0.4) 


 


 


Basophils # (Auto)


  0.1 TH/MM3


(0-0.2) 


  0.1 TH/MM3


(0-0.2) 


 


 


CBC Comment DIFF FINAL   DIFF FINAL  


 


Differential Comment      


 


Blood Urea Nitrogen


  11 MG/DL


(7-18) 


  9 MG/DL (7-18) 


  


 


 


Creatinine


  0.67 MG/DL


(0.50-1.00) 


  0.37 MG/DL


(0.50-1.00) 


 


 


Random Glucose


  88 MG/DL


() 


  79 MG/DL


() 


 


 


Calcium Level


  8.2 MG/DL


(8.5-10.1) 


  8.2 MG/DL


(8.5-10.1) 


 


 


Sodium Level


  138 MEQ/L


(136-145) 


  138 MEQ/L


(136-145) 


 


 


Potassium Level


  3.8 MEQ/L


(3.5-5.1) 


  3.6 MEQ/L


(3.5-5.1) 


 


 


Chloride Level


  105 MEQ/L


() 


  104 MEQ/L


() 


 


 


Carbon Dioxide Level


  26.0 MEQ/L


(21.0-32.0) 


  25.7 MEQ/L


(21.0-32.0) 


 


 


Anion Gap 7 MEQ/L (5-15)   8 MEQ/L (5-15)  


 


Estimat Glomerular Filtration


Rate 84 ML/MIN


(>89) 


  167 ML/MIN


(>89) 


 


 


Urine Color


  


  YELLOW


(YELLW/STRAW) 


  


 


 


Urine Turbidity  HAZY (CLEAR)   


 


Urine pH  5.5 (5.0-8.5)   


 


Urine Specific Gravity


  


  1.024


(1.002-1.035) 


  


 


 


Urine Protein


  


  30 mg/dL


(NEG-TRACE) 


  


 


 


Urine Glucose (UA)


  


  NEG mg/dL


(NEG) 


  


 


 


Urine Ketones  40 mg/dL (NEG)   


 


Urine Occult Blood  MOD (NEG)   


 


Urine Nitrite  NEG (NEG)   


 


Urine Bilirubin  NEG (NEG)   


 


Urine Urobilinogen


  


  LESS THAN 2.0


MG/DL (LESS 


  


 


 


Urine Leukocyte Esterase  MOD (NEG)   


 


Urine RBC   /hpf (0-3)   


 


Urine WBC  24 /hpf (0-5)   


 


Urine Squamous Epithelial


Cells 


  <1 /hpf (0-5) 


  


  


 


 


Urine Transitional Epithelial


Cells 


  <1 /hpf (NONE) 


  


  


 


 


Urine Bacteria


  


  RARE /hpf


(NONE) 


  


 


 


Urine Mucus  FEW /lpf (OCC)   


 


Microscopic Urinalysis Comment


  


  CATH-CULTURE


IND 


  


 


 


Nasal Screen MRSA (PCR)


  


  


  


  MRSA NOT


DETECTED (NOT


 


Test


  18


09:39 


  


  


 


 


White Blood Count


  9.1 TH/MM3


(4.0-11.0) 


  


  


 


 


Red Blood Count


  3.46 MIL/MM3


(4.00-5.30) 


  


  


 


 


Hemoglobin


  9.4 GM/DL


(11.6-15.3) 


  


  


 


 


Hematocrit


  28.8 %


(35.0-46.0) 


  


  


 


 


Mean Corpuscular Volume


  83.1 FL


(80.0-100.0) 


  


  


 


 


Mean Corpuscular Hemoglobin


  27.3 PG


(27.0-34.0) 


  


  


 


 


Mean Corpuscular Hemoglobin


Concent 32.8 %


(32.0-36.0) 


  


  


 


 


Red Cell Distribution Width


  16.8 %


(11.6-17.2) 


  


  


 


 


Platelet Count


  407 TH/MM3


(150-450) 


  


  


 


 


Mean Platelet Volume


  9.0 FL


(7.0-11.0) 


  


  


 


 


Blood Urea Nitrogen 5 MG/DL (7-18)    


 


Creatinine


  0.50 MG/DL


(0.50-1.00) 


  


  


 


 


Random Glucose


  115 MG/DL


() 


  


  


 


 


Calcium Level


  8.6 MG/DL


(8.5-10.1) 


  


  


 


 


Sodium Level


  138 MEQ/L


(136-145) 


  


  


 


 


Potassium Level


  3.3 MEQ/L


(3.5-5.1) 


  


  


 


 


Chloride Level


  103 MEQ/L


() 


  


  


 


 


Carbon Dioxide Level


  28.8 MEQ/L


(21.0-32.0) 


  


  


 


 


Anion Gap 6 MEQ/L (5-15)    


 


Estimat Glomerular Filtration


Rate 118 ML/MIN


(>89) 


  


  


 





.


Result Diagram:  


18 0939                                                                   

             18 0939





Microbiology





Microbiology








 Date/Time


Source Procedure


Growth Status


 


 


 18 12:30


Urine Clean Catch Urine Culture - Final


NO GROWTH IN 48 HOURS. Complete








Imaging





Last Impressions








Chest X-Ray 18 0000 Signed





Impressions: 





 Service Date/Time:  2018 11:29 - CONCLUSION:  





 Interstitial pulmonary edema. This appears to be increased compared to the 

prior 





 exam.     Petr Salgado MD 


 


Tibia/Fibula X-Ray 18 0000 Signed





Impressions: 





 Service Date/Time:  2018 09:57 - CONCLUSION:  1. Status 





 post left tibial IM braydon fixation, as above.     Bennie Barger MD 


 


Pelvis X-Ray 18 0000 Signed





Impressions: 





 Service Date/Time:   10:21 - CONCLUSION:  The bony 





 pelvic ring is grossly intact.  Symmetric appearance of bilateral total hip 





 arthroplasty.     Dylan Candelaria MD 


 


Femur X-Ray 18 0000 Signed





Impressions: 





 Service Date/Time:   10:23 - CONCLUSION:  1. No 





 fracture seen. 2. Possible knee effusion.     Dylan Candelaria MD 





.


Procedures


Reduction and intramedullary nail fixation of the left tibia


.





Patient/Family Conference


Present at Family Conference:


Spoke with daughterVanessa and granddaughter, camera, bedside regarding patient'

s clinical condition, progress and discharge plans.  Reviewed the items below.  

Discussed CODE STATUS and potential sequelae of interventions.  Family is 

adamant that they want all resuscitative and rehabilitative measures pursued.


.


Family Conference Time (mins):  45


Family Conference Location:  Bedside


Issues Discussed:


* Palliative care role, purpose, approach


* Additional medical, psychosocial, and spiritual history


* Patients general health, functional status, and cognitive changes in the 

months leading up to the current hospitalization


* Patient/family understanding of the current medical problems


* Patient/family understanding of prognosis


* Patients goals of care as best understood from advance directives and/or 

conversations and/or values


* Current medical treatment options and benefits/burdens of those options


* Likely scenarios comparing ongoing aggressive care with a transition to 

comfort measures only


* Questions answered to the best of my ability


* Palliative care contact information provided





Assessment and Plan


Disease Oriented Problem List:  


(1) Fracture, tibia and fibula, shaft


(2) Agitation


(3) Dementia


(4) Atrial fibrillation with RVR


Symptom Scale:  


(1) Confusion


(2) Agitation


Pertinent Non-Medical Issues


Psychosocial:She was born in [place], [place] where she finished high school and 

attended nursing school.  She worked as the charge operating room registered 

nurse for many years.  She was  and had 2 daughters and  many 

years ago.  Daughter is .  She has a history of alcohol abuse and 

required admission for detoxification in [place] prior to her daughter moving 

her to [place] where the remainder of the family lived.


.


Spiritual: Nonpracticing [employer].


.


Legal: Son-in-law, Lee Sidhu and daughter Vanessa Sidhu are listed as healthcare 

surrogate and alternate.


.


Ethical issues impacting care: None noted.


.


Important Contacts


Son-in-law, Lee Sidhu: [phone]


Daughter: Vanessa Sidhu [phone]


Granddaughter: Ruma: [phone]


Grandson: Reji [phone]


.


Prognosis


Her prognosis is guarded.  While she will likely recover from the fracture, she 

remains in atrial fibrillation.  She is a relatively high fall risk if in her 

decreased mobility, agitation, confusion and history of a recent fall resulting 

in the fracture.  While anticoagulation would be recommended for thromboembolic 

prophylaxis, it also places her at an elevated risk of injury and bleeding in 

case of fall.  This is under discussion by the consulting physicians who will 

determine the risk benefit profile.  She does have significant dementia and is 

likely to continue to decline with resultant complications and recurrent 

hospitalizations.


.


Code Status:  Full Code


Plan


PLAN:


   Legal decision maker: Patient is not capacitated for decision-making.  Son-in

-law, Prashanth Sidhu, is reportedly the healthcare power of .  DPOA names 

him as the healthcare surrogate.  Living Will and separate healthcare surrogate 

name her daughter, Vanessa Sidhu as the healthcare surrogate.  All documents are 

dated 2014.  Daughter, Vanessa, indicates that her  is the healthcare 

surrogate record, however the family is working together harmoniously together 

with consistent goals.





   Goals: Aggressive.





   CODE STATUS: FULL CODE





SYMPTOMS:


* Confusion: Patient at baseline has severe dementia.  She is intermittently 

able to answer simple questions, i.e., are you in pain?,  Are you cold?  She is 

intermittently able to make some of her needs known.  She will require close 

clinical monitoring to manage postoperative discomfort due to her baseline 

dementia.





* Agitation: She remains mildly agitated, attempting to get out of bed, 

requiring restraints.  She is unable to follow directions and would likely be a 

poor rehabilitation candidate due to her inability to comprehend.  She is at 

risk for further falls due to her inability to comprehend her safety risk.





SUMMARY


This is an 82-year-old  female resident of an [facility] with severe dementia

, progressive weakness and falls.  She is wheelchair-bound and sustained a 

fracture during transfer.  She is at risk for further decline due to immobility

, confusion, inability to make her needs known.  Her FAST score is 7C, as her 

speech ability is limited and she has lost her ambulatory ability.  She remains 

able to sit up without assistance.  Family's goals remain unrealistically 

aggressive, requesting rehabilitation, which the patient is highly unlikely to 

cooperate with.  Family states she had not cooperated with rehabilitation 

several years ago, but still wish to pursue that goal.  She has severe dementia 

and would be hospice appropriate if goals were consistent.





Palliative care will continue to follow the patient during hospital course as 

condition evolves, to assist patient/decision-maker with understanding of their 

medical conditions, weighing benefits/burdens of treatment options, for 

clarification of goals of treatment.  Additionally will assist with any 

symptoms of palliative concern.


.





Thank you for the opportunity to participate in the care of Ms. Alvarenga.





Attestation


To help prompt me to consider important information that might be impacting 

today's encounter and assessment, information from prior notes written by 

myself or my colleagues may have been "brought forward" into today's note.  My 

signature on this note, however, is an attestation that I personally performed 

the exam, history, and/or decision-making noted today, and, unless otherwise 

indicated, the interactions with patient, family, and staff as well as the 

review of records all occurred today.  I also attest that the listed assessment 

and stated plan reflect my best clinical judgment today based on the 

combination of historical information, prior notes, and today's exam/ 

interactions.  When time spent is documented, it refers only to time spent 

today by the signer, or if indicated, combined time spent today by 

collaborating physician/nurse practitioner.


.











Francy Peña 2018 3:03 pm

## 2018-02-27 VITALS
TEMPERATURE: 98 F | SYSTOLIC BLOOD PRESSURE: 128 MMHG | OXYGEN SATURATION: 94 % | HEART RATE: 107 BPM | DIASTOLIC BLOOD PRESSURE: 81 MMHG | RESPIRATION RATE: 19 BRPM

## 2018-02-27 VITALS
HEART RATE: 120 BPM | DIASTOLIC BLOOD PRESSURE: 81 MMHG | OXYGEN SATURATION: 93 % | RESPIRATION RATE: 19 BRPM | SYSTOLIC BLOOD PRESSURE: 138 MMHG | TEMPERATURE: 98.2 F

## 2018-02-27 VITALS
TEMPERATURE: 97.4 F | HEART RATE: 101 BPM | RESPIRATION RATE: 20 BRPM | OXYGEN SATURATION: 95 % | DIASTOLIC BLOOD PRESSURE: 87 MMHG | SYSTOLIC BLOOD PRESSURE: 135 MMHG

## 2018-02-27 VITALS — HEART RATE: 111 BPM

## 2018-02-27 VITALS
DIASTOLIC BLOOD PRESSURE: 72 MMHG | RESPIRATION RATE: 20 BRPM | SYSTOLIC BLOOD PRESSURE: 125 MMHG | OXYGEN SATURATION: 93 % | TEMPERATURE: 98.1 F | HEART RATE: 124 BPM

## 2018-02-27 VITALS
SYSTOLIC BLOOD PRESSURE: 133 MMHG | DIASTOLIC BLOOD PRESSURE: 91 MMHG | OXYGEN SATURATION: 95 % | RESPIRATION RATE: 24 BRPM | TEMPERATURE: 98.2 F | HEART RATE: 116 BPM

## 2018-02-27 VITALS
SYSTOLIC BLOOD PRESSURE: 129 MMHG | OXYGEN SATURATION: 97 % | RESPIRATION RATE: 18 BRPM | HEART RATE: 128 BPM | DIASTOLIC BLOOD PRESSURE: 61 MMHG | TEMPERATURE: 97.6 F

## 2018-02-27 VITALS — HEART RATE: 119 BPM

## 2018-02-27 VITALS — OXYGEN SATURATION: 93 %

## 2018-02-27 VITALS — HEART RATE: 94 BPM

## 2018-02-27 VITALS — HEART RATE: 137 BPM

## 2018-02-27 VITALS — HEART RATE: 127 BPM

## 2018-02-27 LAB
BASOPHILS # BLD AUTO: 0.1 TH/MM3 (ref 0–0.2)
BASOPHILS NFR BLD: 0.9 % (ref 0–2)
BUN SERPL-MCNC: 4 MG/DL (ref 7–18)
CALCIUM SERPL-MCNC: 8.4 MG/DL (ref 8.5–10.1)
CHLORIDE SERPL-SCNC: 107 MEQ/L (ref 98–107)
CREAT SERPL-MCNC: 0.4 MG/DL (ref 0.5–1)
EOSINOPHIL # BLD: 0.2 TH/MM3 (ref 0–0.4)
EOSINOPHIL NFR BLD: 2.8 % (ref 0–4)
ERYTHROCYTE [DISTWIDTH] IN BLOOD BY AUTOMATED COUNT: 16.8 % (ref 11.6–17.2)
GFR SERPLBLD BASED ON 1.73 SQ M-ARVRAT: 153 ML/MIN (ref 89–?)
GLUCOSE SERPL-MCNC: 112 MG/DL (ref 74–106)
HCO3 BLD-SCNC: 25 MEQ/L (ref 21–32)
HCT VFR BLD CALC: 27.7 % (ref 35–46)
HGB BLD-MCNC: 9.2 GM/DL (ref 11.6–15.3)
LYMPHOCYTES # BLD AUTO: 1.1 TH/MM3 (ref 1–4.8)
LYMPHOCYTES NFR BLD AUTO: 13.6 % (ref 9–44)
MCH RBC QN AUTO: 27.3 PG (ref 27–34)
MCHC RBC AUTO-ENTMCNC: 33.3 % (ref 32–36)
MCV RBC AUTO: 82 FL (ref 80–100)
MONOCYTE #: 0.6 TH/MM3 (ref 0–0.9)
MONOCYTES NFR BLD: 7.7 % (ref 0–8)
NEUTROPHILS # BLD AUTO: 6 TH/MM3 (ref 1.8–7.7)
NEUTROPHILS NFR BLD AUTO: 75 % (ref 16–70)
PLATELET # BLD: 424 TH/MM3 (ref 150–450)
PMV BLD AUTO: 8.9 FL (ref 7–11)
RBC # BLD AUTO: 3.38 MIL/MM3 (ref 4–5.3)
SODIUM SERPL-SCNC: 139 MEQ/L (ref 136–145)
WBC # BLD AUTO: 8 TH/MM3 (ref 4–11)

## 2018-02-27 RX ADMIN — DILTIAZEM HYDROCHLORIDE SCH MG: 30 TABLET, FILM COATED ORAL at 18:00

## 2018-02-27 RX ADMIN — MORPHINE SULFATE SCH MG: 2 INJECTION, SOLUTION INTRAMUSCULAR; INTRAVENOUS at 16:00

## 2018-02-27 RX ADMIN — STANDARDIZED SENNA CONCENTRATE AND DOCUSATE SODIUM SCH TAB: 8.6; 5 TABLET, FILM COATED ORAL at 21:47

## 2018-02-27 RX ADMIN — Medication SCH ML: at 21:00

## 2018-02-27 RX ADMIN — DILTIAZEM HYDROCHLORIDE SCH MG: 30 TABLET, FILM COATED ORAL at 05:08

## 2018-02-27 RX ADMIN — CALCIUM CARBONATE-CHOLECALCIFEROL TAB 250 MG-125 UNIT SCH MG: 250-125 TAB at 18:00

## 2018-02-27 RX ADMIN — DILTIAZEM HYDROCHLORIDE SCH MG: 30 TABLET, FILM COATED ORAL at 13:27

## 2018-02-27 RX ADMIN — MAGNESIUM SULFATE HEPTAHYDRATE SCH MLS/HR: 500 INJECTION, SOLUTION INTRAMUSCULAR; INTRAVENOUS at 13:28

## 2018-02-27 RX ADMIN — MORPHINE SULFATE SCH MG: 2 INJECTION, SOLUTION INTRAMUSCULAR; INTRAVENOUS at 20:00

## 2018-02-27 RX ADMIN — VITAMIN D, TAB 1000IU (100/BT) SCH UNITS: 25 TAB at 08:18

## 2018-02-27 RX ADMIN — CALCIUM CARBONATE-CHOLECALCIFEROL TAB 250 MG-125 UNIT SCH MG: 250-125 TAB at 13:27

## 2018-02-27 RX ADMIN — CALCIUM CARBONATE-CHOLECALCIFEROL TAB 250 MG-125 UNIT SCH MG: 250-125 TAB at 08:19

## 2018-02-27 RX ADMIN — OXYCODONE HYDROCHLORIDE AND ACETAMINOPHEN SCH MG: 500 TABLET ORAL at 08:18

## 2018-02-27 RX ADMIN — ENOXAPARIN SODIUM SCH MG: 30 INJECTION SUBCUTANEOUS at 13:27

## 2018-02-27 RX ADMIN — STANDARDIZED SENNA CONCENTRATE AND DOCUSATE SODIUM SCH TAB: 8.6; 5 TABLET, FILM COATED ORAL at 08:19

## 2018-02-27 RX ADMIN — Medication SCH ML: at 08:18

## 2018-02-27 RX ADMIN — MORPHINE SULFATE PRN MG: 2 INJECTION, SOLUTION INTRAMUSCULAR; INTRAVENOUS at 15:05

## 2018-02-27 NOTE — PD.CARD.PN
Subjective


Subjective Remarks


awake in nad





Objective


Medications





Current Medications








 Medications


  (Trade)  Dose


 Ordered  Sig/César


 Route  Start Time


 Stop Time Status Last Admin


 


  (NS Flush)  2 ml  UNSCH  PRN


 IV FLUSH  2/22/18 13:15


     


 


 


  (NS Flush)  2 ml  BID


 IV FLUSH  2/22/18 21:00


    2/26/18 21:30


 


 


  (Tylenol)  650 mg  Q4H  PRN


 PO  2/22/18 13:15


     


 


 


  (Narcan Inj)  0.4 mg  UNSCH  PRN


 IV PUSH  2/22/18 13:15


     


 


 


  (Halina-Colace)  1 tab  BID


 PO  2/22/18 21:00


    2/27/18 08:19


 


 


  (Milk Of


 Magnesia Liq)  30 ml  Q12H  PRN


 PO  2/22/18 13:15


     


 


 


  (Senokot)  17.2 mg  Q12H  PRN


 PO  2/22/18 13:15


     


 


 


  (Dulcolax Supp)  10 mg  DAILY  PRN


 RECTAL  2/22/18 13:15


     


 


 


  (Lactulose Liq)  30 ml  DAILY  PRN


 PO  2/22/18 13:15


     


 


 


  (Morphine Inj)  2 mg  Q3H  PRN


 IV PUSH  2/22/18 13:15


    2/24/18 10:28


 


 


  (Lovenox Inj)  30 mg  Q24H


 SQ  2/24/18 09:30


    2/26/18 09:34


 


 


  (Norco  7.5-325


 Mg)  1 tab  Q3H  PRN


 PO  2/23/18 10:15


     


 


 


  (Oscal-D 250-125)  250 mg  TID


 PO  2/23/18 13:00


    2/27/18 08:19


 


 


  (Benadryl)  25 mg  Q6H  PRN


 PO  2/23/18 10:15


     


 


 


  (Drisdol)  50,000 units  Q7D


 PO  2/23/18 11:00


     


 


 


  (Vitamin D3)  1,000 units  DAILY


 PO  2/24/18 09:00


    2/27/18 08:18


 


 


  (Vitamin C)  1,000 mg  DAILY


 PO  2/24/18 09:00


    2/27/18 08:18


 


 


  (Morphine Inj)  4 mg  Q3H  PRN


 IV PUSH  2/24/18 13:45


    2/26/18 11:52


 


 


  (Haldol Inj)  0.5 mg  Q6HR  PRN


 IV PUSH  2/24/18 12:15


    2/24/18 20:12


 


 


 Ceftriaxone


 Sodium 1000 mg/


 Sodium Chloride  100 ml @ 


 200 mls/hr  Q24H


 IV  2/24/18 20:00


    2/26/18 21:28


 


 


 Dextrose/Sodium


 Chloride  1,000 ml @ 


 80 mls/hr  [id number]


 IV  2/25/18 13:00


   Future hold 2/26/18 02:52


 


 


  (Cardizem)  30 mg  Q6HR


 PO  2/26/18 12:00


    2/27/18 05:08


 


 


 Diltiazem HCl 125


 mg/Sodium Chloride  125 ml @ 5


 mls/hr  TITRATE  PRN


 IV  2/26/18 11:00


     


 








Vital Signs / I&O





Vital Signs








  Date Time  Temp Pulse Resp B/P (MAP) Pulse Ox O2 Delivery O2 Flow Rate FiO2


 


2/27/18 12:13     93 Nasal Cannula 3.00 


 


2/27/18 08:00      Nasal Cannula 3.00 


 


2/27/18 08:00 98.2 116 24 133/91 (105) 95   


 


2/27/18 04:10  119      


 


2/27/18 04:00 98.2 120 19 138/81 (100) 93   


 


2/27/18 00:00 98.1 124 20 125/72 (89) 93   


 


2/26/18 23:53  97      


 


2/26/18 21:48  136      


 


2/26/18 20:00 98.8 140 20 118/73 (88) 94   


 


2/26/18 19:45      Nasal Cannula 3.00 


 


2/26/18 18:39 98.1 98 18 137/70 (92) 91   


 


2/26/18 16:00  119      


 


2/26/18 16:00 98.1 119 18 126/69 (88) 95   


 


2/26/18 14:00  98      














I/O      


 


 2/26/18 2/26/18 2/26/18 2/27/18 2/27/18 2/27/18





 07:00 15:00 23:00 07:00 15:00 23:00


 


Intake Total 1000 ml 35 ml 100 ml 718 ml  


 


Output Total 800 ml   1200 ml  


 


Balance 200 ml 35 ml 100 ml -482 ml  


 


      


 


Intake Oral    200 ml  


 


IV Total 1000 ml 35 ml 100 ml 518 ml  


 


Output Urine Total 800 ml   1200 ml  


 


# Bowel Movements 0     








Physical Exam


GENERAL: 


SKIN: Warm and dry.


HEAD: Normocephalic.


EYES: No scleral icterus. No injection or drainage. 


NECK: Supple, trachea midline. No JVD or lymphadenopathy.


CARDIOVASCULAR: Regular rate and rhythm without murmurs, gallops, or rubs. 


RESPIRATORY: Breath sounds equal bilaterally. No accessory muscle use.


GASTROINTESTINAL: Abdomen soft, non-tender, nondistended. 


MUSCULOSKELETAL: No cyanosis, or edema. 


BACK: Nontender without obvious deformity. No CVA tenderness.





Laboratory





Laboratory Tests








Test


  2/27/18


06:30


 


White Blood Count 8.0 TH/MM3 


 


Red Blood Count 3.38 MIL/MM3 


 


Hemoglobin 9.2 GM/DL 


 


Hematocrit 27.7 % 


 


Mean Corpuscular Volume 82.0 FL 


 


Mean Corpuscular Hemoglobin 27.3 PG 


 


Mean Corpuscular Hemoglobin


Concent 33.3 % 


 


 


Red Cell Distribution Width 16.8 % 


 


Platelet Count 424 TH/MM3 


 


Mean Platelet Volume 8.9 FL 


 


Neutrophils (%) (Auto) 75.0 % 


 


Lymphocytes (%) (Auto) 13.6 % 


 


Monocytes (%) (Auto) 7.7 % 


 


Eosinophils (%) (Auto) 2.8 % 


 


Basophils (%) (Auto) 0.9 % 


 


Neutrophils # (Auto) 6.0 TH/MM3 


 


Lymphocytes # (Auto) 1.1 TH/MM3 


 


Monocytes # (Auto) 0.6 TH/MM3 


 


Eosinophils # (Auto) 0.2 TH/MM3 


 


Basophils # (Auto) 0.1 TH/MM3 


 


CBC Comment DIFF FINAL 


 


Differential Comment  


 


Blood Urea Nitrogen 4 MG/DL 


 


Creatinine 0.40 MG/DL 


 


Random Glucose 112 MG/DL 


 


Calcium Level 8.4 MG/DL 


 


Sodium Level 139 MEQ/L 


 


Potassium Level 3.5 MEQ/L 


 


Chloride Level 107 MEQ/L 


 


Carbon Dioxide Level 25.0 MEQ/L 


 


Anion Gap 7 MEQ/L 


 


Estimat Glomerular Filtration


Rate 153 ML/MIN 


 











Assessment and Plan


Problem List:  


(1) New onset a-fib


ICD Codes:  I48.91 - Unspecified atrial fibrillation


Status:  Acute


(2) Atrial fibrillation with RVR


ICD Codes:  I48.91 - Unspecified atrial fibrillation


Status:  Acute


(3) Dementia


ICD Codes:  F03.90 - Unspecified dementia without behavioral disturbance


Status:  Chronic


(4) Fracture, tibia and fibula, shaft


ICD Codes:  S82.209A - Unspecified fracture of shaft of unspecified tibia, 

initial encounter for closed fracture; S82.409A - Unspecified fracture of shaft 

of unspecified fibula, initial encounter for closed fracture


Status:  Acute


Assessment and Plan


1.) Afib- rate controlled, pod # 4, ortho surgery, explained to daughter and 

son coumadin or noac indicated due to [id number] socre = 3, however bleeding 

and complication risk would be high due to fall risk, dementia and difficulty 

with compliance; they refuse coumadin or noac, request aspirin 81 mg qd 

understanding cva risk will be higher





Problem Qualifiers





(1) Dementia:  


Qualified Codes:  F03.90 - Unspecified dementia without behavioral disturbance


(2) Fracture, tibia and fibula, shaft:  


Qualified Codes:  S82.202A - Unspecified fracture of shaft of left tibia, 

initial encounter for closed fracture; S82.402A - Unspecified fracture of shaft 

of left fibula, initial encounter for closed fracture








Jayden Lama MD Feb 27, 2018 12:59

## 2018-02-27 NOTE — PD.ORT.PN
Subjective


Subjective Remarks


Patient is confused in restraints





Objective


Vitals





Vital Signs








  Date Time  Temp Pulse Resp B/P (MAP) Pulse Ox O2 Delivery O2 Flow Rate FiO2


 


2/27/18 04:10  119      


 


2/27/18 04:00 98.2 120 19 138/81 (100) 93   


 


2/27/18 00:00 98.1 124 20 125/72 (89) 93   


 


2/26/18 23:53  97      


 


2/26/18 21:48  136      


 


2/26/18 20:00 98.8 140 20 118/73 (88) 94   


 


2/26/18 19:45      Nasal Cannula 3.00 


 


2/26/18 18:39 98.1 98 18 137/70 (92) 91   


 


2/26/18 16:00  119      


 


2/26/18 16:00 98.1 119 18 126/69 (88) 95   


 


2/26/18 14:00  98      


 


2/26/18 12:00 98.7 103 17 98/57 (71) 95   


 


2/26/18 12:00  103      


 


2/26/18 10:45  77  97/52    


 


2/26/18 10:00  88      


 


2/26/18 08:00  97      


 


2/26/18 08:00 97.9 97 12 116/58 (77) 95   


 


2/26/18 07:34     97 Nasal Cannula 4.00 


 


2/26/18 07:30     98 Nasal Cannula 3.00 














I/O      


 


 2/26/18 2/26/18 2/26/18 2/27/18 2/27/18 2/27/18





 07:00 15:00 23:00 07:00 15:00 23:00


 


Intake Total 1000 ml 35 ml 100 ml 718 ml  


 


Output Total 800 ml   1200 ml  


 


Balance 200 ml 35 ml 100 ml -482 ml  


 


      


 


Intake Oral    200 ml  


 


IV Total 1000 ml 35 ml 100 ml 518 ml  


 


Output Urine Total 800 ml   1200 ml  


 


# Bowel Movements 0     








Result Diagram:  


2/26/18 0939                                                                   

             2/26/18 0939





Imaging





Last 24 hours Impressions








Chest X-Ray 2/22/18 1006 Signed





Impressions: 





 Service Date/Time:  Thursday, February 22, 2018 10:40 - CONCLUSION:  1. No 





 infiltrates seen. 2. Cardiomegaly and fullness of the central bronchopulmonary 





 markings suggest pulmonary venous hypertension.     Dylan Candelaria MD 








Procedures


Left Distal 1/3 Tibia and fibula shaft fxs s/p IMN by Dr Velásquez on 2/23/18


Objective Remarks


LLE: dressings clean and dry. intact. nvi, stable capillary refill, 1+ pedal 

edema, no change.





Assessment & Plan


Problem List:  


(1) Fracture, tibia and fibula, shaft


ICD Codes:  S82.209A - Unspecified fracture of shaft of unspecified tibia, 

initial encounter for closed fracture; S82.409A - Unspecified fracture of shaft 

of unspecified fibula, initial encounter for closed fracture


Status:  Acute


Qualifiers:  


   Qualified Codes:  S82.202A - Unspecified fracture of shaft of left tibia, 

initial encounter for closed fracture; S82.402A - Unspecified fracture of shaft 

of left fibula, initial encounter for closed fracture


Assessment and Plan


1) Left Distal 1/3 Tibia and fibula shaft fxs s/p IMN by Dr Velásquez on 2/23/18 - 

POD 4


   -NWB LLE


   -daily dressing changes to begin POD 2 with xeroform/4x4/ACE (ensure that 

ace is not too tight). 


   -CM for rehab placement


   -DVT prophylaxis


   -Scripts on chart


   - Discharge planning 


   -f/u with [facility] or [facility] in 2 weeks











Ryan Dumont Jr. Feb 27, 2018 07:20

## 2018-02-27 NOTE — HHI.FPPN
Subjective


Remarks


NAEON, but tachycardia. Has not been given any pain medication since yesterday. 

Has decreased PO inadequate for needs. Saw pt's daughter and grandson today in 

the room and they are concerned about decreased PO to meet metabolic needs and 

have verbally consented to PEG as a way of maintaining adequate nutrition. Pt 

is in restraints due to attempting to get out of bed and high fall risk. Cannot 

respond to questions as she repeats the same words and is pleasantly demented. 


 (Dheeraj Hurd MD R1)





Objective


Vitals





Vital Signs








  Date Time  Temp Pulse Resp B/P (MAP) Pulse Ox O2 Delivery O2 Flow Rate FiO2


 


2/27/18 12:13     93 Nasal Cannula 3.00 


 


2/27/18 08:00      Nasal Cannula 3.00 


 


2/27/18 08:00 98.2 116 24 133/91 (105) 95   


 


2/27/18 04:10  119      


 


2/27/18 04:00 98.2 120 19 138/81 (100) 93   


 


2/27/18 00:00 98.1 124 20 125/72 (89) 93   


 


2/26/18 23:53  97      


 


2/26/18 21:48  136      


 


2/26/18 20:00 98.8 140 20 118/73 (88) 94   


 


2/26/18 19:45      Nasal Cannula 3.00 


 


2/26/18 18:39 98.1 98 18 137/70 (92) 91   


 


2/26/18 16:00  119      


 


2/26/18 16:00 98.1 119 18 126/69 (88) 95   














I/O      


 


 2/26/18 2/26/18 2/26/18 2/27/18 2/27/18 2/27/18





 07:00 15:00 23:00 07:00 15:00 23:00


 


Intake Total 1000 ml 35 ml 100 ml 718 ml  


 


Output Total 800 ml   1200 ml  


 


Balance 200 ml 35 ml 100 ml -482 ml  


 


      


 


Intake Oral    200 ml  


 


IV Total 1000 ml 35 ml 100 ml 518 ml  


 


Output Urine Total 800 ml   1200 ml  


 


# Bowel Movements 0     








 (Dheeraj Hurd MD R1)


Result Diagram:  


2/27/18 0630                                                                   

             2/27/18 0630





Objective Remarks


GENERAL: This is a thin, elderly female lying in bed intermittently mumbling 

words with eyes closed and grasping at her sheets.  No acute distress.  Does 

not respond to questions. Does not respond to questions. Pleasantly demented.


SKIN: No rashes, ecchymoses or lesions. 


HEAD: Atraumatic. Normocephalic. 


EYES:  No scleral icterus. No injection or drainage. 


ENT: Nose without drainage.


NECK: Trachea midline. No JVD or lymphadenopathy.  no meningeal signs.


CARDIOVASCULAR: irregularly irregular rate and rhythm without murmur, gallop, 

or rub. 


RESPIRATORY: Clear to auscultation. Breath sounds equal bilaterally. No wheezes

, rales, or rhonchi.  


GASTROINTESTINAL: Abdomen soft, non-tender, nondistended. No hepato-splenomegaly

, or palpable masses. No guarding.


MUSCULOSKELETAL: Extremities without clubbing, cyanosis, or edema. No edema of 

RLE. LLE in boot and ace bandage to the knee. No right calf tenderness. SCD on 

RLE.


NEUROLOGICAL: Intermittently opens eyes. Not oriented. Motor and sensory 

grossly within normal limits. Cannot cooperate.


Medications and IVs





Current Medications








 Medications


  (Trade)  Dose


 Ordered  Sig/César


 Route  Start Time


 Stop Time Status Last Admin


 


  (NS Flush)  2 ml  UNSCH  PRN


 IV FLUSH  2/22/18 13:15


     


 


 


  (NS Flush)  2 ml  BID


 IV FLUSH  2/22/18 21:00


    2/26/18 21:30


 


 


  (Tylenol)  650 mg  Q4H  PRN


 PO  2/22/18 13:15


     


 


 


  (Narcan Inj)  0.4 mg  UNSCH  PRN


 IV PUSH  2/22/18 13:15


     


 


 


  (Halina-Colace)  1 tab  BID


 PO  2/22/18 21:00


    2/27/18 08:19


 


 


  (Milk Of


 Magnesia Liq)  30 ml  Q12H  PRN


 PO  2/22/18 13:15


     


 


 


  (Senokot)  17.2 mg  Q12H  PRN


 PO  2/22/18 13:15


     


 


 


  (Dulcolax Supp)  10 mg  DAILY  PRN


 RECTAL  2/22/18 13:15


     


 


 


  (Lactulose Liq)  30 ml  DAILY  PRN


 PO  2/22/18 13:15


     


 


 


  (Morphine Inj)  2 mg  Q3H  PRN


 IV PUSH  2/22/18 13:15


    2/24/18 10:28


 


 


  (Lovenox Inj)  30 mg  Q24H


 SQ  2/24/18 09:30


    2/27/18 13:27


 


 


  (Norco  7.5-325


 Mg)  1 tab  Q3H  PRN


 PO  2/23/18 10:15


     


 


 


  (Oscal-D 250-125)  250 mg  TID


 PO  2/23/18 13:00


    2/27/18 13:27


 


 


  (Benadryl)  25 mg  Q6H  PRN


 PO  2/23/18 10:15


     


 


 


  (Drisdol)  50,000 units  Q7D


 PO  2/23/18 11:00


     


 


 


  (Vitamin D3)  1,000 units  DAILY


 PO  2/24/18 09:00


    2/27/18 08:18


 


 


  (Vitamin C)  1,000 mg  DAILY


 PO  2/24/18 09:00


    2/27/18 08:18


 


 


  (Morphine Inj)  4 mg  Q3H  PRN


 IV PUSH  2/24/18 13:45


    2/26/18 11:52


 


 


  (Haldol Inj)  0.5 mg  Q6HR  PRN


 IV PUSH  2/24/18 12:15


    2/24/18 20:12


 


 


 Ceftriaxone


 Sodium 1000 mg/


 Sodium Chloride  100 ml @ 


 200 mls/hr  Q24H


 IV  2/24/18 20:00


    2/26/18 21:28


 


 


 Dextrose/Sodium


 Chloride  1,000 ml @ 


 80 mls/hr  [id number]


 IV  2/25/18 13:00


   Future hold 2/27/18 13:28


 


 


  (Cardizem)  30 mg  Q6HR


 PO  2/26/18 12:00


    2/27/18 13:27


 


 


 Diltiazem HCl 125


 mg/Sodium Chloride  125 ml @ 5


 mls/hr  TITRATE  PRN


 IV  2/26/18 11:00


     


 


 


  (Ecotrin Ec)  81 mg  DAILY


 PO  2/28/18 09:00


     


 








 (Dheeraj Hurd MD R1)


Urinary Catheter:  Yes


Assessment to:  Continue


 (Dheeraj Hurd MD R1)





A/P


Assessment and Plan


83YO female w/PMHx dementia presents from [facility] with comminuted fractures of 

distal 1/3 of left tibia and fibula and new onset atrial fibrillation with RVR. 

She is POD#4 Left Distal 1/3 Tibia and fibula shaft fxs s/p IMN by Dr Velásquez on 

2/23/18; initially started on Diltiazem gtt for afib RVR until rate controlled 

and Dr Lama has switched to Dilt 30mg PO q6h.


Discharge Planning


Pending PEG placement and SNF placement


grandchildren are Enriqueta and Cliff Sidhu 


Prashanth Sidhu is [facility] [phone]


 (Dheeraj Hurd MD R1)


Attending Attestation


Patient seen and examined.  Case reviewed and discussed with the resident team.

  Agree with plan of care as discussed with me and documented in the resident 

note.


poor lady is demented and not clear on what is happening. her family chooses to 

be aggressive. she must be out of restraints to go to a SNF 


 (Jessica Quarles MD)


Problem List:  


(1) Failure to thrive in adult


ICD Codes:  R62.7 - Adult failure to thrive


Plan:  Pt with inadequate PO and concern by family about continued ability to 

be able to take adequate caloric intake. Pt is frail with recent osteoporotic 

fracture of left tibia and fibula from a fall, and pt cannot walk anymore. 

Discussion with daughter today revealed their concern that she is not able to 

take adequate nutrition on her own. Daughter has verbally stated she would 

consent to PEG tube placement to ensure her mother gets adequate caloric 

intake. 





-Order PEG





(2) Delirium


ICD Codes:  R41.0 - Disorientation, unspecified


Status:  Acute


Plan:  Patient appears to be still delirious vs severe baseline dementia this 

morning.





Nonpharmacologic treatment: Maintain orientation if possible, minimize sleep 

deprivation, maintain mobility (out of bed to chair with PT) if and when able


Consider pharmacologic treatment if needed: Haloperidol when necessary





restraints because the patient is removing her nasal cannula and is desatting





1. Keep pain controlled with morphine; pt gets more agitated when pain 

inadequately controlled


2. Can use Haldol 0.5mg IV PRN as last resort





(3) Fracture, tibia and fibula, shaft


ICD Codes:  S82.209A - Unspecified fracture of shaft of unspecified tibia, 

initial encounter for closed fracture; S82.409A - Unspecified fracture of shaft 

of unspecified fibula, initial encounter for closed fracture


Status:  Acute


Plan:  -Orthopedics consult (Dr Velásquez) 


will continue to follow recs


-Morphine 2mg IV q3h PRN pain 4-10


-Morphine 4mg IV q3h PRN breakthru





**pt often gets agitated and will start pulling at sheets/bed coverings and 

taking off nasal cannula if not comfortable**


**consider giving pain control meds as first line to reduce agitation and 

improve comfort for now**





(4) Pulmonary edema


ICD Codes:  J81.1 - Chronic pulmonary edema


Status:  Acute


Plan:  Chest xray on 2/24 concerning for pulmonary edema.


Careful with IV fluids, however patient is not eating yet


Sit upright in bed


Consider lasix and repeat chest xray if SOB





(5) Atrial fibrillation with RVR


ICD Codes:  I48.91 - Unspecified atrial fibrillation


Status:  Acute


Plan:  -Cardiology consult--appreciate recs


Patient not currently tolerating PO and is delirious/ severely demented. 


-Pt stable on Diltiazem 30mg PO q6h 


-If rate not controlled, check to see when last morphine dose was given--keep 

comfortable/give pain meds as first line tx





(6) Dementia


ICD Codes:  F03.90 - Unspecified dementia without behavioral disturbance


Status:  Chronic


Plan:  Stable. Takes no medications at shelter





(7) FEN/GI/PPx


Status:  Acute


Plan:  Fluids: D5 NS IVF @80ml/hr


Electrolytes: will monitor and replete as necessary


Nutrition: patient not currently eating


discussed with family re potential tube placement


GI: not indicated


PPx: SCD on RLE; lovenox 30 per ortho





CM to assist with eventual SNF


PT tomorrow to eval and treat, was able to sit up by the side of bed with PT, 

but challenging as pt cannot readily follow commands


 (Dheeraj Hurd MD R1)





Problem Qualifiers





(1) Fracture, tibia and fibula, shaft:  


Qualified Codes:  S82.202A - Unspecified fracture of shaft of left tibia, 

initial encounter for closed fracture; S82.402A - Unspecified fracture of shaft 

of left fibula, initial encounter for closed fracture


(2) Pulmonary edema:  


Qualified Codes:  J81.0 - Acute pulmonary edema


(3) Dementia:  


Qualified Codes:  F03.90 - Unspecified dementia without behavioral disturbance








Dheeraj Hurd MD R1 Feb 27, 2018 14:43


Jessica Quarles MD Feb 27, 2018 14:59

## 2018-02-27 NOTE — HHI.HCPN
Reason for visit


   a.  To assist with evaluation and management of symptoms including: Confusion

, agitation


   b.  To assist medical decision maker(s) with: better understanding of 

current medical conditions; weighing benefits/burdens of medical treatment 

options; making        


        medical treatment decisions.





Subjective/Interval History


She is more alert today, sitting up in bed being fed by the nurses aide.  She 

remains minimally verbal, mumbling under her breath, does not respond to 

questions, occasionally says a word or makes a sound like "Ow" when being 

repositioned or removed.  She remains in soft wrist restraints and she tries to 

climb out of bed and pulls on medical catheters and devices.





Orthopedic surgery is following for left distal 1/3 tibia and fibula shaft 

fractures status post intramedullary nailing by Dr. Velásquez in .  Patient is 

postop day 4, remains nonweightbearing to the left lower extremity.  

Recommending case management seek rehab placement with outpatient follow-up 

with orthopedic in 2 weeks.





Cardiology is following for new onset atrial fibrillation.  Rate is currently 

controlled, recommending anticoagulation, however noting the risk of bleeding 

complication due to high fall risk, dementia and difficulty with compliance.  

Family is refusing anti-regulation and requesting aspirin 81 mg daily.  They 

state they do understand that the stroke risk would be higher without oral 

anticoagulation but do not wish the risk associated with those medications.





Clinical data:


* Laboratory: WBC 8.0, hemoglobin 9.2, hematocrit 27.7, platelets 424, sodium 

139, potassium 3.5, BUN 4, creatinine 0.40.





Physical therapy is following and performing passive range of motion as 

tolerated.  Left lower extremity is nonweightbearing.  She is able to sit on 

the side of the bed with 1 person assistance for a short time but noted to be 

quite rigid with all mobility.  Recommended continuing physical therapy at 

rehab.





Per case management notes, family will be taking patient to their home and 

caring for her, not sending her to rehab.  They are requesting a hospital bed.





.


Family/friend interactions


Left message for family.


.





Advance Directives


Living Will:  Copy in medical record


Health Care Surrogate:  Copy in medical record


Durable Power of :  Copy in medical record


Advance Directive Specifics


Date completed:


2014.


.


Health Care Surrogate(s):


Son-in-law, Prashanth Sidhu is named as the healthcare surrogate in the 2014 

DURABLE POWER OF  to include designation of healthcare surrogate with 

her daughter, Vanessa as the alternate.  The Living Will dated 2014 

designates the daughter Vanessa as the healthcare surrogate.  In my discussion 

with the daughter, Vanessa, she states that her , Prashanth, is the 

healthcare surrogate.


.


Documented care wishes:


Living will on chart with standard verbiage.


.





Objective





Vital Signs








  Date Time  Temp Pulse Resp B/P (MAP) Pulse Ox O2 Delivery O2 Flow Rate FiO2


 


18 12:13     93 Nasal Cannula 3.00 


 


18 08:00      Nasal Cannula 3.00 


 


18 08:00 98.2 116 24 133/91 (105) 95   


 


18 04:10  119      


 


18 04:00 98.2 120 19 138/81 (100) 93   


 


18 00:00 98.1 124 20 125/72 (89) 93   


 


18 23:53  97      


 


18 21:48  136      


 


18 20:00 98.8 140 20 118/73 (88) 94   


 


18 19:45      Nasal Cannula 3.00 


 


18 18:39 98.1 98 18 137/70 (92) 91   


 


18 16:00  119      


 


18 16:00 98.1 119 18 126/69 (88) 95   


 


18 14:00  98      














Intake & Output  


 


 18





 07:00 19:00


 


Intake Total 818 ml 


 


Output Total 1200 ml 


 


Balance -382 ml 


 


  


 


Intake Oral 200 ml 


 


IV Total 618 ml 


 


Output Urine Total 1200 ml 








Physical Exam


CONSTITUTIONAL/GENERAL: This is an adequately nourished patient, in no acute 

distress.


TUBES/LINES/DRAINS: Left forearm PIV, Ritter.


NECK: Trachea midline. Supple, nontender. No palpable thyroid enlargement or 

nodularity. 


CARDIOVASCULAR: Irregular rhythm, controlled rate, left lower extremity 

swelling without pitting, Doppler pulses.


RESPIRATORY/CHEST: Breath sounds clear, diminished.


GASTROINTESTINAL: Abdomen soft, non-tender, nondistended. No hepato-splenomegaly

, or palpable masses. No guarding. Bowel sounds present.


GENITOURINARY: Without palpable bladder distension.  [place].


MUSCULOSKELETAL: Extremities without clubbing, cyanosis, or edema.  Left leg in 

surgical dressings.


NEUROLOGICAL: Awake and alert.  Not oriented.  Motor and sensory grossly within 

normal limits.  Does not follow commands. Moves all extremities.


PSYCHIATRIC: Confused.  Less agitated today.  Eating with assistance.


.





Diagnostic Tests


Laboratory





Laboratory Tests








Test


  18


03:30 18


16:50 18


09:39 18


06:30


 


White Blood Count


  9.1 TH/MM3


(4.0-11.0) 


  9.1 TH/MM3


(4.0-11.0) 8.0 TH/MM3


(4.0-11.0)


 


Red Blood Count


  3.13 MIL/MM3


(4.00-5.30) 


  3.46 MIL/MM3


(4.00-5.30) 3.38 MIL/MM3


(4.00-5.30)


 


Hemoglobin


  8.7 GM/DL


(11.6-15.3) 


  9.4 GM/DL


(11.6-15.3) 9.2 GM/DL


(11.6-15.3)


 


Hematocrit


  26.0 %


(35.0-46.0) 


  28.8 %


(35.0-46.0) 27.7 %


(35.0-46.0)


 


Mean Corpuscular Volume


  83.2 FL


(80.0-100.0) 


  83.1 FL


(80.0-100.0) 82.0 FL


(80.0-100.0)


 


Mean Corpuscular Hemoglobin


  27.7 PG


(27.0-34.0) 


  27.3 PG


(27.0-34.0) 27.3 PG


(27.0-34.0)


 


Mean Corpuscular Hemoglobin


Concent 33.3 %


(32.0-36.0) 


  32.8 %


(32.0-36.0) 33.3 %


(32.0-36.0)


 


Red Cell Distribution Width


  16.8 %


(11.6-17.2) 


  16.8 %


(11.6-17.2) 16.8 %


(11.6-17.2)


 


Platelet Count


  271 TH/MM3


(150-450) 


  407 TH/MM3


(150-450) 424 TH/MM3


(150-450)


 


Mean Platelet Volume


  9.5 FL


(7.0-11.0) 


  9.0 FL


(7.0-11.0) 8.9 FL


(7.0-11.0)


 


Neutrophils (%) (Auto)


  76.6 %


(16.0-70.0) 


  


  75.0 %


(16.0-70.0)


 


Lymphocytes (%) (Auto)


  12.3 %


(9.0-44.0) 


  


  13.6 %


(9.0-44.0)


 


Monocytes (%) (Auto)


  8.7 %


(0.0-8.0) 


  


  7.7 %


(0.0-8.0)


 


Eosinophils (%) (Auto)


  1.3 %


(0.0-4.0) 


  


  2.8 %


(0.0-4.0)


 


Basophils (%) (Auto)


  1.1 %


(0.0-2.0) 


  


  0.9 %


(0.0-2.0)


 


Neutrophils # (Auto)


  7.0 TH/MM3


(1.8-7.7) 


  


  6.0 TH/MM3


(1.8-7.7)


 


Lymphocytes # (Auto)


  1.1 TH/MM3


(1.0-4.8) 


  


  1.1 TH/MM3


(1.0-4.8)


 


Monocytes # (Auto)


  0.8 TH/MM3


(0-0.9) 


  


  0.6 TH/MM3


(0-0.9)


 


Eosinophils # (Auto)


  0.1 TH/MM3


(0-0.4) 


  


  0.2 TH/MM3


(0-0.4)


 


Basophils # (Auto)


  0.1 TH/MM3


(0-0.2) 


  


  0.1 TH/MM3


(0-0.2)


 


CBC Comment DIFF FINAL    DIFF FINAL 


 


Differential Comment      


 


Blood Urea Nitrogen 9 MG/DL (7-18)   5 MG/DL (7-18)  4 MG/DL (7-18) 


 


Creatinine


  0.37 MG/DL


(0.50-1.00) 


  0.50 MG/DL


(0.50-1.00) 0.40 MG/DL


(0.50-1.00)


 


Random Glucose


  79 MG/DL


() 


  115 MG/DL


() 112 MG/DL


()


 


Calcium Level


  8.2 MG/DL


(8.5-10.1) 


  8.6 MG/DL


(8.5-10.1) 8.4 MG/DL


(8.5-10.1)


 


Sodium Level


  138 MEQ/L


(136-145) 


  138 MEQ/L


(136-145) 139 MEQ/L


(136-145)


 


Potassium Level


  3.6 MEQ/L


(3.5-5.1) 


  3.3 MEQ/L


(3.5-5.1) 3.5 MEQ/L


(3.5-5.1)


 


Chloride Level


  104 MEQ/L


() 


  103 MEQ/L


() 107 MEQ/L


()


 


Carbon Dioxide Level


  25.7 MEQ/L


(21.0-32.0) 


  28.8 MEQ/L


(21.0-32.0) 25.0 MEQ/L


(21.0-32.0)


 


Anion Gap 8 MEQ/L (5-15)   6 MEQ/L (5-15)  7 MEQ/L (5-15) 


 


Estimat Glomerular Filtration


Rate 167 ML/MIN


(>89) 


  118 ML/MIN


(>89) 153 ML/MIN


(>89)


 


Nasal Screen MRSA (PCR)


  


  MRSA NOT


DETECTED (NOT 


  


 





.


Result Diagram:  


18





Imaging





Last Impressions








Chest X-Ray 18 0000 Signed





Impressions: 





 Service Date/Time:  2018 11:29 - CONCLUSION:  





 Interstitial pulmonary edema. This appears to be increased compared to the 

prior 





 exam.     Petr Salgado MD 


 


Tibia/Fibula X-Ray 18 0000 Signed





Impressions: 





 Service Date/Time:  2018 09:57 - CONCLUSION:  1. Status 





 post left tibial IM braydon fixation, as above.     Bennie Barger MD 


 


Pelvis X-Ray 18 0000 Signed





Impressions: 





 Service Date/Time:   10:21 - CONCLUSION:  The bony 





 pelvic ring is grossly intact.  Symmetric appearance of bilateral total hip 





 arthroplasty.     Dylan Candelaria MD 


 


Femur X-Ray 18 0000 Signed





Impressions: 





 Service Date/Time:   10:23 - CONCLUSION:  1. No 





 fracture seen. 2. Possible knee effusion.     Dylan Candelaria MD 





.


Procedures


Reduction and intramedullary nail fixation of the left tibia


.





Assessment and Plan


Disease Oriented Problem List:  


(1) Fracture, tibia and fibula, shaft


(2) Agitation


(3) Dementia


(4) Atrial fibrillation with RVR


Symptom Scale:  


(1) Confusion


(2) Agitation


Pertinent Non-Medical Issues


Psychosocial:She was born in [place], [place] where she finished high school and 

attended nursing school.  She worked as the charge operating room registered 

nurse for many years.  She was  and had 2 daughters and  many 

years ago.  Daughter is .  She has a history of alcohol abuse and 

required admission for detoxification in [place] prior to her daughter moving 

her to [place] where the remainder of the family lived.


.


Spiritual: Nonpracticing Jewish.


.


Legal: Son-in-lawLee and daughter Vanessa Sidhu are listed as healthcare 

surrogate and alternate.


.


Ethical issues impacting care: None noted.


.


Important Contacts


Son-in-lawLee: [phone]


Daughter: Vanessa Sidhu [phone]


Granddaughter: Ruma: [phone]


Grandson: Reji [phone]


.


Prognosis


Her prognosis is guarded.  While she will likely recover from the fracture, she 

remains in atrial fibrillation.  She is a relatively high fall risk if in her 

decreased mobility, agitation, confusion and history of a recent fall resulting 

in the fracture.  While anticoagulation would be recommended for thromboembolic 

prophylaxis, it also places her at an elevated risk of injury and bleeding in 

case of fall.  This is under discussion by the consulting physicians who will 

determine the risk benefit profile.  She does have significant dementia and is 

likely to continue to decline with resultant complications and recurrent 

hospitalizations.


.


Code Status:  Full Code


Plan


PLAN:


   Legal decision maker: Patient is not capacitated for decision-making.  Son-in

-law, Prashanth Sidhu, is reportedly the healthcare power of .  DPOA names 

him as the healthcare surrogate.  Living Will and separate healthcare surrogate 

name her daughter, Vanessa Sidhu as the healthcare surrogate.  All documents are 

dated 2014.  Daughter, Vanessa, indicates that her  is the healthcare 

surrogate record, however the family is working together harmoniously together 

with consistent goals.





   Goals: Aggressive.





   CODE STATUS: FULL CODE





SYMPTOMS:


* Confusion: Patient at baseline has severe dementia.  She is intermittently 

able to give 1 word answers to simple questions, i.e., are you in pain?,  Are 

you cold?  She is intermittently able to make some of her needs known.  She 

will require close clinical monitoring to manage postoperative discomfort due 

to her baseline dementia.





* Agitation: She remains mildly agitated, attempting to get out of bed, 

requiring restraints.  She is unable to follow directions and would likely be a 

poor rehabilitation candidate due to her inability to comprehend.  She is at 

risk for further falls due to her inability to comprehend her safety risk.











Palliative care will continue to follow the patient during hospital course as 

condition evolves, to assist patient/decision-maker with understanding of their 

medical conditions, weighing benefits/burdens of treatment options, for 

clarification of goals of treatment.  Additionally will assist with any 

symptoms of palliative concern.


.





Attestation


To help prompt me to consider important information that might be impacting 

today's encounter and assessment, information from prior notes written by 

myself or my colleagues may have been "brought forward" into today's note.  My 

signature on this note, however, is an attestation that I personally performed 

the exam, history, and/or decision-making noted today, and, unless otherwise 

indicated, the interactions with patient, family, and staff as well as the 

review of records all occurred today.  I also attest that the listed assessment 

and stated plan reflect my best clinical judgment today based on the 

combination of historical information, prior notes, and today's exam/ 

interactions.  When time spent is documented, it refers only to time spent 

today by the signer, or if indicated, combined time spent today by 

collaborating physician/nurse practitioner.


.











Francy Peña 2018 1:52 pm

## 2018-02-27 NOTE — PD.CONS
HPI


History of Present Illness


This is a 82 year old female with dementia who presented from an [facility] after a 

fall.  She is s/p IM nail fixation left tibia. She has also developed AF. GI 

has been consulted for EGD with PEG tube placement.  pt has been having minimal 

PO intake and per attending note, her family has agreed to PEG tube placement 

and then want to take her home. 


 (Rosey Goss)





PFSH


Past Medical History


Dementia


Fractures of both hands and wrists several years ago


History of alcoholism


Osteoporosis





.


Past Surgical History


Bilateral hip replacement


Reduction and intramedullary nail fixation of the left tibia





.


 (Rosey Goss)


Coded Allergies:  


     No Known Allergies (Unverified , 2/22/18)


Family History


Unobtainable.


.


Social History


unk


 (Rosey Goss)





Review of Systems


noncontributory


 (Rosey Goss)





GI Exam


Vitals I&O





Vital Signs








  Date Time  Temp Pulse Resp B/P (MAP) Pulse Ox O2 Delivery O2 Flow Rate FiO2


 


2/27/18 16:00      Nasal Cannula 3.00 


 


2/27/18 12:13     93 Nasal Cannula 3.00 


 


2/27/18 12:00      Nasal Cannula 3.00 


 


2/27/18 12:00 97.4 101 20 135/87 (103) 95   


 


2/27/18 08:00      Nasal Cannula 3.00 


 


2/27/18 08:00 98.2 116 24 133/91 (105) 95   


 


2/27/18 04:10  119      


 


2/27/18 04:00 98.2 120 19 138/81 (100) 93   


 


2/27/18 00:00 98.1 124 20 125/72 (89) 93   


 


2/26/18 23:53  97      


 


2/26/18 21:48  136      


 


2/26/18 20:00 98.8 140 20 118/73 (88) 94   


 


2/26/18 19:45      Nasal Cannula 3.00 


 


2/26/18 18:39 98.1 98 18 137/70 (92) 91   














I/O      


 


 2/26/18 2/26/18 2/26/18 2/27/18 2/27/18 2/27/18





 07:00 15:00 23:00 07:00 15:00 23:00


 


Intake Total 1000 ml 35 ml 100 ml 718 ml  


 


Output Total 800 ml   1200 ml  


 


Balance 200 ml 35 ml 100 ml -482 ml  


 


      


 


Intake Oral    200 ml  


 


IV Total 1000 ml 35 ml 100 ml 518 ml  


 


Output Urine Total 800 ml   1200 ml  


 


# Bowel Movements 0     








Imaging





Last Impressions








Chest X-Ray 2/24/18 0000 Signed





Impressions: 





 Service Date/Time:  Saturday, February 24, 2018 11:29 - CONCLUSION:  





 Interstitial pulmonary edema. This appears to be increased compared to the 

prior 





 exam.     Petr Salgado MD 


 


Tibia/Fibula X-Ray 2/23/18 0000 Signed





Impressions: 





 Service Date/Time:  Friday, February 23, 2018 09:57 - CONCLUSION:  1. Status 





 post left tibial IM braydon fixation, as above.     Bennie Barger MD 


 


Pelvis X-Ray 2/22/18 0000 Signed





Impressions: 





 Service Date/Time:  Thursday, February 22, 2018 10:21 - CONCLUSION:  The bony 





 pelvic ring is grossly intact.  Symmetric appearance of bilateral total hip 





 arthroplasty.     Dylan Candelaria MD 


 


Femur X-Ray 2/22/18 0000 Signed





Impressions: 





 Service Date/Time:  Thursday, February 22, 2018 10:23 - CONCLUSION:  1. No 





 fracture seen. 2. Possible knee effusion.     Dylan Candelaria MD 








Laboratory











Test


  2/27/18


06:30


 


White Blood Count 8.0 TH/MM3 


 


Red Blood Count 3.38 MIL/MM3 


 


Hemoglobin 9.2 GM/DL 


 


Hematocrit 27.7 % 


 


Mean Corpuscular Volume 82.0 FL 


 


Mean Corpuscular Hemoglobin 27.3 PG 


 


Mean Corpuscular Hemoglobin


Concent 33.3 % 


 


 


Red Cell Distribution Width 16.8 % 


 


Platelet Count 424 TH/MM3 


 


Mean Platelet Volume 8.9 FL 


 


Neutrophils (%) (Auto) 75.0 % 


 


Lymphocytes (%) (Auto) 13.6 % 


 


Monocytes (%) (Auto) 7.7 % 


 


Eosinophils (%) (Auto) 2.8 % 


 


Basophils (%) (Auto) 0.9 % 


 


Neutrophils # (Auto) 6.0 TH/MM3 


 


Lymphocytes # (Auto) 1.1 TH/MM3 


 


Monocytes # (Auto) 0.6 TH/MM3 


 


Eosinophils # (Auto) 0.2 TH/MM3 


 


Basophils # (Auto) 0.1 TH/MM3 


 


CBC Comment DIFF FINAL 


 


Differential Comment  


 


Blood Urea Nitrogen 4 MG/DL 


 


Creatinine 0.40 MG/DL 


 


Random Glucose 112 MG/DL 


 


Calcium Level 8.4 MG/DL 


 


Sodium Level 139 MEQ/L 


 


Potassium Level 3.5 MEQ/L 


 


Chloride Level 107 MEQ/L 


 


Carbon Dioxide Level 25.0 MEQ/L 


 


Anion Gap 7 MEQ/L 


 


Estimat Glomerular Filtration


Rate 153 ML/MIN 


 














 Date/Time


Source Procedure


Growth Status


 


 


 2/24/18 12:30


Urine Clean Catch Urine Culture - Final


NO GROWTH IN 48 HOURS. Complete








Physical Examination


HEENT: PERRL; normocephalic; atraumatic; no jaundice.  


CHEST:  CTA shallow respirations


CARDIAC:  irr HR, tachy


ABDOMEN:  Soft, nondistended, nontender; no hepatosplenomegaly; bowel sounds 

are present in all four quadrants.


EXTREMITIES: No clubbing, cyanosis, or edema.


SKIN:  Normal; no rash; no jaundice.


CNS:  confused


 (Rosey Goss)





Assessment and Plan


Plan


ASSESSMENT


- decreased PO intake -  pt with dementia, s/p fall and repair LLE fractures. 

poor PO intake.  attempted to call


  Prashanth Sidhu [phone] but no answer.  per attending note family has 

agreed to PEG tube placement


- AF, cardiology following, on ASA per family





PLAN


- EGD with PEG tube placement


- obtain consent


- NPO after MN


- 1G ancef on call


- further recs to follow





pt seen by myself and Dr Murphy and this note is written on his behalf


 (Rosey Goss)


Plan


Patient was seen and examined, agree with above-noted, we will plan on upper 

endoscopy with PEG tube placement


 (Heather Murphy MD)











Rosey Goss Feb 27, 2018 16:42


Heather Murphy MD Feb 28, 2018 11:05

## 2018-02-28 VITALS
RESPIRATION RATE: 18 BRPM | SYSTOLIC BLOOD PRESSURE: 140 MMHG | DIASTOLIC BLOOD PRESSURE: 73 MMHG | OXYGEN SATURATION: 97 % | HEART RATE: 89 BPM | TEMPERATURE: 98.2 F

## 2018-02-28 VITALS
OXYGEN SATURATION: 99 % | HEART RATE: 112 BPM | TEMPERATURE: 97.8 F | SYSTOLIC BLOOD PRESSURE: 125 MMHG | RESPIRATION RATE: 20 BRPM | DIASTOLIC BLOOD PRESSURE: 86 MMHG

## 2018-02-28 VITALS
HEART RATE: 131 BPM | TEMPERATURE: 97.5 F | DIASTOLIC BLOOD PRESSURE: 99 MMHG | SYSTOLIC BLOOD PRESSURE: 164 MMHG | OXYGEN SATURATION: 94 % | RESPIRATION RATE: 19 BRPM

## 2018-02-28 VITALS — OXYGEN SATURATION: 95 %

## 2018-02-28 VITALS — HEART RATE: 82 BPM

## 2018-02-28 VITALS
TEMPERATURE: 98.6 F | OXYGEN SATURATION: 96 % | SYSTOLIC BLOOD PRESSURE: 124 MMHG | HEART RATE: 79 BPM | RESPIRATION RATE: 19 BRPM | DIASTOLIC BLOOD PRESSURE: 64 MMHG

## 2018-02-28 VITALS — HEART RATE: 104 BPM

## 2018-02-28 VITALS — HEART RATE: 98 BPM

## 2018-02-28 VITALS — OXYGEN SATURATION: 96 %

## 2018-02-28 LAB
BASOPHILS # BLD AUTO: 0.1 TH/MM3 (ref 0–0.2)
BASOPHILS NFR BLD: 1.5 % (ref 0–2)
BUN SERPL-MCNC: 4 MG/DL (ref 7–18)
CALCIUM SERPL-MCNC: 8.5 MG/DL (ref 8.5–10.1)
CHLORIDE SERPL-SCNC: 108 MEQ/L (ref 98–107)
CREAT SERPL-MCNC: 0.44 MG/DL (ref 0.5–1)
EOSINOPHIL # BLD: 0.4 TH/MM3 (ref 0–0.4)
EOSINOPHIL NFR BLD: 5.3 % (ref 0–4)
ERYTHROCYTE [DISTWIDTH] IN BLOOD BY AUTOMATED COUNT: 16.6 % (ref 11.6–17.2)
GFR SERPLBLD BASED ON 1.73 SQ M-ARVRAT: 137 ML/MIN (ref 89–?)
GLUCOSE SERPL-MCNC: 103 MG/DL (ref 74–106)
HCO3 BLD-SCNC: 27.6 MEQ/L (ref 21–32)
HCT VFR BLD CALC: 30.1 % (ref 35–46)
HGB BLD-MCNC: 9.8 GM/DL (ref 11.6–15.3)
INR PPP: 2.5 RATIO
LYMPHOCYTES # BLD AUTO: 1.3 TH/MM3 (ref 1–4.8)
LYMPHOCYTES NFR BLD AUTO: 18.9 % (ref 9–44)
MCH RBC QN AUTO: 26.8 PG (ref 27–34)
MCHC RBC AUTO-ENTMCNC: 32.6 % (ref 32–36)
MCV RBC AUTO: 81.9 FL (ref 80–100)
MONOCYTE #: 0.6 TH/MM3 (ref 0–0.9)
MONOCYTES NFR BLD: 8.5 % (ref 0–8)
NEUTROPHILS # BLD AUTO: 4.6 TH/MM3 (ref 1.8–7.7)
NEUTROPHILS NFR BLD AUTO: 65.8 % (ref 16–70)
PLATELET # BLD: 446 TH/MM3 (ref 150–450)
PMV BLD AUTO: 8.7 FL (ref 7–11)
PROTHROMBIN TIME: 25.4 SEC (ref 9.8–11.6)
RBC # BLD AUTO: 3.67 MIL/MM3 (ref 4–5.3)
SODIUM SERPL-SCNC: 142 MEQ/L (ref 136–145)
WBC # BLD AUTO: 7 TH/MM3 (ref 4–11)

## 2018-02-28 PROCEDURE — 0DB78ZX EXCISION OF STOMACH, PYLORUS, VIA NATURAL OR ARTIFICIAL OPENING ENDOSCOPIC, DIAGNOSTIC: ICD-10-PCS | Performed by: INTERNAL MEDICINE

## 2018-02-28 PROCEDURE — 0DH63UZ INSERTION OF FEEDING DEVICE INTO STOMACH, PERCUTANEOUS APPROACH: ICD-10-PCS | Performed by: INTERNAL MEDICINE

## 2018-02-28 RX ADMIN — CALCIUM CARBONATE-CHOLECALCIFEROL TAB 250 MG-125 UNIT SCH MG: 250-125 TAB at 18:00

## 2018-02-28 RX ADMIN — MORPHINE SULFATE SCH MG: 2 INJECTION, SOLUTION INTRAMUSCULAR; INTRAVENOUS at 00:07

## 2018-02-28 RX ADMIN — STANDARDIZED SENNA CONCENTRATE AND DOCUSATE SODIUM SCH TAB: 8.6; 5 TABLET, FILM COATED ORAL at 21:00

## 2018-02-28 RX ADMIN — Medication PRN ML: at 00:07

## 2018-02-28 RX ADMIN — DILTIAZEM HYDROCHLORIDE SCH MG: 30 TABLET, FILM COATED ORAL at 12:00

## 2018-02-28 RX ADMIN — MORPHINE SULFATE SCH MG: 2 INJECTION, SOLUTION INTRAMUSCULAR; INTRAVENOUS at 23:12

## 2018-02-28 RX ADMIN — VITAMIN D, TAB 1000IU (100/BT) SCH UNITS: 25 TAB at 09:00

## 2018-02-28 RX ADMIN — MORPHINE SULFATE SCH MG: 2 INJECTION, SOLUTION INTRAMUSCULAR; INTRAVENOUS at 08:00

## 2018-02-28 RX ADMIN — MORPHINE SULFATE SCH MG: 2 INJECTION, SOLUTION INTRAMUSCULAR; INTRAVENOUS at 12:00

## 2018-02-28 RX ADMIN — MORPHINE SULFATE SCH MG: 2 INJECTION, SOLUTION INTRAMUSCULAR; INTRAVENOUS at 04:00

## 2018-02-28 RX ADMIN — CALCIUM CARBONATE-CHOLECALCIFEROL TAB 250 MG-125 UNIT SCH MG: 250-125 TAB at 13:00

## 2018-02-28 RX ADMIN — MAGNESIUM SULFATE HEPTAHYDRATE SCH MLS/HR: 500 INJECTION, SOLUTION INTRAMUSCULAR; INTRAVENOUS at 02:36

## 2018-02-28 RX ADMIN — DILTIAZEM HYDROCHLORIDE SCH MG: 30 TABLET, FILM COATED ORAL at 23:11

## 2018-02-28 RX ADMIN — STANDARDIZED SENNA CONCENTRATE AND DOCUSATE SODIUM SCH TAB: 8.6; 5 TABLET, FILM COATED ORAL at 09:00

## 2018-02-28 RX ADMIN — DILTIAZEM HYDROCHLORIDE SCH MG: 30 TABLET, FILM COATED ORAL at 05:22

## 2018-02-28 RX ADMIN — Medication SCH ML: at 09:00

## 2018-02-28 RX ADMIN — OXYCODONE HYDROCHLORIDE AND ACETAMINOPHEN SCH MG: 500 TABLET ORAL at 09:00

## 2018-02-28 RX ADMIN — SODIUM CHLORIDE PRN MLS/HR: 900 INJECTION, SOLUTION INTRAVENOUS at 18:36

## 2018-02-28 RX ADMIN — Medication SCH ML: at 21:00

## 2018-02-28 RX ADMIN — DILTIAZEM HYDROCHLORIDE SCH MG: 30 TABLET, FILM COATED ORAL at 18:00

## 2018-02-28 RX ADMIN — Medication PRN ML: at 08:40

## 2018-02-28 RX ADMIN — DILTIAZEM HYDROCHLORIDE SCH MG: 30 TABLET, FILM COATED ORAL at 00:06

## 2018-02-28 RX ADMIN — MORPHINE SULFATE SCH MG: 2 INJECTION, SOLUTION INTRAMUSCULAR; INTRAVENOUS at 16:55

## 2018-02-28 RX ADMIN — MORPHINE SULFATE SCH MG: 2 INJECTION, SOLUTION INTRAMUSCULAR; INTRAVENOUS at 20:00

## 2018-02-28 RX ADMIN — CALCIUM CARBONATE-CHOLECALCIFEROL TAB 250 MG-125 UNIT SCH MG: 250-125 TAB at 09:00

## 2018-02-28 RX ADMIN — ENOXAPARIN SODIUM SCH MG: 30 INJECTION SUBCUTANEOUS at 09:30

## 2018-02-28 RX ADMIN — ASPIRIN SCH MG: 81 TABLET ORAL at 09:00

## 2018-02-28 NOTE — PD.CARD.PN
Subjective


Subjective Remarks


awake in nad





Objective


Medications





Current Medications








 Medications


  (Trade)  Dose


 Ordered  Sig/César


 Route  Start Time


 Stop Time Status Last Admin


 


  (NS Flush)  2 ml  UNSCH  PRN


 IV FLUSH  2/22/18 13:15


    2/28/18 08:40


 


 


  (NS Flush)  2 ml  BID


 IV FLUSH  2/22/18 21:00


    2/26/18 21:30


 


 


  (Tylenol)  650 mg  Q4H  PRN


 PO  2/22/18 13:15


     


 


 


  (Narcan Inj)  0.4 mg  UNSCH  PRN


 IV PUSH  2/22/18 13:15


     


 


 


  (Halina-Colace)  1 tab  BID


 PO  2/22/18 21:00


    2/27/18 21:47


 


 


  (Milk Of


 Magnesia Liq)  30 ml  Q12H  PRN


 PO  2/22/18 13:15


     


 


 


  (Senokot)  17.2 mg  Q12H  PRN


 PO  2/22/18 13:15


     


 


 


  (Dulcolax Supp)  10 mg  DAILY  PRN


 RECTAL  2/22/18 13:15


     


 


 


  (Lactulose Liq)  30 ml  DAILY  PRN


 PO  2/22/18 13:15


     


 


 


  (Lovenox Inj)  30 mg  Q24H


 SQ  2/24/18 09:30


    2/27/18 13:27


 


 


  (Oscal-D 250-125)  250 mg  TID


 PO  2/23/18 13:00


    2/27/18 18:00


 


 


  (Benadryl)  25 mg  Q6H  PRN


 PO  2/23/18 10:15


     


 


 


  (Drisdol)  50,000 units  Q7D


 PO  2/23/18 11:00


     


 


 


  (Vitamin D3)  1,000 units  DAILY


 PO  2/24/18 09:00


    2/27/18 08:18


 


 


  (Vitamin C)  1,000 mg  DAILY


 PO  2/24/18 09:00


    2/27/18 08:18


 


 


  (Morphine Inj)  4 mg  Q3H  PRN


 IV PUSH  2/24/18 13:45


    2/26/18 11:52


 


 


  (Haldol Inj)  0.5 mg  Q6HR  PRN


 IV PUSH  2/24/18 12:15


    2/24/18 20:12


 


 


 Dextrose/Sodium


 Chloride  1,000 ml @ 


 80 mls/hr  [id number]


 IV  2/25/18 13:00


   Future hold 2/28/18 02:36


 


 


  (Cardizem)  30 mg  Q6HR


 PO  2/26/18 12:00


    2/28/18 05:22


 


 


 Diltiazem HCl 125


 mg/Sodium Chloride  125 ml @ 5


 mls/hr  TITRATE  PRN


 IV  2/26/18 11:00


    2/28/18 18:36


 


 


  (Ecotrin Ec)  81 mg  DAILY


 PO  2/28/18 09:00


     


 


 


  (Morphine Inj)  2 mg  Q4HR


 IV PUSH  2/27/18 16:00


    2/28/18 16:55


 


 


 Cefazolin Sodium


 1000 mg/Sodium


 Chloride  100 ml @ 


 200 mls/hr  ON  CALL


 IV  2/27/18 16:45


 3/2/18 16:44   


 


 


 Lactated Ringer's  1,000 ml @ 


 30 mls/hr  Q24H PRN


 IV  2/28/18 06:00


 3/3/18 05:59  2/28/18 08:40


 


 


 Sodium Chloride  500 ml @ 


 30 mls/hr  [id number] PRN


 IV  2/28/18 06:00


 3/3/18 05:59   


 


 


  (Betadine 5%


 Antisepsis Kit)  1 applic  ON CALL  PRN


 EACH NARE  2/28/18 06:00


 3/3/18 05:59   


 


 


  (Chlorhexidine


 2% Cloth)  3 pack  ON CALL  PRN


 TOPICAL  2/28/18 06:00


 3/3/18 05:59   


 








Vital Signs / I&O





Vital Signs








  Date Time  Temp Pulse Resp B/P (MAP) Pulse Ox O2 Delivery O2 Flow Rate FiO2


 


2/28/18 18:36  121  164/99    


 


2/28/18 16:00 97.5 131 19 164/99 (120) 94   


 


2/28/18 15:44  120 18 131/69 (89) 96 Nasal Cannula 6 


 


2/28/18 09:55      Nasal Cannula 3.00 


 


2/28/18 08:00     97 Nasal Cannula 3.00 


 


2/28/18 07:44  82      


 


2/28/18 04:00 98.2 89 18 140/73 (95) 97   


 


2/28/18 03:48  98      


 


2/28/18 00:03  104      


 


2/28/18 00:00 98.6 79 19 124/64 (84) 96   


 


2/27/18 20:15      Nasal Cannula 3.00 


 


2/27/18 20:02  94      


 


2/27/18 20:00 98.0 107 19 128/81 (97) 94   














I/O      


 


 2/27/18 2/27/18 2/27/18 2/28/18 2/28/18 2/28/18





 06:59 14:59 22:59 06:59 14:59 22:59


 


Intake Total 718 ml  240 ml 1000 ml  420 ml


 


Output Total 1200 ml  240 ml   


 


Balance -482 ml  0 ml 1000 ml  420 ml


 


      


 


Intake Oral 200 ml  240 ml 0 ml  120 ml


 


IV Total 518 ml   1000 ml  


 


Other      300 ml


 


Output Urine Total 1200 ml  240 ml   


 


# Voids   1 6  1


 


# Bowel Movements   1 1  








Physical Exam


GENERAL: 


SKIN: Warm and dry.


HEAD: Normocephalic.


EYES: No scleral icterus. No injection or drainage. 


NECK: Supple, trachea midline. No JVD or lymphadenopathy.


CARDIOVASCULAR: Regular rate and rhythm without murmurs, gallops, or rubs. 


RESPIRATORY: Breath sounds equal bilaterally. No accessory muscle use.


GASTROINTESTINAL: Abdomen soft, non-tender, nondistended. 


MUSCULOSKELETAL: No cyanosis, or edema. 


BACK: Nontender without obvious deformity. No CVA tenderness.





Laboratory





Laboratory Tests








Test


  2/28/18


07:20 2/28/18


18:10


 


White Blood Count 7.0 TH/MM3  


 


Red Blood Count 3.67 MIL/MM3  


 


Hemoglobin 9.8 GM/DL  


 


Hematocrit 30.1 %  


 


Mean Corpuscular Volume 81.9 FL  


 


Mean Corpuscular Hemoglobin 26.8 PG  


 


Mean Corpuscular Hemoglobin


Concent 32.6 % 


  


 


 


Red Cell Distribution Width 16.6 %  


 


Platelet Count 446 TH/MM3  


 


Mean Platelet Volume 8.7 FL  


 


Neutrophils (%) (Auto) 65.8 %  


 


Lymphocytes (%) (Auto) 18.9 %  


 


Monocytes (%) (Auto) 8.5 %  


 


Eosinophils (%) (Auto) 5.3 %  


 


Basophils (%) (Auto) 1.5 %  


 


Neutrophils # (Auto) 4.6 TH/MM3  


 


Lymphocytes # (Auto) 1.3 TH/MM3  


 


Monocytes # (Auto) 0.6 TH/MM3  


 


Eosinophils # (Auto) 0.4 TH/MM3  


 


Basophils # (Auto) 0.1 TH/MM3  


 


CBC Comment DIFF FINAL  


 


Differential Comment   


 


Blood Urea Nitrogen 4 MG/DL  


 


Creatinine 0.44 MG/DL  


 


Random Glucose 103 MG/DL  


 


Calcium Level 8.5 MG/DL  


 


Sodium Level 142 MEQ/L  


 


Potassium Level 3.4 MEQ/L  


 


Chloride Level 108 MEQ/L  


 


Carbon Dioxide Level 27.6 MEQ/L  


 


Anion Gap 6 MEQ/L  


 


Estimat Glomerular Filtration


Rate 137 ML/MIN 


  


 


 


Prothrombin Time  25.4 SEC 


 


Prothromb Time International


Ratio 


  2.5 RATIO 


 











Assessment and Plan


Problem List:  


(1) New onset a-fib


ICD Codes:  I48.91 - Unspecified atrial fibrillation


Status:  Acute


(2) Atrial fibrillation with RVR


ICD Codes:  I48.91 - Unspecified atrial fibrillation


Status:  Acute


(3) Dementia


ICD Codes:  F03.90 - Unspecified dementia without behavioral disturbance


Status:  Chronic


(4) Fracture, tibia and fibula, shaft


ICD Codes:  S82.209A - Unspecified fracture of shaft of unspecified tibia, 

initial encounter for closed fracture; S82.409A - Unspecified fracture of shaft 

of unspecified fibula, initial encounter for closed fracture


Status:  Acute


Assessment and Plan


1.) Afib- rate controlled, pod # 5, ortho surgery, explained to daughter and 

son coumadin or noac indicated due to [id number] socre = 3, however bleeding 

and complication risk would be high due to fall risk, dementia and difficulty 

with compliance; they refuse coumadin or noac, request aspirin 81 mg qd 

understanding cva risk will be higher





Problem Qualifiers





(1) Dementia:  


Qualified Codes:  F03.90 - Unspecified dementia without behavioral disturbance


(2) Fracture, tibia and fibula, shaft:  


Qualified Codes:  S82.202A - Unspecified fracture of shaft of left tibia, 

initial encounter for closed fracture; S82.402A - Unspecified fracture of shaft 

of left fibula, initial encounter for closed fracture








Jayden Lama MD Feb 28, 2018 19:29

## 2018-02-28 NOTE — PD.PROCEDR
GI Procedure


PROCEDURE PERFORMED


Upper gastrointestinal endoscopy with PEG tube placement and biopsy





INDICATION FOR PROCEDURE


Dysphagia PROCEDURE:


The procedure, risks and benefits were discussed with Ms. Alvarenga and informed


consent was obtained.  Anesthesia sedated her with Diprivan.  She was placed in 

the left lateral decubitus position.





EGD:


The Pentax videoscope was introduced through the oropharynx and advanced to the 

second portion of the duodenum under direct visualization. Retroflexion was 

performed in the stomach.  The area for the PEG tube was identified by 

illumination and indentation, sterilized with Betadine, injected with lidocaine

, a 20 Irish Microvasive PEG tube was placed with a pull technique without 

anemia complication, verification of the PEG tube was done by endoscopy at the 

end of the case, biopsy from the antrum because of mild gastritis was done








ESTIMATED BLOOD LOSS:


None





SPECIMENS REMOVED:


Antrum





COMPLICATIONS:


None





IMPRESSION:


Mild gastritis biopsy from the antrum


20 Irish Microvasive PEG tube was placed with a pull technique without any 

immediate complication





PLAN:


Await biopsy results


Nothing by mouth for 6 hour if site okay may use the PEG tube for feeding and 

medication


If biopsies come back with gastritis patient can have H2 blocker











Heather Murphy MD Feb 28, 2018 15:23

## 2018-02-28 NOTE — HHI.FPPN
Subjective


Remarks


Patient seen in the preop holding area. Nursing reports she is anticipated to 

go to PEG placement soon. History limited secondary to mental state; although 

she is more awake today. She had a relevant conversation about the images on 

the television. She doesn't complain of anything: no pain, n/v/d, sob, cp, 

fevers, etc.





Objective


Vitals





Vital Signs








  Date Time  Temp Pulse Resp B/P (MAP) Pulse Ox O2 Delivery O2 Flow Rate FiO2


 


2/28/18 09:55      Nasal Cannula 3.00 


 


2/28/18 07:44  82      


 


2/28/18 04:00 98.2 89 18 140/73 (95) 97   


 


2/28/18 03:48  98      


 


2/28/18 00:03  104      


 


2/28/18 00:00 98.6 79 19 124/64 (84) 96   


 


2/27/18 20:15      Nasal Cannula 3.00 


 


2/27/18 20:02  94      


 


2/27/18 20:00 98.0 107 19 128/81 (97) 94   


 


2/27/18 17:22     93 Nasal Cannula 3.00 


 


2/27/18 16:00 97.6 128 18 129/61 (83) 97   


 


2/27/18 16:00      Nasal Cannula 3.00 


 


2/27/18 15:41  111      














I/O      


 


 2/27/18 2/27/18 2/27/18 2/28/18 2/28/18 2/28/18





 07:00 15:00 23:00 07:00 15:00 23:00


 


Intake Total 718 ml  240 ml 1000 ml  


 


Output Total 1200 ml  240 ml   


 


Balance -482 ml  0 ml 1000 ml  


 


      


 


Intake Oral 200 ml  240 ml 0 ml  


 


IV Total 518 ml   1000 ml  


 


Output Urine Total 1200 ml  240 ml   


 


# Voids   1 6  


 


# Bowel Movements   1 1  








Result Diagram:  


2/28/18 0720                                                                   

             2/28/18 0720





Objective Remarks


GENERAL: This is a thin, elderly female lying in bed. Appears more alert and 

awake today. Knows name.


SKIN: cool and dry 


HEAD: Atraumatic. Normocephalic. 


EYES:  No scleral icterus. No injection or drainage. 


ENT: Nose without drainage.


NECK: Trachea midline. No JVD or lymphadenopathy.  no meningeal signs.


CARDIOVASCULAR: irregular rate, not tachy, without murmur, gallop, or rub. 


RESPIRATORY: Clear to auscultation. Breath sounds equal bilaterally. No wheezes

, rales, or rhonchi.  


GASTROINTESTINAL: Abdomen soft, non-tender, nondistended. No hepato-splenomegaly

, or palpable masses. No guarding.


MUSCULOSKELETAL: Extremities without clubbing, cyanosis, or edema. No edema of 

RLE. LLE in boot and ace bandage to the knee. No right calf tenderness. SCD on 

RLE.


NEUROLOGICAL: Motor and sensory grossly within normal limits. More cooperative 

today





A/P


Assessment and Plan


83YO female w/PMHx dementia presents from half-way with comminuted fractures of 

distal 1/3 of left tibia and fibula and new onset atrial fibrillation with RVR. 

She is POD#4 Left Distal 1/3 Tibia and fibula shaft fxs s/p IMN by Dr Velásquez on 

2/23/18; initially started on Diltiazem gtt for afib RVR; now rate controlled 

with PO meds. GI consulted for PEG tube b/c of poor PO intake.


Discharge Planning


Pending PEG placement and SNF placement


grandchildren are [facility] [phone]


Problem List:  


(1) Failure to thrive in adult


ICD Codes:  R62.7 - Adult failure to thrive


Plan:  Pt with inadequate PO and concern by family about continued ability to 

be able to take adequate caloric intake. Pt is frail with recent osteoporotic 

fracture of left tibia and fibula from a fall, and pt cannot walk anymore. 

Discussion with daughter on 2/27 revealed their concern that she is not able to 

take adequate nutrition on her own. Daughter has verbally stated she would 

consent to PEG tube placement to ensure her mother gets adequate caloric 

intake. 





GI consult for PEG placement. Likely placement to happen today.





(2) Delirium


ICD Codes:  R41.0 - Disorientation, unspecified


Status:  Acute


Plan:  Nonpharmacologic treatment: Maintain orientation if possible, minimize 

sleep deprivation, maintain mobility (out of bed to chair with PT) if and when 

able


Consider pharmacologic treatment if needed: Haloperidol when necessary





1. Keep pain controlled with morphine; pt gets more agitated when pain 

inadequately controlled


2. Can use Haldol 0.5mg IV PRN as last resort


Consider restraints if remains uncooperative after pain is controlled and haldol





(3) Fracture, tibia and fibula, shaft


ICD Codes:  S82.209A - Unspecified fracture of shaft of unspecified tibia, 

initial encounter for closed fracture; S82.409A - Unspecified fracture of shaft 

of unspecified fibula, initial encounter for closed fracture


Status:  Acute


Plan:  -Orthopedics consult (Dr Velásquez) 


ortho has signed off


-Morphine 2mg IV q3h PRN pain 4-10


-Morphine 4mg IV q3h PRN breakthru





(4) Pulmonary edema


ICD Codes:  J81.1 - Chronic pulmonary edema


Status:  Resolved


Plan:  Chest xray on 2/24 concerning for pulmonary edema.


Careful with IV fluids, however patient is not eating yet


Sit upright in bed


Consider lasix and repeat chest xray if SOB





(5) Atrial fibrillation with RVR


ICD Codes:  I48.91 - Unspecified atrial fibrillation


Status:  Acute


Plan:  -Cardiology consult--appreciate recs


-Pt stable on Diltiazem 30mg PO q6h 


-If rate not controlled, check to see when last morphine dose was given--keep 

comfortable/give pain meds as first line tx





(6) Dementia


ICD Codes:  F03.90 - Unspecified dementia without behavioral disturbance


Status:  Chronic


Plan:  Stable. Takes no medications at [facility]





(7) FEN/GI/PPx


Status:  Acute


Plan:  Fluids: D5 NS IVF @80ml/hr


Electrolytes: will monitor and replete as necessary


Nutrition: patient not currently eating


discussed with family re potential tube placement


GI: not indicated


PPx: SCD on RLE; lovenox 30 per ortho





CM to assist with eventual SNF


PT to eval and treat, was able to sit up by the side of bed with PT, but 

challenging as pt cannot readily follow commands








Problem Qualifiers





(1) Fracture, tibia and fibula, shaft:  


Qualified Codes:  S82.202A - Unspecified fracture of shaft of left tibia, 

initial encounter for closed fracture; S82.402A - Unspecified fracture of shaft 

of left fibula, initial encounter for closed fracture


(2) Pulmonary edema:  


Qualified Codes:  J81.0 - Acute pulmonary edema


(3) Dementia:  


Qualified Codes:  F03.90 - Unspecified dementia without behavioral disturbance








Will Tuttle MD, R3 Feb 28, 2018 13:56

## 2018-02-28 NOTE — HHI.GIFU
Subjective


Remarks


Patient laying in bed, still confused, seems to be comfortable





Objective


Vitals I&O





Vital Signs








  Date Time  Temp Pulse Resp B/P (MAP) Pulse Ox O2 Delivery O2 Flow Rate FiO2


 


2/28/18 09:55      Nasal Cannula 3.00 


 


2/28/18 07:44  82      


 


2/28/18 04:00 98.2 89 18 140/73 (95) 97   


 


2/28/18 03:48  98      


 


2/28/18 00:03  104      


 


2/28/18 00:00 98.6 79 19 124/64 (84) 96   


 


2/27/18 20:15      Nasal Cannula 3.00 


 


2/27/18 20:02  94      


 


2/27/18 20:00 98.0 107 19 128/81 (97) 94   


 


2/27/18 17:22     93 Nasal Cannula 3.00 


 


2/27/18 16:00 97.6 128 18 129/61 (83) 97   


 


2/27/18 16:00      Nasal Cannula 3.00 


 


2/27/18 15:41  111      














I/O      


 


 2/27/18 2/27/18 2/27/18 2/28/18 2/28/18 2/28/18





 07:00 15:00 23:00 07:00 15:00 23:00


 


Intake Total 718 ml  240 ml 1000 ml  


 


Output Total 1200 ml  240 ml   


 


Balance -482 ml  0 ml 1000 ml  


 


      


 


Intake Oral 200 ml  240 ml 0 ml  


 


IV Total 518 ml   1000 ml  


 


Output Urine Total 1200 ml  240 ml   


 


# Voids   1 6  


 


# Bowel Movements   1 1  








Laboratory





Laboratory Tests








Test


  2/28/18


07:20


 


White Blood Count 7.0 


 


Red Blood Count 3.67 


 


Hemoglobin 9.8 


 


Hematocrit 30.1 


 


Mean Corpuscular Volume 81.9 


 


Mean Corpuscular Hemoglobin 26.8 


 


Mean Corpuscular Hemoglobin


Concent 32.6 


 


 


Red Cell Distribution Width 16.6 


 


Platelet Count 446 


 


Mean Platelet Volume 8.7 


 


Neutrophils (%) (Auto) 65.8 


 


Lymphocytes (%) (Auto) 18.9 


 


Monocytes (%) (Auto) 8.5 


 


Eosinophils (%) (Auto) 5.3 


 


Basophils (%) (Auto) 1.5 


 


Neutrophils # (Auto) 4.6 


 


Lymphocytes # (Auto) 1.3 


 


Monocytes # (Auto) 0.6 


 


Eosinophils # (Auto) 0.4 


 


Basophils # (Auto) 0.1 


 


CBC Comment DIFF FINAL 


 


Differential Comment  


 


Blood Urea Nitrogen 4 


 


Creatinine 0.44 


 


Random Glucose 103 


 


Calcium Level 8.5 


 


Sodium Level 142 


 


Potassium Level 3.4 


 


Chloride Level 108 


 


Carbon Dioxide Level 27.6 


 


Anion Gap 6 


 


Estimat Glomerular Filtration


Rate 137 


 














 Date/Time


Source Procedure


Growth Status


 


 


 2/24/18 12:30


Urine Clean Catch Urine Culture - Final


NO GROWTH IN 48 HOURS. Complete








Physical Exam


HEENT: Pupils round and reactive to light; normocephalic; atraumatic; no 

jaundice.  Throat is clear.


NECK: Neck is supple, no JVD, no lymphadenopathy.


CHEST:  Chest is clear to auscultation and percussion.


CARDIAC:  Regular rate and rhythm with no murmur gallop or rubs.


ABDOMEN:  Soft, nondistended, nontender; no hepatosplenomegaly; bowel sounds 

are present in all four quadrants.


EXTREMITIES: No clubbing, cyanosis, or edema.


SKIN:  Normal; no rash; no jaundice.


CNS:  No focal deficits; awake but not oriented.





Assessment and Plan


Plan


Patient was seen and examined, patient has dysphagia and had EGD with PEG tube 

today





IMPRESSION:


Mild gastritis biopsy from the antrum


20 [place] Microvasive PEG tube was placed with a pull technique without any 

immediate complication





PLAN:


Await biopsy results


Nothing by mouth for 6 hour if site okay may use the PEG tube for feeding and 

medication


If biopsies come back with gastritis patient can have H2 blocker











Heather Murphy MD Feb 28, 2018 15:24

## 2018-03-01 VITALS
RESPIRATION RATE: 18 BRPM | HEART RATE: 104 BPM | DIASTOLIC BLOOD PRESSURE: 77 MMHG | TEMPERATURE: 97.8 F | OXYGEN SATURATION: 96 % | SYSTOLIC BLOOD PRESSURE: 132 MMHG

## 2018-03-01 VITALS
OXYGEN SATURATION: 95 % | RESPIRATION RATE: 18 BRPM | TEMPERATURE: 98.6 F | DIASTOLIC BLOOD PRESSURE: 70 MMHG | SYSTOLIC BLOOD PRESSURE: 120 MMHG | HEART RATE: 129 BPM

## 2018-03-01 VITALS
SYSTOLIC BLOOD PRESSURE: 119 MMHG | TEMPERATURE: 97.3 F | RESPIRATION RATE: 20 BRPM | DIASTOLIC BLOOD PRESSURE: 73 MMHG | HEART RATE: 95 BPM | OXYGEN SATURATION: 94 %

## 2018-03-01 VITALS
HEART RATE: 82 BPM | TEMPERATURE: 97.6 F | OXYGEN SATURATION: 94 % | DIASTOLIC BLOOD PRESSURE: 67 MMHG | SYSTOLIC BLOOD PRESSURE: 135 MMHG | RESPIRATION RATE: 18 BRPM

## 2018-03-01 VITALS
DIASTOLIC BLOOD PRESSURE: 75 MMHG | OXYGEN SATURATION: 98 % | RESPIRATION RATE: 17 BRPM | TEMPERATURE: 98.7 F | HEART RATE: 90 BPM | SYSTOLIC BLOOD PRESSURE: 116 MMHG

## 2018-03-01 VITALS
RESPIRATION RATE: 20 BRPM | DIASTOLIC BLOOD PRESSURE: 60 MMHG | HEART RATE: 107 BPM | TEMPERATURE: 98 F | SYSTOLIC BLOOD PRESSURE: 123 MMHG | OXYGEN SATURATION: 93 %

## 2018-03-01 VITALS — HEART RATE: 83 BPM

## 2018-03-01 VITALS — HEART RATE: 87 BPM

## 2018-03-01 LAB
BASOPHILS # BLD AUTO: 0.1 TH/MM3 (ref 0–0.2)
BASOPHILS NFR BLD: 0.9 % (ref 0–2)
BUN SERPL-MCNC: 6 MG/DL (ref 7–18)
CALCIUM SERPL-MCNC: 8.4 MG/DL (ref 8.5–10.1)
CHLORIDE SERPL-SCNC: 103 MEQ/L (ref 98–107)
CREAT SERPL-MCNC: 0.36 MG/DL (ref 0.5–1)
EOSINOPHIL # BLD: 0.2 TH/MM3 (ref 0–0.4)
EOSINOPHIL NFR BLD: 2.2 % (ref 0–4)
ERYTHROCYTE [DISTWIDTH] IN BLOOD BY AUTOMATED COUNT: 16.7 % (ref 11.6–17.2)
GFR SERPLBLD BASED ON 1.73 SQ M-ARVRAT: 173 ML/MIN (ref 89–?)
GLUCOSE SERPL-MCNC: 89 MG/DL (ref 74–106)
HCO3 BLD-SCNC: 27.7 MEQ/L (ref 21–32)
HCT VFR BLD CALC: 28 % (ref 35–46)
HGB BLD-MCNC: 9.3 GM/DL (ref 11.6–15.3)
LYMPHOCYTES # BLD AUTO: 1.3 TH/MM3 (ref 1–4.8)
LYMPHOCYTES NFR BLD AUTO: 13.5 % (ref 9–44)
MCH RBC QN AUTO: 27.1 PG (ref 27–34)
MCHC RBC AUTO-ENTMCNC: 33.3 % (ref 32–36)
MCV RBC AUTO: 81.4 FL (ref 80–100)
MONOCYTE #: 0.6 TH/MM3 (ref 0–0.9)
MONOCYTES NFR BLD: 5.7 % (ref 0–8)
NEUTROPHILS # BLD AUTO: 7.4 TH/MM3 (ref 1.8–7.7)
NEUTROPHILS NFR BLD AUTO: 77.7 % (ref 16–70)
PLATELET # BLD: 476 TH/MM3 (ref 150–450)
PMV BLD AUTO: 8.6 FL (ref 7–11)
RBC # BLD AUTO: 3.44 MIL/MM3 (ref 4–5.3)
SODIUM SERPL-SCNC: 138 MEQ/L (ref 136–145)
WBC # BLD AUTO: 9.6 TH/MM3 (ref 4–11)

## 2018-03-01 RX ADMIN — OXYCODONE HYDROCHLORIDE AND ACETAMINOPHEN SCH MG: 500 TABLET ORAL at 10:17

## 2018-03-01 RX ADMIN — MAGNESIUM SULFATE HEPTAHYDRATE SCH MLS/HR: 500 INJECTION, SOLUTION INTRAMUSCULAR; INTRAVENOUS at 20:33

## 2018-03-01 RX ADMIN — ENOXAPARIN SODIUM SCH MG: 30 INJECTION SUBCUTANEOUS at 10:16

## 2018-03-01 RX ADMIN — DILTIAZEM HYDROCHLORIDE SCH MG: 30 TABLET, FILM COATED ORAL at 20:32

## 2018-03-01 RX ADMIN — CALCIUM CARBONATE-CHOLECALCIFEROL TAB 250 MG-125 UNIT SCH MG: 250-125 TAB at 13:00

## 2018-03-01 RX ADMIN — Medication SCH ML: at 10:17

## 2018-03-01 RX ADMIN — DILTIAZEM HYDROCHLORIDE SCH MG: 30 TABLET, FILM COATED ORAL at 15:48

## 2018-03-01 RX ADMIN — MORPHINE SULFATE SCH MG: 2 INJECTION, SOLUTION INTRAMUSCULAR; INTRAVENOUS at 13:02

## 2018-03-01 RX ADMIN — STANDARDIZED SENNA CONCENTRATE AND DOCUSATE SODIUM SCH TAB: 8.6; 5 TABLET, FILM COATED ORAL at 10:16

## 2018-03-01 RX ADMIN — VITAMIN D, TAB 1000IU (100/BT) SCH UNITS: 25 TAB at 10:17

## 2018-03-01 RX ADMIN — MORPHINE SULFATE SCH MG: 2 INJECTION, SOLUTION INTRAMUSCULAR; INTRAVENOUS at 04:16

## 2018-03-01 RX ADMIN — STANDARDIZED SENNA CONCENTRATE AND DOCUSATE SODIUM SCH TAB: 8.6; 5 TABLET, FILM COATED ORAL at 20:33

## 2018-03-01 RX ADMIN — MORPHINE SULFATE SCH MG: 2 INJECTION, SOLUTION INTRAMUSCULAR; INTRAVENOUS at 08:00

## 2018-03-01 RX ADMIN — DILTIAZEM HYDROCHLORIDE SCH MG: 30 TABLET, FILM COATED ORAL at 05:03

## 2018-03-01 RX ADMIN — DILTIAZEM HYDROCHLORIDE SCH MG: 30 TABLET, FILM COATED ORAL at 10:17

## 2018-03-01 RX ADMIN — CALCIUM CARBONATE-CHOLECALCIFEROL TAB 250 MG-125 UNIT SCH MG: 250-125 TAB at 10:16

## 2018-03-01 RX ADMIN — HYDROCODONE BITARTRATE AND ACETAMINOPHEN SCH TAB: 5; 325 TABLET ORAL at 15:51

## 2018-03-01 RX ADMIN — Medication SCH ML: at 20:33

## 2018-03-01 RX ADMIN — ASPIRIN SCH MG: 81 TABLET ORAL at 10:16

## 2018-03-01 RX ADMIN — HYDROCODONE BITARTRATE AND ACETAMINOPHEN SCH TAB: 5; 325 TABLET ORAL at 20:33

## 2018-03-01 RX ADMIN — SODIUM CHLORIDE PRN MLS/HR: 900 INJECTION, SOLUTION INTRAVENOUS at 04:36

## 2018-03-01 NOTE — PD.CARD.PN
Subjective


Subjective Remarks


awake in nad





Objective


Medications





Current Medications








 Medications


  (Trade)  Dose


 Ordered  Sig/César


 Route  Start Time


 Stop Time Status Last Admin


 


  (NS Flush)  2 ml  UNSCH  PRN


 IV FLUSH  2/22/18 13:15


    2/28/18 08:40


 


 


  (NS Flush)  2 ml  BID


 IV FLUSH  2/22/18 21:00


    3/1/18 10:17


 


 


  (Tylenol)  650 mg  Q4H  PRN


 PO  2/22/18 13:15


     


 


 


  (Narcan Inj)  0.4 mg  UNSCH  PRN


 IV PUSH  2/22/18 13:15


     


 


 


  (Halina-Colace)  1 tab  BID


 PO  2/22/18 21:00


    3/1/18 10:16


 


 


  (Milk Of


 Magnesia Liq)  30 ml  Q12H  PRN


 PO  2/22/18 13:15


     


 


 


  (Senokot)  17.2 mg  Q12H  PRN


 PO  2/22/18 13:15


     


 


 


  (Dulcolax Supp)  10 mg  DAILY  PRN


 RECTAL  2/22/18 13:15


     


 


 


  (Lactulose Liq)  30 ml  DAILY  PRN


 PO  2/22/18 13:15


     


 


 


  (Lovenox Inj)  30 mg  Q24H


 SQ  2/24/18 09:30


    3/1/18 10:16


 


 


  (Oscal-D 250-125)  250 mg  TID


 PO  2/23/18 13:00


    3/1/18 13:00


 


 


  (Benadryl)  25 mg  Q6H  PRN


 PO  2/23/18 10:15


     


 


 


  (Drisdol)  50,000 units  Q7D


 PO  2/23/18 11:00


     


 


 


  (Vitamin D3)  1,000 units  DAILY


 PO  2/24/18 09:00


    3/1/18 10:17


 


 


  (Vitamin C)  1,000 mg  DAILY


 PO  2/24/18 09:00


    3/1/18 10:17


 


 


  (Morphine Inj)  4 mg  Q3H  PRN


 IV PUSH  2/24/18 13:45


    2/26/18 11:52


 


 


  (Haldol Inj)  0.5 mg  Q6HR  PRN


 IV PUSH  2/24/18 12:15


    2/24/18 20:12


 


 


 Dextrose/Sodium


 Chloride  1,000 ml @ 


 80 mls/hr  [id number]


 IV  2/25/18 13:00


   Future hold 2/28/18 02:36


 


 


  (Ecotrin Ec)  81 mg  DAILY


 PO  2/28/18 09:00


    3/1/18 10:16


 


 


 Cefazolin Sodium


 1000 mg/Sodium


 Chloride  100 ml @ 


 200 mls/hr  ON  CALL


 IV  2/27/18 16:45


 3/2/18 16:44   


 


 


 Lactated Ringer's  1,000 ml @ 


 30 mls/hr  Q24H PRN


 IV  2/28/18 06:00


 3/3/18 05:59  2/28/18 08:40


 


 


 Sodium Chloride  500 ml @ 


 30 mls/hr  [id number] PRN


 IV  2/28/18 06:00


 3/3/18 05:59   


 


 


  (Betadine 5%


 Antisepsis Kit)  1 applic  ON CALL  PRN


 EACH NARE  2/28/18 06:00


 3/3/18 05:59   


 


 


  (Chlorhexidine


 2% Cloth)  3 pack  ON CALL  PRN


 TOPICAL  2/28/18 06:00


 3/3/18 05:59   


 


 


  (Cardizem)  30 mg  Q6H


 PO  3/1/18 09:00


    3/1/18 15:48


 


 


  (Norco  5-325 Mg)  1 tab  Q6H


 PEG  3/1/18 16:00


    3/1/18 15:51


 


 


 Diltiazem HCl 125


 mg/Sodium Chloride  125 ml @ 5


 mls/hr  TITRATE  PRN


 IV  3/1/18 17:00


     


 








Vital Signs / I&O





Vital Signs








  Date Time  Temp Pulse Resp B/P (MAP) Pulse Ox O2 Delivery O2 Flow Rate FiO2


 


3/1/18 16:00 98.6 129 18 120/70 (87) 95   


 


3/1/18 12:35 97.8 104 18 132/77 (95) 96   


 


3/1/18 12:00  87      


 


3/1/18 08:06  83      


 


3/1/18 08:00      Nasal Cannula 3.00 


 


3/1/18 08:00 97.6 82 18 135/67 (89) 94   


 


3/1/18 04:36  98  126/70    


 


3/1/18 04:00      Nasal Cannula 3.00 


 


3/1/18 04:00  95      


 


3/1/18 04:00 97.3 100 20 119/73 (88) 94   


 


3/1/18 00:00 98.0 107 20 123/60 (81) 93   


 


3/1/18 00:00      Nasal Cannula 3.00 


 


3/1/18 00:00  100      


 


2/28/18 21:40     95 Nasal Cannula 3.00 


 


2/28/18 20:30     96 Nasal Cannula 3.00 


 


2/28/18 20:00  112      


 


2/28/18 20:00 97.8 92 20 125/86 (99) 99   


 


2/28/18 20:00      Nasal Cannula 3.00 


 


2/28/18 18:36  121  164/99    














I/O      


 


 2/28/18 2/28/18 2/28/18 3/1/18 3/1/18 3/1/18





 07:00 15:00 23:00 07:00 15:00 23:00


 


Intake Total 1000 ml  420 ml 125 ml  


 


Balance 1000 ml  420 ml 125 ml  


 


      


 


Intake Oral 0 ml  120 ml   


 


IV Total 1000 ml   125 ml  


 


Other   300 ml   


 


# Voids 6  1 1 2 


 


# Bowel Movements 1   1 1 








Physical Exam


GENERAL: 


SKIN: Warm and dry.


HEAD: Normocephalic.


EYES: No scleral icterus. No injection or drainage. 


NECK: Supple, trachea midline. No JVD or lymphadenopathy.


CARDIOVASCULAR: Regular rate and rhythm without murmurs, gallops, or rubs. 


RESPIRATORY: Breath sounds equal bilaterally. No accessory muscle use.


GASTROINTESTINAL: Abdomen soft, non-tender, nondistended. 


MUSCULOSKELETAL: No cyanosis, or edema. 


BACK: Nontender without obvious deformity. No CVA tenderness.





Laboratory





Laboratory Tests








Test


  3/1/18


05:30


 


White Blood Count 9.6 TH/MM3 


 


Red Blood Count 3.44 MIL/MM3 


 


Hemoglobin 9.3 GM/DL 


 


Hematocrit 28.0 % 


 


Mean Corpuscular Volume 81.4 FL 


 


Mean Corpuscular Hemoglobin 27.1 PG 


 


Mean Corpuscular Hemoglobin


Concent 33.3 % 


 


 


Red Cell Distribution Width 16.7 % 


 


Platelet Count 476 TH/MM3 


 


Mean Platelet Volume 8.6 FL 


 


Neutrophils (%) (Auto) 77.7 % 


 


Lymphocytes (%) (Auto) 13.5 % 


 


Monocytes (%) (Auto) 5.7 % 


 


Eosinophils (%) (Auto) 2.2 % 


 


Basophils (%) (Auto) 0.9 % 


 


Neutrophils # (Auto) 7.4 TH/MM3 


 


Lymphocytes # (Auto) 1.3 TH/MM3 


 


Monocytes # (Auto) 0.6 TH/MM3 


 


Eosinophils # (Auto) 0.2 TH/MM3 


 


Basophils # (Auto) 0.1 TH/MM3 


 


CBC Comment DIFF FINAL 


 


Differential Comment  


 


Blood Urea Nitrogen 6 MG/DL 


 


Creatinine 0.36 MG/DL 


 


Random Glucose 89 MG/DL 


 


Calcium Level 8.4 MG/DL 


 


Sodium Level 138 MEQ/L 


 


Potassium Level 3.1 MEQ/L 


 


Chloride Level 103 MEQ/L 


 


Carbon Dioxide Level 27.7 MEQ/L 


 


Anion Gap 7 MEQ/L 


 


Estimat Glomerular Filtration


Rate 173 ML/MIN 


 











Assessment and Plan


Problem List:  


(1) New onset a-fib


ICD Codes:  I48.91 - Unspecified atrial fibrillation


Status:  Acute


(2) Atrial fibrillation with RVR


ICD Codes:  I48.91 - Unspecified atrial fibrillation


Status:  Acute


(3) Dementia


ICD Codes:  F03.90 - Unspecified dementia without behavioral disturbance


Status:  Chronic


(4) Fracture, tibia and fibula, shaft


ICD Codes:  S82.209A - Unspecified fracture of shaft of unspecified tibia, 

initial encounter for closed fracture; S82.409A - Unspecified fracture of shaft 

of unspecified fibula, initial encounter for closed fracture


Status:  Acute


Assessment and Plan


1.) Afib- rate controlled, pod # 6, ortho surgery, explained to daughter and 

son coumadin or noac indicated due to [id number] socre = 3, however bleeding 

and complication risk would be high due to fall risk, dementia and difficulty 

with compliance; they refuse coumadin or noac, request aspirin 81 mg qd 

understanding cva risk will be higher





Problem Qualifiers





(1) Dementia:  


Qualified Codes:  F03.90 - Unspecified dementia without behavioral disturbance


(2) Fracture, tibia and fibula, shaft:  


Qualified Codes:  S82.202A - Unspecified fracture of shaft of left tibia, 

initial encounter for closed fracture; S82.402A - Unspecified fracture of shaft 

of left fibula, initial encounter for closed fracture








Jayden Lama MD Mar 1, 2018 18:35

## 2018-03-01 NOTE — HHI.FPPN
Subjective


Remarks


Patient is asleep in bed. PEG tube placed yesterday.


Nursing reports no concerns overnight with the patient. She would like an order 

for dressing changes. No problems with the PEG tube. 


 (Will Tuttle MD, R3)





Objective


Vitals





Vital Signs








  Date Time  Temp Pulse Resp B/P (MAP) Pulse Ox O2 Delivery O2 Flow Rate FiO2


 


3/1/18 04:36  98  126/70    


 


3/1/18 04:00      Nasal Cannula 3.00 


 


3/1/18 04:00  95      


 


3/1/18 04:00 97.3 100 20 119/73 (88) 94   


 


3/1/18 00:00 98.0 107 20 123/60 (81) 93   


 


3/1/18 00:00      Nasal Cannula 3.00 


 


3/1/18 00:00  100      


 


2/28/18 21:40     95 Nasal Cannula 3.00 


 


2/28/18 20:30     96 Nasal Cannula 3.00 


 


2/28/18 20:00  112      


 


2/28/18 20:00 97.8 92 20 125/86 (99) 99   


 


2/28/18 20:00      Nasal Cannula 3.00 


 


2/28/18 18:36  121  164/99    


 


2/28/18 16:00 97.5 131 19 164/99 (120) 94   


 


2/28/18 15:44  120 18 131/69 (89) 96 Nasal Cannula 6 


 


2/28/18 09:55      Nasal Cannula 3.00 














I/O      


 


 2/28/18 2/28/18 2/28/18 3/1/18 3/1/18 3/1/18





 07:00 15:00 23:00 07:00 15:00 23:00


 


Intake Total 1000 ml  420 ml 125 ml  


 


Balance 1000 ml  420 ml 125 ml  


 


      


 


Intake Oral 0 ml  120 ml   


 


IV Total 1000 ml   125 ml  


 


Other   300 ml   


 


# Voids 6  1 1  


 


# Bowel Movements 1   1  








 (Will Tuttle MD, R3)


Result Diagram:  


3/1/18 0530                                                                    

            3/1/18 0530





Objective Remarks


GENERAL: This is a thin, elderly female asleep in bed. Soft restraints in place.


SKIN: cool and dry 


HEAD: Atraumatic. Normocephalic. 


EYES:  No scleral icterus. No injection or drainage. 


ENT: Nose without drainage.


NECK: Trachea midline. No JVD or lymphadenopathy.  no meningeal signs.


CARDIOVASCULAR: irregular rate, not tachy, without murmur, gallop, or rub. 


RESPIRATORY: Clear to auscultation. Breath sounds equal bilaterally. No wheezes

, rales, or rhonchi.  


GASTROINTESTINAL: Abdomen soft, non-tender, nondistended. No hepato-splenomegaly

, or palpable masses. No guarding. PEG tube placed in LUQ. Dressing clean and 

intact.


MUSCULOSKELETAL: Extremities without clubbing, cyanosis, or edema. No edema of 

RLE. LLE in boot and ace bandage to the knee. No right calf tenderness. SCD on 

RLE.


NEUROLOGICAL: Motor and sensory grossly within normal limits. 


 (Will Tuttle MD, R3)





A/P


Assessment and Plan


83YO female w/PMHx dementia presents from [facility] with comminuted fractures of 

distal 1/3 of left tibia and fibula and new onset atrial fibrillation with RVR. 

She is POD#6 Left Distal 1/3 Tibia and fibula shaft fxs s/p IMN by Dr Velásquez on 

2/23/18 and POD #1 PEG tube placement by Dr. Murphy on 2/28/18.


Discharge Planning


Pending rate controlled, tolerating tube feedings.


grand[facility] are Enriqueta and [phone]


 (Will Tuttle MD, R3)


Attending Attestation


Patient seen and examined.  Case reviewed and discussed with the resident team.

  Agree with plan of care as discussed with me and documented in the resident 

note.


she will be able to go to a SNF with her PEG. Her family anticipates she will 

rehab and improve but her dementia is profound and not improving


 (Jessica Quarles MD)


Problem List:  


(1) Atrial fibrillation with RVR


ICD Codes:  I48.91 - Unspecified atrial fibrillation


Status:  Acute


Plan:  -Cardiology consult--appreciate recs


-Pt started on Diltiazem drip for surgery yesterday and went into Afib with RVR 

after surgery


-Pt currently stable on Diltiazem drip.


-Will transition IV Diltiazem to PO 30mg PO q6h 


-Per Cardiology, pt started on Aspirin 81mg qd for anticoagulation


-If rate not controlled, check to see when last morphine dose was given--keep 

comfortable/give pain meds as first line tx





(2) Failure to thrive in adult


ICD Codes:  R62.7 - Adult failure to thrive


Plan:  Pt with inadequate PO and concern by family about continued ability to 

be able to take adequate caloric intake. Pt is frail with recent osteoporotic 

fracture of left tibia and fibula from a fall, and pt cannot walk anymore. 

Discussion with daughter on 2/27 revealed their concern that she is not able to 

take adequate nutrition on her own. Daughter has verbally stated she would 

consent to PEG tube placement to ensure her mother gets adequate caloric 

intake. 





POD #1 of PEG tube placement. Nutrition was consulted and recommended Jevity 

1.5 @ 50mls/hr x 24hr. Will begin tub feedings today.





(3) Delirium


ICD Codes:  R41.0 - Disorientation, unspecified


Status:  Acute


Plan:  Nonpharmacologic treatment: Maintain orientation if possible, minimize 

sleep deprivation, maintain mobility (out of bed to chair with PT) if and when 

able


Consider pharmacologic treatment if needed: Haloperidol when necessary





1. Keep pain controlled with morphine; pt gets more agitated when pain 

inadequately controlled


2. Can use Haldol 0.5mg IV PRN as last resort


Consider restraints if remains uncooperative after pain is controlled and haldol





(4) Gastritis


ICD Codes:  K29.70 - Gastritis, unspecified, without bleeding


Status:  Acute


Plan:  EGD on 2/28 revealed mild gastritis


Biopsy taken


If biopsy comes back positive, give patient H2 blocker per GI recs





(5) Fracture, tibia and fibula, shaft


ICD Codes:  S82.209A - Unspecified fracture of shaft of unspecified tibia, 

initial encounter for closed fracture; S82.409A - Unspecified fracture of shaft 

of unspecified fibula, initial encounter for closed fracture


Status:  Acute


Plan:  -Orthopedics consult (Dr Velásquez) 


ortho has signed off


-Morphine 2mg IV q4 hours scheduled (hold for sedation)


-Morphine 4mg IV q3h PRN breakthru





(6) Pulmonary edema


ICD Codes:  J81.1 - Chronic pulmonary edema


Status:  Resolved


Plan:  Chest xray on 2/24 concerning for pulmonary edema.


Careful with IV fluids, however patient is not eating yet


Sit upright in bed


Consider lasix and repeat chest xray if SOB





(7) Dementia


ICD Codes:  F03.90 - Unspecified dementia without behavioral disturbance


Status:  Chronic


Plan:  Stable. Takes no medications at [facility]





(8) FEN/GI/PPx


Status:  Acute


Plan:  Fluids: D5 NS IVF @80ml/hr


Electrolytes: will monitor and replete as necessary


Nutrition: start Jevity 1.5 @ 50ml/hr via PEG tube


GI: not indicated


PPx: SCD on RLE; lovenox 30 per ortho





CM to assist with eventual SNF


PT to eval and treat, was able to sit up by the side of bed with PT, but 

challenging as pt cannot readily follow commands


 (Will Tuttle MD, R3)





Problem Qualifiers





(1) Gastritis:  


Qualified Codes:  K29.70 - Gastritis, unspecified, without bleeding


(2) Fracture, tibia and fibula, shaft:  


Qualified Codes:  S82.202A - Unspecified fracture of shaft of left tibia, 

initial encounter for closed fracture; S82.402A - Unspecified fracture of shaft 

of left fibula, initial encounter for closed fracture


(3) Pulmonary edema:  


Qualified Codes:  J81.0 - Acute pulmonary edema


(4) Dementia:  


Qualified Codes:  F03.90 - Unspecified dementia without behavioral disturbance








Will Tuttle MD, R3 Mar 1, 2018 08:59


Jessica Quarles MD Mar 4, 2018 13:45

## 2018-03-01 NOTE — HHI.GIFU
Subjective


Remarks


Pt resting in bed in NAD, whispering nonsensically.  TF running 10ml/hr.


 (Rosey Goss)





Objective


Vitals I&O





Vital Signs








  Date Time  Temp Pulse Resp B/P (MAP) Pulse Ox O2 Delivery O2 Flow Rate FiO2


 


3/1/18 12:35 97.8 104 18 132/77 (95) 96   


 


3/1/18 12:00  87      


 


3/1/18 08:06  83      


 


3/1/18 08:00      Nasal Cannula 3.00 


 


3/1/18 08:00 97.6 82 18 135/67 (89) 94   


 


3/1/18 04:36  98  126/70    


 


3/1/18 04:00      Nasal Cannula 3.00 


 


3/1/18 04:00  95      


 


3/1/18 04:00 97.3 100 20 119/73 (88) 94   


 


3/1/18 00:00 98.0 107 20 123/60 (81) 93   


 


3/1/18 00:00      Nasal Cannula 3.00 


 


3/1/18 00:00  100      


 


2/28/18 21:40     95 Nasal Cannula 3.00 


 


2/28/18 20:30     96 Nasal Cannula 3.00 


 


2/28/18 20:00  112      


 


2/28/18 20:00 97.8 92 20 125/86 (99) 99   


 


2/28/18 20:00      Nasal Cannula 3.00 


 


2/28/18 18:36  121  164/99    


 


2/28/18 16:00 97.5 131 19 164/99 (120) 94   


 


2/28/18 15:44  120 18 131/69 (89) 96 Nasal Cannula 6 














I/O      


 


 2/28/18 2/28/18 2/28/18 3/1/18 3/1/18 3/1/18





 07:00 15:00 23:00 07:00 15:00 23:00


 


Intake Total 1000 ml  420 ml 125 ml  


 


Balance 1000 ml  420 ml 125 ml  


 


      


 


Intake Oral 0 ml  120 ml   


 


IV Total 1000 ml   125 ml  


 


Other   300 ml   


 


# Voids 6  1 1  


 


# Bowel Movements 1   1  








Laboratory





Laboratory Tests








Test


  2/28/18


18:10 3/1/18


05:30


 


Prothrombin Time 25.4  


 


Prothromb Time International


Ratio 2.5 


  


 


 


White Blood Count  9.6 


 


Red Blood Count  3.44 


 


Hemoglobin  9.3 


 


Hematocrit  28.0 


 


Mean Corpuscular Volume  81.4 


 


Mean Corpuscular Hemoglobin  27.1 


 


Mean Corpuscular Hemoglobin


Concent 


  33.3 


 


 


Red Cell Distribution Width  16.7 


 


Platelet Count  476 


 


Mean Platelet Volume  8.6 


 


Neutrophils (%) (Auto)  77.7 


 


Lymphocytes (%) (Auto)  13.5 


 


Monocytes (%) (Auto)  5.7 


 


Eosinophils (%) (Auto)  2.2 


 


Basophils (%) (Auto)  0.9 


 


Neutrophils # (Auto)  7.4 


 


Lymphocytes # (Auto)  1.3 


 


Monocytes # (Auto)  0.6 


 


Eosinophils # (Auto)  0.2 


 


Basophils # (Auto)  0.1 


 


CBC Comment  DIFF FINAL 


 


Differential Comment   


 


Blood Urea Nitrogen  6 


 


Creatinine  0.36 


 


Random Glucose  89 


 


Calcium Level  8.4 


 


Sodium Level  138 


 


Potassium Level  3.1 


 


Chloride Level  103 


 


Carbon Dioxide Level  27.7 


 


Anion Gap  7 


 


Estimat Glomerular Filtration


Rate 


  173 


 














 Date/Time


Source Procedure


Growth Status


 


 


 2/24/18 12:30


Urine Clean Catch Urine Culture - Final


NO GROWTH IN 48 HOURS. Complete








Imaging





Last Impressions








Chest X-Ray 2/24/18 0000 Signed





Impressions: 





 Service Date/Time:  Saturday, February 24, 2018 11:29 - CONCLUSION:  





 Interstitial pulmonary edema. This appears to be increased compared to the 

prior 





 exam.     Petr Salgado MD 


 


Tibia/Fibula X-Ray 2/23/18 0000 Signed





Impressions: 





 Service Date/Time:  Friday, February 23, 2018 09:57 - CONCLUSION:  1. Status 





 post left tibial IM braydon fixation, as above.     Bennie Barger MD 


 


Pelvis X-Ray 2/22/18 0000 Signed





Impressions: 





 Service Date/Time:  Thursday, February 22, 2018 10:21 - CONCLUSION:  The bony 





 pelvic ring is grossly intact.  Symmetric appearance of bilateral total hip 





 arthroplasty.     Dylan Candelaria MD 


 


Femur X-Ray 2/22/18 0000 Signed





Impressions: 





 Service Date/Time:  Thursday, February 22, 2018 10:23 - CONCLUSION:  1. No 





 fracture seen. 2. Possible knee effusion.     Dylan Candelaria MD 








Physical Exam


HEENT:  normocephalic; atraumatic; no jaundice.  


CHEST:  CTA


CARDIAC:  RRR


ABDOMEN:  Soft, nondistended, nontender; no hepatosplenomegaly; bowel sounds 

are present in all four quadrants. peg site clean and dry


EXTREMITIES: No clubbing, cyanosis, or edema.


SKIN:  Normal; no rash; no jaundice.


CNS:  confused


 (Rosey Goss)





Assessment and Plan


Plan


ASSESSMENT


- decreased PO intake -  pt with dementia, s/p fall and repair LLE fractures. 

poor PO intake.  attempted to call


  [facility] [phone] but no answer.  per attending note family has 

agreed to PEG tube placement


- AF, cardiology following, on ASA per family





 2/28/18  Patient was seen and examined, patient has dysphagia and had EGD with 

PEG tube today





IMPRESSION:


Mild gastritis biopsy from the antrum


20 French Microvasive PEG tube was placed with a pull technique without any 

immediate complication





3/1/18   TF initiated, running 10ml/hr.  pt seems to be tolerating so far.  peg 

site looks good.





PLAN:


Await biopsy results


continue TF and increase gradually to goal rate


If biopsies come back with gastritis patient can have H2 blocker


GI will sign off, please reconsult if needed





pt seen by myself and Dr Murphy and this note is on his behalf


 (Rosey Goss)


Plan


Patient was seen and examined, agree with above-noted, start tube feeding as 

needed, we will sign off


 (Heather Murphy MD)











Rosey Goss Mar 1, 2018 14:11


Heather Murphy MD Mar 1, 2018 17:44

## 2018-03-02 VITALS
OXYGEN SATURATION: 90 % | HEART RATE: 68 BPM | SYSTOLIC BLOOD PRESSURE: 112 MMHG | TEMPERATURE: 98.1 F | DIASTOLIC BLOOD PRESSURE: 72 MMHG | RESPIRATION RATE: 16 BRPM

## 2018-03-02 VITALS
DIASTOLIC BLOOD PRESSURE: 51 MMHG | SYSTOLIC BLOOD PRESSURE: 87 MMHG | TEMPERATURE: 97.3 F | RESPIRATION RATE: 20 BRPM | OXYGEN SATURATION: 98 % | HEART RATE: 94 BPM

## 2018-03-02 VITALS
OXYGEN SATURATION: 94 % | SYSTOLIC BLOOD PRESSURE: 110 MMHG | TEMPERATURE: 98.4 F | HEART RATE: 93 BPM | DIASTOLIC BLOOD PRESSURE: 75 MMHG | RESPIRATION RATE: 16 BRPM

## 2018-03-02 VITALS
HEART RATE: 81 BPM | SYSTOLIC BLOOD PRESSURE: 115 MMHG | OXYGEN SATURATION: 98 % | TEMPERATURE: 97 F | DIASTOLIC BLOOD PRESSURE: 72 MMHG | RESPIRATION RATE: 20 BRPM

## 2018-03-02 VITALS — HEART RATE: 105 BPM

## 2018-03-02 VITALS — OXYGEN SATURATION: 90 %

## 2018-03-02 LAB
BUN SERPL-MCNC: 11 MG/DL (ref 7–18)
CALCIUM SERPL-MCNC: 8.4 MG/DL (ref 8.5–10.1)
CHLORIDE SERPL-SCNC: 103 MEQ/L (ref 98–107)
CREAT SERPL-MCNC: 0.49 MG/DL (ref 0.5–1)
ERYTHROCYTE [DISTWIDTH] IN BLOOD BY AUTOMATED COUNT: 16.8 % (ref 11.6–17.2)
GFR SERPLBLD BASED ON 1.73 SQ M-ARVRAT: 121 ML/MIN (ref 89–?)
GLUCOSE SERPL-MCNC: 134 MG/DL (ref 74–106)
HCO3 BLD-SCNC: 25.5 MEQ/L (ref 21–32)
HCT VFR BLD CALC: 29.8 % (ref 35–46)
HGB BLD-MCNC: 9.8 GM/DL (ref 11.6–15.3)
MCH RBC QN AUTO: 26.9 PG (ref 27–34)
MCHC RBC AUTO-ENTMCNC: 32.8 % (ref 32–36)
MCV RBC AUTO: 81.9 FL (ref 80–100)
PLATELET # BLD: 495 TH/MM3 (ref 150–450)
PMV BLD AUTO: 8.4 FL (ref 7–11)
RBC # BLD AUTO: 3.63 MIL/MM3 (ref 4–5.3)
SODIUM SERPL-SCNC: 138 MEQ/L (ref 136–145)
WBC # BLD AUTO: 11.9 TH/MM3 (ref 4–11)

## 2018-03-02 RX ADMIN — HYDROCODONE BITARTRATE AND ACETAMINOPHEN SCH TAB: 5; 325 TABLET ORAL at 16:04

## 2018-03-02 RX ADMIN — HYDROCODONE BITARTRATE AND ACETAMINOPHEN SCH TAB: 5; 325 TABLET ORAL at 03:57

## 2018-03-02 RX ADMIN — DILTIAZEM HYDROCHLORIDE SCH MG: 30 TABLET, FILM COATED ORAL at 10:34

## 2018-03-02 RX ADMIN — HYDROCODONE BITARTRATE AND ACETAMINOPHEN SCH TAB: 5; 325 TABLET ORAL at 10:35

## 2018-03-02 RX ADMIN — DILTIAZEM HYDROCHLORIDE SCH MG: 30 TABLET, FILM COATED ORAL at 16:04

## 2018-03-02 RX ADMIN — MAGNESIUM SULFATE HEPTAHYDRATE SCH MLS/HR: 500 INJECTION, SOLUTION INTRAMUSCULAR; INTRAVENOUS at 05:30

## 2018-03-02 RX ADMIN — DILTIAZEM HYDROCHLORIDE SCH MG: 30 TABLET, FILM COATED ORAL at 03:57

## 2018-03-02 RX ADMIN — ASPIRIN SCH MG: 81 TABLET ORAL at 10:34

## 2018-03-02 RX ADMIN — VITAMIN D, TAB 1000IU (100/BT) SCH UNITS: 25 TAB at 10:33

## 2018-03-02 RX ADMIN — CALCIUM CARBONATE-CHOLECALCIFEROL TAB 250 MG-125 UNIT SCH MG: 250-125 TAB at 10:34

## 2018-03-02 RX ADMIN — OXYCODONE HYDROCHLORIDE AND ACETAMINOPHEN SCH MG: 500 TABLET ORAL at 10:34

## 2018-03-02 RX ADMIN — STANDARDIZED SENNA CONCENTRATE AND DOCUSATE SODIUM SCH TAB: 8.6; 5 TABLET, FILM COATED ORAL at 10:35

## 2018-03-02 RX ADMIN — CALCIUM CARBONATE-CHOLECALCIFEROL TAB 250 MG-125 UNIT SCH MG: 250-125 TAB at 10:36

## 2018-03-02 RX ADMIN — ENOXAPARIN SODIUM SCH MG: 30 INJECTION SUBCUTANEOUS at 10:34

## 2018-03-02 RX ADMIN — CALCIUM CARBONATE-CHOLECALCIFEROL TAB 250 MG-125 UNIT SCH MG: 250-125 TAB at 13:00

## 2018-03-02 NOTE — PD.ORT.PN
Subjective


Subjective Remarks


Patient is confused in restraints





Objective


Vitals





Vital Signs








  Date Time  Temp Pulse Resp B/P (MAP) Pulse Ox O2 Delivery O2 Flow Rate FiO2


 


3/2/18 04:00      Nasal Cannula 3.00 


 


3/2/18 04:00 97.0 105 20 115/72 (86) 98   


 


3/2/18 04:00  81      


 


3/2/18 00:00 97.3 94 20 87/51 (63) 98   


 


3/2/18 00:00      Nasal Cannula 3.00 


 


3/2/18 00:00  92      


 


3/1/18 20:45      Nasal Cannula 3.00 


 


3/1/18 20:00 98.7 90 17 116/75 (89) 98   


 


3/1/18 20:00  86      


 


3/1/18 20:00      Nasal Cannula 3.00 


 


3/1/18 16:00 98.6 129 18 120/70 (87) 95   


 


3/1/18 12:35 97.8 104 18 132/77 (95) 96   


 


3/1/18 12:00  87      


 


3/1/18 08:06  83      


 


3/1/18 08:00      Nasal Cannula 3.00 


 


3/1/18 08:00 97.6 82 18 135/67 (89) 94   














I/O      


 


 3/1/18 3/1/18 3/1/18 3/2/18 3/2/18 3/2/18





 07:00 15:00 23:00 07:00 15:00 23:00


 


Intake Total 125 ml   1290 ml  


 


Output Total    200 ml  


 


Balance 125 ml   1090 ml  


 


      


 


IV Total 125 ml   760 ml  


 


Tube Feeding    530 ml  


 


Output Urine Total    200 ml  


 


# Voids 1 2    


 


# Bowel Movements 1 1  0  








Result Diagram:  


3/2/18 0554                                                                    

            3/2/18 0554





Imaging





Last 24 hours Impressions








Chest X-Ray 2/22/18 1006 Signed





Impressions: 





 Service Date/Time:  Thursday, February 22, 2018 10:40 - CONCLUSION:  1. No 





 infiltrates seen. 2. Cardiomegaly and fullness of the central bronchopulmonary 





 markings suggest pulmonary venous hypertension.     Dylan Candelaria MD 








Procedures


Left Distal 1/3 Tibia and fibula shaft fxs s/p IMN by Dr Velásquez on 2/23/18


Objective Remarks


LLE: dressings clean and dry. intact. nvi, stable capillary refill, 1+ pedal 

edema, no change.





Assessment & Plan


Problem List:  


(1) Fracture, tibia and fibula, shaft


ICD Codes:  S82.209A - Unspecified fracture of shaft of unspecified tibia, 

initial encounter for closed fracture; S82.409A - Unspecified fracture of shaft 

of unspecified fibula, initial encounter for closed fracture


Status:  Acute


Qualifiers:  


   Qualified Codes:  S82.202A - Unspecified fracture of shaft of left tibia, 

initial encounter for closed fracture; S82.402A - Unspecified fracture of shaft 

of left fibula, initial encounter for closed fracture


Assessment and Plan


1) Left Distal 1/3 Tibia and fibula shaft fxs s/p IMN by Dr Velásquez on 2/23/18 - 

POD 7


   -NWB LLE


   -daily dressing changes with Xeroform and Primapore. Discontinue Ace wraps 

to left lower extremity


   -CM for rehab placement


   -DVT prophylaxis


   -Scripts on chart


   - Discharge planning 


   -f/u with [facility] or [facility] in 2 weeks











Ryan Dumont Jr. Mar 2, 2018 07:39

## 2018-03-02 NOTE — HHI.FPPN
Subjective


Remarks


Nursing reports no events overnight.  She has not been aggressive or agitated.  

She has not been requiring any restraints or medication.  Patient unable to 

provide history.


 (Will Tuttle MD, R3)





Objective


Vitals





Vital Signs








  Date Time  Temp Pulse Resp B/P (MAP) Pulse Ox O2 Delivery O2 Flow Rate FiO2


 


3/2/18 12:00 98.4 93 16 110/75 (87) 94   


 


3/2/18 10:33     90   21


 


3/2/18 08:00      Nasal Cannula  


 


3/2/18 08:00 98.1 68 16 112/72 (85) 90   


 


3/2/18 07:53  105      


 


3/2/18 04:00      Nasal Cannula 3.00 


 


3/2/18 04:00 97.0 105 20 115/72 (86) 98   


 


3/2/18 04:00  81      


 


3/2/18 00:00 97.3 94 20 87/51 (63) 98   


 


3/2/18 00:00      Nasal Cannula 3.00 


 


3/2/18 00:00  92      


 


3/1/18 20:45      Nasal Cannula 3.00 


 


3/1/18 20:00 98.7 90 17 116/75 (89) 98   


 


3/1/18 20:00  86      


 


3/1/18 20:00      Nasal Cannula 3.00 


 


3/1/18 16:00 98.6 129 18 120/70 (87) 95   














I/O      


 


 3/1/18 3/1/18 3/1/18 3/2/18 3/2/18 3/2/18





 07:00 15:00 23:00 07:00 15:00 23:00


 


Intake Total 125 ml   1290 ml  


 


Output Total    200 ml  


 


Balance 125 ml   1090 ml  


 


      


 


IV Total 125 ml   760 ml  


 


Tube Feeding    530 ml  


 


Output Urine Total    200 ml  


 


# Voids 1 2    


 


# Bowel Movements 1 1  0  








 (Will Tuttle MD, R3)


Result Diagram:  


3/2/18 0554                                                                    

            3/2/18 0554





Objective Remarks


GENERAL: This is a thin, elderly female asleep in bed.  No acute distress


SKIN: cool and dry 


HEAD: Atraumatic. Normocephalic. 


EYES:  No scleral icterus. No injection or drainage. 


ENT: Nose without drainage.


NECK: Trachea midline. No JVD or lymphadenopathy.  no meningeal signs.


CARDIOVASCULAR: irregular rate, not tachy, without murmur, gallop, or rub. 


RESPIRATORY: Clear to auscultation. Breath sounds equal bilaterally. No wheezes

, rales, or rhonchi.  


GASTROINTESTINAL: Abdomen soft, non-tender, nondistended. No hepato-splenomegaly

, or palpable masses. No guarding. PEG tube placed in LUQ. Dressing clean and 

intact.


MUSCULOSKELETAL: Extremities without clubbing, cyanosis, or edema. No edema of 

RLE. LLE in boot and ace bandage to the knee. No right calf tenderness. SCD on 

RLE.


NEUROLOGICAL: Motor and sensory grossly within normal limits. 


 (Will Tuttle MD, R3)





A/P


Assessment and Plan


81YO female w/PMHx dementia presents from [facility] with comminuted fractures of 

distal 1/3 of left tibia and fibula and new onset atrial fibrillation with RVR. 

She is POD#6 Left Distal 1/3 Tibia and fibula shaft fxs s/p IMN by Dr Velásquez on 

2/23/18 and POD #1 PEG tube placement by Dr. Murphy on 2/28/18.


Discharge Planning


To skilled nursing facility.


grandchildren are Enriqueta and Cliff Sidhu is [facility] [phone]


 (Will Tuttle MD, R3)


Attending Attestation


Patient seen and examined.  Case reviewed and discussed with the resident team.

  Agree with plan of care as discussed with me and documented in the resident 

note.


adjusted free water for maintenance plus other tube feeds


 (Jessica Quarles MD)


Problem List:  


(1) Atrial fibrillation with RVR


ICD Codes:  I48.91 - Unspecified atrial fibrillation


Status:  Acute


Plan:  -Cardiology consult--appreciate recs


-Stable on Cardizem PO 30mg PO q6h 


-Per Cardiology, pt started on Aspirin 81mg qd for antiplatelet per family 

request.





(2) Failure to thrive in adult


ICD Codes:  R62.7 - Adult failure to thrive


Plan:  Pt with inadequate PO and concern by family about continued ability to 

be able to take adequate caloric intake. Pt is frail with recent osteoporotic 

fracture of left tibia and fibula from a fall, and pt cannot walk anymore. 

Discussion with daughter on 2/27 revealed their concern that she is not able to 

take adequate nutrition on her own. 





POD #2 of PEG tube placement. Nutrition was consulted and recommended Jevity 

1.5 @ 50mls/hr x 24hr. patient tolerating tube feeds





(3) Delirium


ICD Codes:  R41.0 - Disorientation, unspecified


Status:  Acute


Plan:  Nonpharmacologic treatment: Maintain orientation if possible, minimize 

sleep deprivation, maintain mobility (out of bed to chair with PT) if and when 

able


Consider pharmacologic treatment if needed: Haloperidol when necessary





1. Keep pain controlled with Norco; pt gets more agitated when pain 

inadequately controlled


2. Can use Haldol 0.5mg IV PRN as last resort


Consider restraints if remains uncooperative after pain is controlled and haldol





(4) Gastritis


ICD Codes:  K29.70 - Gastritis, unspecified, without bleeding


Status:  Acute


Plan:  EGD on 2/28 revealed mild gastritis


Biopsy taken


If biopsy comes back positive, give patient H2 blocker per GI recs





(5) Fracture, tibia and fibula, shaft


ICD Codes:  S82.209A - Unspecified fracture of shaft of unspecified tibia, 

initial encounter for closed fracture; S82.409A - Unspecified fracture of shaft 

of unspecified fibula, initial encounter for closed fracture


Status:  Acute


Plan:  -Orthopedics consult (Dr Velásquez) 


ortho has signed off


-Dressing changes per ortho


-Morphine 2mg IV q4 hours scheduled (hold for sedation)


-Morphine 4mg IV q3h PRN breakthru





(6) Pulmonary edema


ICD Codes:  J81.1 - Chronic pulmonary edema


Status:  Resolved


Plan:  Chest xray on 2/24 concerning for pulmonary edema.


Careful with IV fluids, however patient is not eating yet


Sit upright in bed


Consider lasix and repeat chest xray if SOB





(7) Dementia


ICD Codes:  F03.90 - Unspecified dementia without behavioral disturbance


Status:  Chronic


Plan:  Stable. Takes no medications at [facility]





(8) FEN/GI/PPx


Status:  Acute


Plan:  Fluids: Tolerating tube feeds.


Electrolytes: will monitor and replete as necessary


Nutrition: start Jevity 1.5 @ 50ml/hr via PEG tube


GI: not indicated


PPx: SCD on RLE; lovenox 30 per ortho





CM to assist with SNF


PT to eval and treat, 


 (Will Tuttle MD, R3)





Problem Qualifiers





(1) Gastritis:  


Qualified Codes:  K29.70 - Gastritis, unspecified, without bleeding


(2) Fracture, tibia and fibula, shaft:  


Qualified Codes:  S82.202A - Unspecified fracture of shaft of left tibia, 

initial encounter for closed fracture; S82.402A - Unspecified fracture of shaft 

of left fibula, initial encounter for closed fracture


(3) Pulmonary edema:  


Qualified Codes:  J81.0 - Acute pulmonary edema


(4) Dementia:  


Qualified Codes:  F03.90 - Unspecified dementia without behavioral disturbance








Will Tuttle MD, R3 Mar 2, 2018 14:21


Jessica Quarles MD Mar 4, 2018 13:46

## 2018-03-02 NOTE — HHI.DCPOC
Discharge Care Plan


Diagnosis:  


(1) Atrial fibrillation with RVR


(2) Fracture, tibia and fibula, shaft


(3) Delirium


(4) Failure to thrive in adult


Goals to Promote Your Health


* To prevent worsening of your condition and complications


* To maintain your health at the optimal level


Directions to Meet Your Goals


*** Take your medications as prescribed


*** Follow your dietary instruction


*** Follow activity as directed








*** Keep your appointments as scheduled


*** Take your immunizations and boosters as scheduled


*** If your symptoms worsen call your PCP, if no PCP go to Urgent Care Center 

or Emergency Room***


*** Smoking is Dangerous to Your Health. Avoid second hand smoke***


***Call the 24-hour hour crisis hotline for domestic abuse at [phone]***











Will Tuttle MD, R3 Mar 2, 2018 13:22

## 2018-03-02 NOTE — PD.CARD.PN
Subjective


Subjective Remarks


awake in nad





Objective


Medications





Current Medications








 Medications


  (Trade)  Dose


 Ordered  Sig/César


 Route  Start Time


 Stop Time Status Last Admin


 


  (NS Flush)  2 ml  UNSCH  PRN


 IV FLUSH  2/22/18 13:15


    2/28/18 08:40


 


 


  (NS Flush)  2 ml  BID


 IV FLUSH  2/22/18 21:00


    3/1/18 20:33


 


 


  (Tylenol)  650 mg  Q4H  PRN


 PO  2/22/18 13:15


     


 


 


  (Narcan Inj)  0.4 mg  UNSCH  PRN


 IV PUSH  2/22/18 13:15


     


 


 


  (Halina-Colace)  1 tab  BID


 PO  2/22/18 21:00


    3/2/18 10:35


 


 


  (Milk Of


 Magnesia Liq)  30 ml  Q12H  PRN


 PO  2/22/18 13:15


     


 


 


  (Senokot)  17.2 mg  Q12H  PRN


 PO  2/22/18 13:15


     


 


 


  (Dulcolax Supp)  10 mg  DAILY  PRN


 RECTAL  2/22/18 13:15


     


 


 


  (Lactulose Liq)  30 ml  DAILY  PRN


 PO  2/22/18 13:15


     


 


 


  (Lovenox Inj)  30 mg  Q24H


 SQ  2/24/18 09:30


    3/2/18 10:34


 


 


  (Oscal-D 250-125)  250 mg  TID


 PO  2/23/18 13:00


    3/2/18 10:36


 


 


  (Benadryl)  25 mg  Q6H  PRN


 PO  2/23/18 10:15


     


 


 


  (Drisdol)  50,000 units  Q7D


 PO  2/23/18 11:00


     


 


 


  (Vitamin D3)  1,000 units  DAILY


 PO  2/24/18 09:00


    3/2/18 10:33


 


 


  (Vitamin C)  1,000 mg  DAILY


 PO  2/24/18 09:00


    3/2/18 10:34


 


 


  (Morphine Inj)  4 mg  Q3H  PRN


 IV PUSH  2/24/18 13:45


    2/26/18 11:52


 


 


  (Haldol Inj)  0.5 mg  Q6HR  PRN


 IV PUSH  2/24/18 12:15


    2/24/18 20:12


 


 


 Dextrose/Sodium


 Chloride  1,000 ml @ 


 80 mls/hr  [id number]


 IV  2/25/18 13:00


   Future hold 3/1/18 20:33


 


 


  (Ecotrin Ec)  81 mg  DAILY


 PO  2/28/18 09:00


    3/2/18 10:34


 


 


 Cefazolin Sodium


 1000 mg/Sodium


 Chloride  100 ml @ 


 200 mls/hr  ON  CALL


 IV  2/27/18 16:45


 3/2/18 16:44   


 


 


 Lactated Ringer's  1,000 ml @ 


 30 mls/hr  Q24H PRN


 IV  2/28/18 06:00


 3/3/18 05:59  2/28/18 08:40


 


 


 Sodium Chloride  500 ml @ 


 30 mls/hr  [id number] PRN


 IV  2/28/18 06:00


 3/3/18 05:59   


 


 


  (Betadine 5%


 Antisepsis Kit)  1 applic  ON CALL  PRN


 EACH NARE  2/28/18 06:00


 3/3/18 05:59   


 


 


  (Chlorhexidine


 2% Cloth)  3 pack  ON CALL  PRN


 TOPICAL  2/28/18 06:00


 3/3/18 05:59   


 


 


  (Cardizem)  30 mg  Q6H


 PO  3/1/18 09:00


    3/2/18 10:34


 


 


  (Norco  5-325 Mg)  1 tab  Q6H


 PEG  3/1/18 16:00


    3/2/18 10:35


 


 


 Diltiazem HCl 125


 mg/Sodium Chloride  125 ml @ 5


 mls/hr  TITRATE  PRN


 IV  3/1/18 17:00


     


 








Vital Signs / I&O





Vital Signs








  Date Time  Temp Pulse Resp B/P (MAP) Pulse Ox O2 Delivery O2 Flow Rate FiO2


 


3/2/18 12:00 98.4 93 16 110/75 (87) 94   


 


3/2/18 10:33     90   21


 


3/2/18 08:00      Nasal Cannula  


 


3/2/18 08:00 98.1 68 16 112/72 (85) 90   


 


3/2/18 07:53  105      


 


3/2/18 04:00      Nasal Cannula 3.00 


 


3/2/18 04:00 97.0 105 20 115/72 (86) 98   


 


3/2/18 04:00  81      


 


3/2/18 00:00 97.3 94 20 87/51 (63) 98   


 


3/2/18 00:00      Nasal Cannula 3.00 


 


3/2/18 00:00  92      


 


3/1/18 20:45      Nasal Cannula 3.00 


 


3/1/18 20:00 98.7 90 17 116/75 (89) 98   


 


3/1/18 20:00  86      


 


3/1/18 20:00      Nasal Cannula 3.00 


 


3/1/18 16:00 98.6 129 18 120/70 (87) 95   














I/O      


 


 3/1/18 3/1/18 3/1/18 3/2/18 3/2/18 3/2/18





 07:00 15:00 23:00 07:00 15:00 23:00


 


Intake Total 125 ml   1290 ml  


 


Output Total    200 ml  


 


Balance 125 ml   1090 ml  


 


      


 


IV Total 125 ml   760 ml  


 


Tube Feeding    530 ml  


 


Output Urine Total    200 ml  


 


# Voids 1 2    


 


# Bowel Movements 1 1  0  








Physical Exam


GENERAL: 


SKIN: Warm and dry.


HEAD: Normocephalic.


EYES: No scleral icterus. No injection or drainage. 


NECK: Supple, trachea midline. No JVD or lymphadenopathy.


CARDIOVASCULAR: Regular rate and rhythm without murmurs, gallops, or rubs. 


RESPIRATORY: Breath sounds equal bilaterally. No accessory muscle use.


GASTROINTESTINAL: Abdomen soft, non-tender, nondistended. 


MUSCULOSKELETAL: No cyanosis, or edema. 


BACK: Nontender without obvious deformity. No CVA tenderness.





Laboratory





Laboratory Tests








Test


  3/2/18


05:54


 


White Blood Count 11.9 TH/MM3 


 


Red Blood Count 3.63 MIL/MM3 


 


Hemoglobin 9.8 GM/DL 


 


Hematocrit 29.8 % 


 


Mean Corpuscular Volume 81.9 FL 


 


Mean Corpuscular Hemoglobin 26.9 PG 


 


Mean Corpuscular Hemoglobin


Concent 32.8 % 


 


 


Red Cell Distribution Width 16.8 % 


 


Platelet Count 495 TH/MM3 


 


Mean Platelet Volume 8.4 FL 


 


Blood Urea Nitrogen 11 MG/DL 


 


Creatinine 0.49 MG/DL 


 


Random Glucose 134 MG/DL 


 


Calcium Level 8.4 MG/DL 


 


Sodium Level 138 MEQ/L 


 


Potassium Level 3.7 MEQ/L 


 


Chloride Level 103 MEQ/L 


 


Carbon Dioxide Level 25.5 MEQ/L 


 


Anion Gap 10 MEQ/L 


 


Estimat Glomerular Filtration


Rate 121 ML/MIN 


 











Assessment and Plan


Problem List:  


(1) New onset a-fib


ICD Codes:  I48.91 - Unspecified atrial fibrillation


Status:  Acute


(2) Atrial fibrillation with RVR


ICD Codes:  I48.91 - Unspecified atrial fibrillation


Status:  Acute


(3) Dementia


ICD Codes:  F03.90 - Unspecified dementia without behavioral disturbance


Status:  Chronic


(4) Fracture, tibia and fibula, shaft


ICD Codes:  S82.209A - Unspecified fracture of shaft of unspecified tibia, 

initial encounter for closed fracture; S82.409A - Unspecified fracture of shaft 

of unspecified fibula, initial encounter for closed fracture


Status:  Acute


Assessment and Plan


1.) Afib- rate controlled, pod # 7, ortho surgery, explained to daughter and 

son coumadin or noac indicated due to [id number] socre = 3, however bleeding 

and complication risk would be high due to fall risk, dementia and difficulty 

with compliance; they refuse coumadin or noac, request aspirin 81 mg qd 

understanding cva risk will be higher





Problem Qualifiers





(1) Dementia:  


Qualified Codes:  F03.90 - Unspecified dementia without behavioral disturbance


(2) Fracture, tibia and fibula, shaft:  


Qualified Codes:  S82.202A - Unspecified fracture of shaft of left tibia, 

initial encounter for closed fracture; S82.402A - Unspecified fracture of shaft 

of left fibula, initial encounter for closed fracture








Jayden Lama MD Mar 2, 2018 13:51

## 2018-03-06 NOTE — HHI.DS
Discharge Summary


Admission Date


Feb 22, 2018 at 15:29


Discharge Date:  Mar 2, 2018


Admitting Diagnosis








(1) Atrial fibrillation with RVR


Diagnosis:  Principal


Plan:  -Cardiology consult--appreciate recs


-Stable on Cardizem PO 30mg PO q6h 


-Per Cardiology, pt started on Aspirin 81mg qd for antiplatelet per family 

request.


ICD Codes:  I48.91 - Unspecified atrial fibrillation


Status:  Acute


(2) Failure to thrive in adult


Diagnosis:  Principal


Plan:  Pt with inadequate PO and concern by family about continued ability to 

be able to take adequate caloric intake. Pt is frail with recent osteoporotic 

fracture of left tibia and fibula from a fall, and pt cannot walk anymore. 

Discussion with daughter on 2/27 revealed their concern that she is not able to 

take adequate nutrition on her own. 





POD #2 of PEG tube placement. Nutrition was consulted and recommended Jevity 

1.5 @ 50mls/hr x 24hr. patient tolerating tube feeds


ICD Codes:  R62.7 - Adult failure to thrive


(3) Delirium


Diagnosis:  Secondary


Plan:  Nonpharmacologic treatment: Maintain orientation if possible, minimize 

sleep deprivation, maintain mobility (out of bed to chair with PT) if and when 

able


Consider pharmacologic treatment if needed: Haloperidol when necessary





1. Keep pain controlled with Norco; pt gets more agitated when pain 

inadequately controlled


2. Can use Haldol 0.5mg IV PRN as last resort


Consider restraints if remains uncooperative after pain is controlled and haldol


ICD Codes:  R41.0 - Disorientation, unspecified


Status:  Acute


(4) Gastritis


Diagnosis:  Secondary


Plan:  EGD on 2/28 revealed mild gastritis


Biopsy taken


If biopsy comes back positive, give patient H2 blocker per GI recs


ICD Codes:  K29.70 - Gastritis, unspecified, without bleeding


Status:  Acute


(5) Fracture, tibia and fibula, shaft


Diagnosis:  Secondary


Plan:  -Orthopedics consult (Dr Velásquez) 


ortho has signed off


-Dressing changes per ortho


-Morphine 2mg IV q4 hours scheduled (hold for sedation)


-Morphine 4mg IV q3h PRN breakthru


ICD Codes:  S82.209A - Unspecified fracture of shaft of unspecified tibia, 

initial encounter for closed fracture; S82.409A - Unspecified fracture of shaft 

of unspecified fibula, initial encounter for closed fracture


Status:  Acute


(6) Pulmonary edema


Diagnosis:  Secondary


Plan:  Chest xray on 2/24 concerning for pulmonary edema.


Careful with IV fluids, however patient is not eating yet


Sit upright in bed


Consider lasix and repeat chest xray if SOB


ICD Codes:  J81.1 - Chronic pulmonary edema


Status:  Resolved


(7) Dementia


Diagnosis:  Secondary


Plan:  Stable. Takes no medications at [facility]


ICD Codes:  F03.90 - Unspecified dementia without behavioral disturbance


Status:  Chronic


(8) FEN/GI/PPx


Diagnosis:  Secondary


Plan:  Fluids: Tolerating tube feeds.


Electrolytes: will monitor and replete as necessary


Nutrition: start Jevity 1.5 @ 50ml/hr via PEG tube


GI: not indicated


PPx: SCD on RLE; lovenox 30 per ortho





CM to assist with SNF


PT to eval and treat,


Status:  Acute


Consultants


Orthopedic surgery


Cardiology


Gastroenterology


Procedures


IMN left tibia.


PEG tube


Brief History


Ms Alvarenga is an 81YO female w/PMHx of dementia who presented from [facility] after a fall  with left distal tibia and fibula fractures on imaging and 

new onset afib RVR. Prashanth Sidhu, her son-in-law and HCPOA for Ms Alvarenga, 

reports that she fell off of a chair this morning at the [facility]. She is attended 

in the room by her two grandchildren, Cliff and Enriqueta Sidhu who cannot provide 

any other information other than that Ms Alvarenga has PMHx of dementia dx around 

2011, and that she is pretty healthy and does not take any medications. The pt 

is not oriented to person, place or time but states she is not in pain; however

, when the HOB is elevated starts yelling, "stop doing that, it hurts". Heart 

rate on the monitor in the ED was elevated to 170s-180s. Dr Velásquez was called 

for orthopedic consult.


She was seen this afternoon after her surgery and she is still sleeping/sedated 

but resting quietly. Her heart rate is in the 70s and she is very comfortable 

now.  History obtained from her family as above.


CBC/BMP:  


3/2/18 0554                                                                    

            3/2/18 0554





Imaging





Last Impressions








Chest X-Ray 2/24/18 0000 Signed





Impressions: 





 Service Date/Time:  Saturday, February 24, 2018 11:29 - CONCLUSION:  





 Interstitial pulmonary edema. This appears to be increased compared to the 

prior 





 exam.     Petr Salgado MD 


 


Tibia/Fibula X-Ray 2/23/18 0000 Signed





Impressions: 





 Service Date/Time:  Friday, February 23, 2018 09:57 - CONCLUSION:  1. Status 





 post left tibial IM braydon fixation, as above.     Bennie Barger MD 


 


Pelvis X-Ray 2/22/18 0000 Signed





Impressions: 





 Service Date/Time:  Thursday, February 22, 2018 10:21 - CONCLUSION:  The bony 





 pelvic ring is grossly intact.  Symmetric appearance of bilateral total hip 





 arthroplasty.     Dylan Candelaria MD 


 


Femur X-Ray 2/22/18 0000 Signed





Impressions: 





 Service Date/Time:  Thursday, February 22, 2018 10:23 - CONCLUSION:  1. No 





 fracture seen. 2. Possible knee effusion.     Dylan Candelaria MD 








PE at Discharge


GENERAL: This is a thin, elderly female asleep in bed.  No acute distress


SKIN: cool and dry 


HEAD: Atraumatic. Normocephalic. 


EYES:  No scleral icterus. No injection or drainage. 


ENT: Nose without drainage.


NECK: Trachea midline. No JVD or lymphadenopathy.  no meningeal signs.


CARDIOVASCULAR: irregular rate, not tachy, without murmur, gallop, or rub. 


RESPIRATORY: Clear to auscultation. Breath sounds equal bilaterally. No wheezes

, rales, or rhonchi.  


GASTROINTESTINAL: Abdomen soft, non-tender, nondistended. No hepato-splenomegaly

, or palpable masses. No guarding. PEG tube placed in LUQ. Dressing clean and 

intact.


MUSCULOSKELETAL: Extremities without clubbing, cyanosis, or edema. No edema of 

RLE. LLE in boot and ace bandage to the knee. No right calf tenderness. SCD on 

RLE.


NEUROLOGICAL: Motor and sensory grossly within normal limits.


Hospital Course


Patient had a successful orthopedic surgery on her leg.  Cardiology was 

consulted for atrial fibrillation.  Her rate was initially controlled with a 

diltiazem drip.  Early on during her hospitalization, the patient became 

delirious.  She also was not tolerating meals initially.  She was able to take 

Cardizem 30 mg every 6 hours in applesauce and ice cream.  She was placed on IV 

fluids initially.  However, after a few days, the decision was made to place a 

PEG tube because she was not tolerating by mouth.  Palliative care was 

consulted to help determine goals of care.  Goals of care remains aggressive, 

full code.  A full evaluation by palliative care can be found in the EMR.  GI 

was consulted for PEG tube placement.  After PEG tube placement, the patient 

remained stable.  She was not pulling at lines were being aggressive to staff.  

She was discharged to a skilled nursing facility.  Medications were well 

tolerated in the PEG tube.  Nutrition was consulted to help with dietary 

management.


Orthopedics and the recommendations for follow-up and are in the EMR


Cardiology had a discussion with the family regarding anticoagulation.  The 

bleeding and complication risk was high due to falls, dementia and difficulty 

with compliance.  After discussion with the family, and per the family's request

, it was recommended she take aspirin 81 mg daily.


Pt Condition on Discharge:  Stable


Discharge Disposition:  Discharge to SNF


Discharge Instructions


DIET: Follow Instructions for:  On Tube Feeding


Activities you can perform:  See Additionl Instruction


Other Activity Instructions:  


per PT


Follow up Referrals:  


Cardiology - 1 Week with Jayden Lama MD


Orthopedics - 2 Weeks @ [facility] with Michael Velásquez MD


PCP Follow-up - 1 Week





New Medications:  


Aspirin (Aspirin 81 Low Dose) 81 Mg Chew


81 MG CHEW DAILY, #30 TAB





Calcium Carbonate-Vitamin D (Calcium 600+D 200) 600-200 Mg-Unit Tab


1 TAB PO BID for Nutritional Supplement, #90 TAB 0 Refills





Enoxaparin Inj (Lovenox Inj) 30 Mg/0.3 Ml Syr


30 MG SQ DAILY for Blood Clot Prevention, #4 SYRINGE 0 Refills





Ergocalciferol (Ergocalciferol) 50,000 Unit Cap


62799 UNITS PO Q7D for Nutritional Supplement, #8 CAP





Walker/Adult/Folding (Walker/Adult/Folding) 1 Mis Mis


EA .XX AS DIRECTED, #1 0 Refills





Hydrocodone/Acetaminophen (Hydrocodone-Acetamin 5-325 mg) 5 Mg-325 Mg Tablet


1 TAB PEG Q6H, #30 0 Refills

















Will Tuttle MD, R3 Mar 6, 2018 15:11

## 2018-03-24 ENCOUNTER — HOSPITAL ENCOUNTER (INPATIENT)
Dept: HOSPITAL 17 - NEPE | Age: 83
LOS: 6 days | Discharge: HOME HEALTH | DRG: 394 | End: 2018-03-30
Attending: FAMILY MEDICINE | Admitting: FAMILY MEDICINE
Payer: MEDICARE

## 2018-03-24 VITALS
DIASTOLIC BLOOD PRESSURE: 72 MMHG | OXYGEN SATURATION: 96 % | RESPIRATION RATE: 16 BRPM | HEART RATE: 75 BPM | SYSTOLIC BLOOD PRESSURE: 132 MMHG

## 2018-03-24 VITALS
OXYGEN SATURATION: 95 % | SYSTOLIC BLOOD PRESSURE: 139 MMHG | RESPIRATION RATE: 16 BRPM | HEART RATE: 96 BPM | DIASTOLIC BLOOD PRESSURE: 78 MMHG

## 2018-03-24 VITALS
HEART RATE: 127 BPM | DIASTOLIC BLOOD PRESSURE: 66 MMHG | SYSTOLIC BLOOD PRESSURE: 151 MMHG | OXYGEN SATURATION: 97 % | RESPIRATION RATE: 18 BRPM | TEMPERATURE: 98.1 F

## 2018-03-24 VITALS
DIASTOLIC BLOOD PRESSURE: 95 MMHG | HEART RATE: 104 BPM | RESPIRATION RATE: 18 BRPM | OXYGEN SATURATION: 98 % | SYSTOLIC BLOOD PRESSURE: 127 MMHG

## 2018-03-24 VITALS — BODY MASS INDEX: 22.5 KG/M2 | WEIGHT: 139.99 LBS | HEIGHT: 66 IN

## 2018-03-24 DIAGNOSIS — K44.9: ICD-10-CM

## 2018-03-24 DIAGNOSIS — Z79.82: ICD-10-CM

## 2018-03-24 DIAGNOSIS — I48.91: ICD-10-CM

## 2018-03-24 DIAGNOSIS — L97.429: ICD-10-CM

## 2018-03-24 DIAGNOSIS — K94.23: Primary | ICD-10-CM

## 2018-03-24 DIAGNOSIS — R45.1: ICD-10-CM

## 2018-03-24 DIAGNOSIS — L98.411: ICD-10-CM

## 2018-03-24 DIAGNOSIS — Z78.1: ICD-10-CM

## 2018-03-24 DIAGNOSIS — E46: ICD-10-CM

## 2018-03-24 DIAGNOSIS — B96.81: ICD-10-CM

## 2018-03-24 DIAGNOSIS — R62.7: ICD-10-CM

## 2018-03-24 DIAGNOSIS — G47.00: ICD-10-CM

## 2018-03-24 DIAGNOSIS — K29.50: ICD-10-CM

## 2018-03-24 DIAGNOSIS — F03.90: ICD-10-CM

## 2018-03-24 DIAGNOSIS — K83.8: ICD-10-CM

## 2018-03-24 DIAGNOSIS — I48.92: ICD-10-CM

## 2018-03-24 DIAGNOSIS — K31.819: ICD-10-CM

## 2018-03-24 DIAGNOSIS — K80.50: ICD-10-CM

## 2018-03-24 LAB
ALBUMIN SERPL-MCNC: 2.8 GM/DL (ref 3.4–5)
ALP SERPL-CCNC: 190 U/L (ref 45–117)
ALT SERPL-CCNC: 47 U/L (ref 10–53)
AST SERPL-CCNC: 48 U/L (ref 15–37)
BASOPHILS # BLD AUTO: 0.2 TH/MM3 (ref 0–0.2)
BASOPHILS NFR BLD: 1.3 % (ref 0–2)
BILIRUB SERPL-MCNC: 0.4 MG/DL (ref 0.2–1)
BUN SERPL-MCNC: 20 MG/DL (ref 7–18)
CALCIUM SERPL-MCNC: 9 MG/DL (ref 8.5–10.1)
CHLORIDE SERPL-SCNC: 104 MEQ/L (ref 98–107)
CREAT SERPL-MCNC: 0.56 MG/DL (ref 0.5–1)
EOSINOPHIL # BLD: 0.2 TH/MM3 (ref 0–0.4)
EOSINOPHIL NFR BLD: 1.7 % (ref 0–4)
ERYTHROCYTE [DISTWIDTH] IN BLOOD BY AUTOMATED COUNT: 17.5 % (ref 11.6–17.2)
GFR SERPLBLD BASED ON 1.73 SQ M-ARVRAT: 104 ML/MIN (ref 89–?)
GLUCOSE SERPL-MCNC: 100 MG/DL (ref 74–106)
HCO3 BLD-SCNC: 26.2 MEQ/L (ref 21–32)
HCT VFR BLD CALC: 34 % (ref 35–46)
HGB BLD-MCNC: 11.2 GM/DL (ref 11.6–15.3)
INR PPP: 1.1 RATIO
LYMPHOCYTES # BLD AUTO: 1.9 TH/MM3 (ref 1–4.8)
LYMPHOCYTES NFR BLD AUTO: 14.6 % (ref 9–44)
MAGNESIUM SERPL-MCNC: 2 MG/DL (ref 1.5–2.5)
MCH RBC QN AUTO: 26.7 PG (ref 27–34)
MCHC RBC AUTO-ENTMCNC: 33 % (ref 32–36)
MCV RBC AUTO: 81.1 FL (ref 80–100)
MONOCYTE #: 0.9 TH/MM3 (ref 0–0.9)
MONOCYTES NFR BLD: 6.6 % (ref 0–8)
NEUTROPHILS # BLD AUTO: 9.8 TH/MM3 (ref 1.8–7.7)
NEUTROPHILS NFR BLD AUTO: 75.8 % (ref 16–70)
PLATELET # BLD: 443 TH/MM3 (ref 150–450)
PMV BLD AUTO: 10 FL (ref 7–11)
PROT SERPL-MCNC: 8.1 GM/DL (ref 6.4–8.2)
PROTHROMBIN TIME: 10.7 SEC (ref 9.8–11.6)
RBC # BLD AUTO: 4.2 MIL/MM3 (ref 4–5.3)
SODIUM SERPL-SCNC: 139 MEQ/L (ref 136–145)
TROPONIN I SERPL-MCNC: 0.05 NG/ML (ref 0.02–0.05)
TROPONIN I SERPL-MCNC: 0.05 NG/ML (ref 0.02–0.05)
WBC # BLD AUTO: 12.9 TH/MM3 (ref 4–11)

## 2018-03-24 PROCEDURE — 80053 COMPREHEN METABOLIC PANEL: CPT

## 2018-03-24 PROCEDURE — 82948 REAGENT STRIP/BLOOD GLUCOSE: CPT

## 2018-03-24 PROCEDURE — 88312 SPECIAL STAINS GROUP 1: CPT

## 2018-03-24 PROCEDURE — 85025 COMPLETE CBC W/AUTO DIFF WBC: CPT

## 2018-03-24 PROCEDURE — 80048 BASIC METABOLIC PNL TOTAL CA: CPT

## 2018-03-24 PROCEDURE — 84443 ASSAY THYROID STIM HORMONE: CPT

## 2018-03-24 PROCEDURE — 82550 ASSAY OF CK (CPK): CPT

## 2018-03-24 PROCEDURE — 87086 URINE CULTURE/COLONY COUNT: CPT

## 2018-03-24 PROCEDURE — 96375 TX/PRO/DX INJ NEW DRUG ADDON: CPT

## 2018-03-24 PROCEDURE — 85610 PROTHROMBIN TIME: CPT

## 2018-03-24 PROCEDURE — 93005 ELECTROCARDIOGRAM TRACING: CPT

## 2018-03-24 PROCEDURE — 83690 ASSAY OF LIPASE: CPT

## 2018-03-24 PROCEDURE — 83735 ASSAY OF MAGNESIUM: CPT

## 2018-03-24 PROCEDURE — 74160 CT ABDOMEN W/CONTRAST: CPT

## 2018-03-24 PROCEDURE — 88305 TISSUE EXAM BY PATHOLOGIST: CPT

## 2018-03-24 PROCEDURE — 74018 RADEX ABDOMEN 1 VIEW: CPT

## 2018-03-24 PROCEDURE — 84439 ASSAY OF FREE THYROXINE: CPT

## 2018-03-24 PROCEDURE — 96365 THER/PROPH/DIAG IV INF INIT: CPT

## 2018-03-24 PROCEDURE — 85730 THROMBOPLASTIN TIME PARTIAL: CPT

## 2018-03-24 PROCEDURE — 71045 X-RAY EXAM CHEST 1 VIEW: CPT

## 2018-03-24 PROCEDURE — 84484 ASSAY OF TROPONIN QUANT: CPT

## 2018-03-24 PROCEDURE — 85027 COMPLETE CBC AUTOMATED: CPT

## 2018-03-24 PROCEDURE — L1906 AFO MULTILIG ANK SUP PRE OTS: HCPCS

## 2018-03-24 PROCEDURE — 81001 URINALYSIS AUTO W/SCOPE: CPT

## 2018-03-24 PROCEDURE — 96366 THER/PROPH/DIAG IV INF ADDON: CPT

## 2018-03-24 PROCEDURE — 74330 X-RAY BILE/PANC ENDOSCOPY: CPT

## 2018-03-24 RX ADMIN — PHENYTOIN SODIUM SCH MLS/HR: 50 INJECTION INTRAMUSCULAR; INTRAVENOUS at 18:04

## 2018-03-24 RX ADMIN — Medication SCH ML: at 20:00

## 2018-03-24 RX ADMIN — STANDARDIZED SENNA CONCENTRATE AND DOCUSATE SODIUM SCH TAB: 8.6; 5 TABLET, FILM COATED ORAL at 21:00

## 2018-03-24 RX ADMIN — SODIUM CHLORIDE PRN MLS/HR: 900 INJECTION, SOLUTION INTRAVENOUS at 16:06

## 2018-03-24 NOTE — EKG
Date Performed: 2018       Time Performed: 15:21:13

 

PTAGE:      82 years

 

EKG:      ATRIAL FIBRILLATION WITH RAPID VENTRICULAR RESPONSE INCOMPLETE RIGHT BUNDLE BRANCH BLOCK LE
FT ANTERIOR FASCICULAR BLOCK SEPTAL MYOCARDIAL INFARCTION ABNORMAL ECG Since the 

 

 PREVIOUS TRACING            , no significant change noted PREVIOUS TRACIN2018 10.00

 

DOCTOR:   Delvin Pena  Interpretating Date/Time  2018 19:58:19

## 2018-03-24 NOTE — RADRPT
EXAM DATE/TIME:  03/24/2018 16:58 

 

[facility] COMPARISON:     

CHEST SINGLE AP, February 24, 2018, 11:29.

 

                     

INDICATIONS :     

Cough.

                     

 

MEDICAL HISTORY :     

None.          

 

SURGICAL HISTORY :     

None.   

 

ENCOUNTER:     

Initial                                        

 

ACUITY:     

1 day      

 

PAIN SCORE:     

Non-responsive.

 

LOCATION:     

Bilateral chest 

 

FINDINGS:     

A single view of the chest demonstrates the lungs to be symmetrically aerated without evidence of mas
s, infiltrate or effusion.  The cardiomediastinal contours are unremarkable.  Osseous structures are 
intact.

 

CONCLUSION:     No acute disease.  

 

 

 

 Don Retana MD FACR on March 24, 2018 at 17:34           

Board Certified Radiologist.

 This report was verified electronically.

## 2018-03-24 NOTE — HHI.HP
[facility]


Service


Family Medicine


Primary Care Physician


Shawna Berger MD


Admission Diagnosis





afib with rvr, gtube malfunction.  hx of dementia


Diagnoses:  


International Travel<30 Days:  No


Contact w/Intl Traveler<30days:  No


History of Present Illness


Patient is an 82 year old female with history of severe dementia and G-tube 

malfunction who presents from [facility] for evaluation of G-tube 

malfunction.  History obtained from son-in-law over the phone.  





The son-in-law and daughter went to nursing home this morning to learn how to 

manage G-tube and the patient reportedly complained of pain and they noted 

redness in the area around the tube which concerned him.  They called the 

physician who recommended the patient be evaluated. They do note that the 

patient picks at the tube and she had a strap around her abdomen





On evaluation in ED she was noted to be in atrial fibrillation with RVR, with 

no noted history in nursing home documentation for this.  She was in rates of 

150s and required Cardizem with transition to Cardizem drip for rate control





She answers "[place]" and "yes" to all questions.  She notably appears to be 

confused. She is now on cardizem gtt with rate 90s-100s. Patient does not 

appear to be in pain.





Review of Systems


ROS Limitations:  Other (Chronic severe dementia)





Past Family Social History


Past Medical History


Atrial flutter


Generalized muscle weakness


Pathologic fracture of tibia/fibula, unclear if associated with malignancy


Dysphasia, failure to thrive


Dementia, agitation


Gastritis without bleeding


History of alcohol dependence


Generalized anxiety disorder


Insomnia


Past Surgical History


Tib-fib fracture repair 2018


Reported Medications





Reported Meds & Active Scripts


Active


Aspirin 81 Low Dose (Aspirin) 81 Mg Chew 81 Mg CHEW DAILY


Lovenox Inj (Enoxaparin Sodium) 30 Mg/0.3 Ml Syr 30 Mg SQ DAILY


Hydrocodone-Acetamin 5-325 mg (Hydrocodone/Acetaminophen) 5 Mg-325 Mg Tablet 1 

Tab PEG Q6H


Calcium 600+D 200 (Calcium Carbonate-Vitamin D) 600-200 Mg-Unit Tab 1 Tab PO BID


Ergocalciferol 50,000 Unit Cap 50,000 Units PO Q7D


Allergies:  


Coded Allergies:  


     No Known Allergies (Unverified , 18)


Active Ordered Medications





Inpatient Medications


Bisacodyl (Dulcolax Supp) 10 mg DAILY  PRN RECTAL SEVERE CONSITIPATION;  Start 3

/24/18 at 18:15


Diltiazem HCl (Cardizem Inj) 17 mg ONCE  ONCE IV  Last administered on 3/24/

18at 15:14;  Start 3/24/18 at 15:15;  Stop 3/24/18 at 15:16;  Status DC


Diltiazem HCl 125 mg/Sodium Chloride 125 ml @ 5 mls/hr TITRATE  PRN IV 

tachycardia Last administered on 3/24/18at 16:06;  Start 3/24/18 at 14:45


Lactulose (Lactulose Liq) 30 ml DAILY  PRN PO SEVERE CONSITIPATION;  Start 3/24/

18 at 18:15


Magnesium Hydroxide (Milk Of Magnesia Liq) 30 ml Q12H  PRN PO Mild constipation

;  Start 3/24/18 at 18:15


Naloxone HCl (Narcan Inj) 0.4 mg UNSCH  PRN IV PUSH SEE LABEL COMMENTS;  Start 3

/24/18 at 18:15


Ondansetron HCl (Zofran Inj) 4 mg Q6H  PRN IVP NAUSEA OR VOMITING;  Start 3/24/

18 at 18:15


Senna/Docusate Sodium (Halina-Colace) 1 tab BID PO ;  Start 3/24/18 at 21:00


Sennosides (Senokot) 17.2 mg Q12H  PRN PO Moderate constipation;  Start 3/24/18 

at 18:15


Sodium Chloride (NS Flush) 2 ml BID IV FLUSH ;  Start 3/24/18 at 21:00;  Stop 3/

24/18 at 21:18;  Status DC


Family History


Unknown


Social History


Lives at [facility] since 


Healthcare surrogate is her son-in-law


Full code





Physical Exam


Vital Signs





Vital Signs








  Date Time  Temp Pulse Resp B/P (MAP) Pulse Ox O2 Delivery O2 Flow Rate FiO2


 


3/24/18 17:32  104 18 127/95 (106) 98 Room Air  


 


3/24/18 16:06  104  136/68    


 


3/24/18 14:40  116 18  97 Room Air  


 


3/24/18 14:37 98.1 127 18 151/66 (94) 97   








Physical Exam


GENERAL: Malnourished-appearing patient who repetitive mumbles, smiles, and 

answers "yes". She does not appear to be in distress. She is wearing UE soft 

restraints. She has  on her bilateral feet. 


SKIN: Unstageable left foot wound spanning entire heel, multiple left buttock 

1st degree ulcers, small. No ecchymoses. Cool and dry.


HEAD: Atraumatic. Normocephalic. No temporal or scalp tenderness.


EYES: Cataracts bilaterally. PERRL. EOMI. No scleral icterus. No injection or 

drainage.


ENT: Tooth decay noted. Exceptionally dry mouth. Poor oral care. Throat without 

erythema, tonsillar hypertrophy or exudate. Uvula midline. Airway patent. Nose 

without bleeding or purulent drainage. Patient refused ear exam. 


NECK: Trachea midline. No JVD or lymphadenopathy. Supple, nontender, no 

meningeal signs.


CARDIOVASCULAR: Regular rate and rhythm without murmurs, gallops, or rubs. 2+ 

bilateral pulses in UEs and LEs. 


RESPIRATORY: Clear to auscultation. Breath sounds equal bilaterally. No wheezes

, rales, or rhonchi.  


GASTROINTESTINAL: G tube placed in RUQ, with food particles in tubing. Skin at 

insertion site mildly irritated, and patient says "ow" upon examination. 

Abdomen otherwise soft, non-tender, nondistended. No hepato-splenomegaly, or 

palpable masses. Voluntary guarding noted.


MUSCULOSKELETAL: Extremities without clubbing, cyanosis, or edema. No joint 

tenderness, effusion, or edema noted. No calf tenderness. 


NEUROLOGICAL: Awake and alert but markedly confused. Moves all extremities 

spontaneously.


Laboratory





Laboratory Tests








Test


  3/24/18


14:45


 


White Blood Count 12.9 


 


Red Blood Count 4.20 


 


Hemoglobin 11.2 


 


Hematocrit 34.0 


 


Mean Corpuscular Volume 81.1 


 


Mean Corpuscular Hemoglobin 26.7 


 


Mean Corpuscular Hemoglobin


Concent 33.0 


 


 


Red Cell Distribution Width 17.5 


 


Platelet Count 443 


 


Mean Platelet Volume 10.0 


 


Neutrophils (%) (Auto) 75.8 


 


Lymphocytes (%) (Auto) 14.6 


 


Monocytes (%) (Auto) 6.6 


 


Eosinophils (%) (Auto) 1.7 


 


Basophils (%) (Auto) 1.3 


 


Neutrophils # (Auto) 9.8 


 


Lymphocytes # (Auto) 1.9 


 


Monocytes # (Auto) 0.9 


 


Eosinophils # (Auto) 0.2 


 


Basophils # (Auto) 0.2 


 


CBC Comment DIFF FINAL 


 


Differential Comment  


 


Prothrombin Time 10.7 


 


Prothromb Time International


Ratio 1.1 


 


 


Activated Partial


Thromboplast Time 25.3 


 


 


Blood Urea Nitrogen 20 


 


Creatinine 0.56 


 


Random Glucose 100 


 


Total Protein 8.1 


 


Albumin 2.8 


 


Calcium Level 9.0 


 


Alkaline Phosphatase 190 


 


Aspartate Amino Transf


(AST/SGOT) 48 


 


 


Alanine Aminotransferase


(ALT/SGPT) 47 


 


 


Total Bilirubin 0.4 


 


Sodium Level 139 


 


Potassium Level 4.1 


 


Chloride Level 104 


 


Carbon Dioxide Level 26.2 


 


Anion Gap 9 


 


Estimat Glomerular Filtration


Rate 104 


 


 


Total Creatine Kinase 33 


 


Troponin I 0.05 








Result Diagram:  


3/24/18 1445                                                                   

             3/24/18 1445








Caprini VTE Risk Assessment


Caprini VTE Risk Assessment:  Mod/High Risk (score >= 2)


Caprini Risk Assessment Model











 Point Value = 1          Point Value = 2  Point Value = 3  Point Value = 5


 


Age 41-60


Minor surgery


BMI > 25 kg/m2


Swollen legs


Varicose veins


Pregnancy or postpartum


History of unexplained or recurrent


   spontaneous 


Oral contraceptives or hormone


   replacement


Sepsis (< 1 month)


Serious lung disease, including


   pneumonia (< 1 month)


Abnormal pulmonary function


Acute myocardial infarction


Congestive heart failure (< 1 month)


History of inflammatory bowel disease


Medical patient at bed rest Age 61-74


Arthroscopic surgery


Major open surgery (> 45 min)


Laparoscopic surgery (> 45 min)


Malignancy


Confined to bed (> 72 hours)


Immobilizing plaster cast


Central venous access Age >= 75


History of VTE


Family history of VTE


Factor V Leiden


Prothrombin 13856Z


Lupus anticoagulant


Anticardiolipin antibodies


Elevated serum homocysteine


Heparin-induced thrombocytopenia


Other congenital or acquired


   thrombophilia Stroke (< 1 month)


Elective arthroplasty


Hip, pelvis, or leg fracture


Acute spinal cord injury (< 1 month)








Prophylaxis Regimen











   Total Risk


Factor Score Risk Level Prophylaxis Regimen


 


0-1      Low Early ambulation


 


2 Moderate Order ONE of the following:


*Sequential Compression Device (SCD)


*Heparin 5000 units SQ BID


 


3-4 Higher Order ONE of the following medications:


*Heparin 5000 units SQ TID


*Enoxaparin/Lovenox 40 mg SQ daily (WT < 150 kg, CrCl > 30 mL/min)


*Enoxaparin/Lovenox 30 mg SQ daily (WT < 150 kg, CrCl > 10-29 mL/min)


*Enoxaparin/Lovenox 30 mg SQ BID (WT < 150 kg, CrCl > 30 mL/min)


AND/OR


*Sequential Compression Device (SCD)


 


5 or more Highest Order ONE of the following medications:


*Heparin 5000 units SQ TID (Preferred with Epidurals)


*Enoxaparin/Lovenox 40 mg SQ daily (WT < 150 kg, CrCl > 30 mL/min)


*Enoxaparin/Lovenox 30 mg SQ daily (WT < 150 kg, CrCl > 10-29 mL/min)


*Enoxaparin/Lovenox 30 mg SQ BID (WT < 150 kg, CrCl > 30 mL/min)


AND


*Sequential Compression Device (SCD)











Assessment and Plan


Assessment and Plan


Patient is an 82 year old male with history of atrial fibrillation, dementia, 

failure to thrive, and recent orthopedic procedure of LLE who presents from 

[facility] for workup of gastrostomy tube malfunction. On ED evaluation she 

was noted to have atrial fibrillation with RVR to rate 150s, now on cardizem 

gtt. Will admit to [facility] for further monitoring and management of atrial 

fibrillation. 





G tube was placed during last hospitalization (d/c'd 3/2/18) for failure to 

thrive. 





Spoke to IR on call nurse who spoke with Dr. Nolasco who stated that he will 

evaluate patient tomorrow.


Code Status


FULL CODE. Health care surrogate per nursing home documentation is Prashanth Sidhu

, phone number [phone], relationship is son-in-law. I spoke with him today 

and he confirmed HPI and stated that aggressive measures are desired.


Discussed Condition With


Dr. Zhen Ferrara


Problem List:  


(1) Atrial fibrillation with RVR


ICD Codes:  I48.91 - Unspecified atrial fibrillation


Status:  Acute


Plan:  Patient diagnosed with atrial fibrillation with RVR during last 

hospitalization (Feb to Mar 2018). This was related to fall resulting in LLE 

fracture, with diagnosis failure to thrive. She was discharged on cardizem PO 

but it is unclear if she has been receiving this (given G-tube malfunction 

timing is not clear and med rec not available). 


* Once med rec completed and GT functional, will transition patient back on to 

Cardizem p.o. dosing


* Med rec pending


* Continue Cardizem drip at this time


* Monitor vital signs closely and telemetry


* Trend troponins and EKGs given unclear history





(2) Malfunction of gastrostomy tube


ICD Codes:  K94.23 - Gastrostomy malfunction


Status:  Acute


Plan:  IR consulted, to evaluate tomorrow


Normal saline at 100 cc/h at this time


Patient may require boluses given severe dehydration





(3) Failure to thrive in adult


ICD Codes:  R62.7 - Adult failure to thrive


Status:  Chronic


Plan:  Patient has G-tube, malfunctioning at this time, aggressive measures 

desired by family, will reevaluate G tube


We will assess weight trends from last hospitalization





(4) Encounter for wound care


ICD Codes:  Z51.89 - Encounter for other specified aftercare


Status:  Acute


Plan:  She was noted to have unstageable left heel ulcer, ulcers 1st degree on 

buttocks





(5) Dementia


ICD Codes:  F03.90 - Unspecified dementia without behavioral disturbance


Status:  Chronic


Plan:  Chronic severe dementia noted.  Healthcare surrogate is documented 

elsewhere.  Redirect, soft restraints ordered given patient pulled out IV and 

try to pull out G-tube in ED





(6) Agitation


ICD Codes:  R45.1 - Restlessness and agitation


Status:  Acute


Plan:  As above





(7) FEN/GI/PPx


Status:  Acute


Plan:  


Fluids: NS @ 100ml/hr


Electrolytes: monitor and replete as needed


Nutrition: NPO


DVT Prophylaxis: Early ambulation.  Hold pharmacologic anticoagulation given pre

-procedure.


GI Prophylaxis: NPO


PRN anti-HTN: Vasotec 1.25mg qhr IV PRN for SBP > 180/ and/or DBP > 100





Case Mgmt consulted given coming from SNF








Physician Certification


2 Midnight Certification Type:  Admission for Inpatient Services


Order for Inpatient Services


The services are ordered in accordance with Medicare regulations or non-

Medicare payer requirements, as applicable.  In the case of services not 

specified as inpatient-only, they are appropriately provided as inpatient 

services in accordance with the 2-midnight benchmark.


Estimated LOS (days):  3


 days is the estimated time the patient will need to remain in the hospital, 

assuming treatment plan goals are met and no additional complications.


Post-Hospital Plan:  Not yet determined











Saranya Tobias MD Mar 24, 2018 19:12

## 2018-03-24 NOTE — PD
HPI


Chief Complaint:  Medical Device Problem


Time Seen by Provider:  14:38


Travel History


International Travel<30 days:  No


Contact w/Intl Traveler<30days:  No


Traveled to known affect area:  No





History of Present Illness


HPI


82-year-old female with history of dementia, sent from the nursing home for 

malfunctioning G-tube with pain on advancing liquids through her G-tube.  

Patient is unable to give any history.  According to the paramedics there was 

concern that the G-tube looked like it had blood around it.  There is no active 

bleeding reported.  Apparently they called their physician who instructed to 

send her to the emergency department for evaluation.





PFSH


Past Medical History


Heart Rhythm Problems:  Yes (A FLUTTER)


Cancer:  Yes (BLADDER)


Dementia:  Yes


Diabetes:  No


Diminished Hearing:  No


Immunizations Current:  No


Tetanus Vaccination:  Unknown


Influenza Vaccination:  No


Pregnant?:  Unknown





Past Surgical History


Genitourinary Surgery:  Yes (BLADDER CA)


Joint Replacement:  Yes (BI LAT HIP)





Social History


Alcohol Use:  No


Tobacco Use:  No


Substance Use:  Yes (PAST ETOH ABUSE)





Allergies-Medications


(Allergen,Severity, Reaction):  


Coded Allergies:  


     No Known Allergies (Unverified , 2/22/18)


Reported Meds & Prescriptions





Reported Meds & Active Scripts


Active


Aspirin 81 Low Dose (Aspirin) 81 Mg Chew 81 Mg CHEW DAILY


Lovenox Inj (Enoxaparin Sodium) 30 Mg/0.3 Ml Syr 30 Mg SQ DAILY


Hydrocodone-Acetamin 5-325 mg (Hydrocodone/Acetaminophen) 5 Mg-325 Mg Tablet 1 

Tab PEG Q6H


Calcium 600+D 200 (Calcium Carbonate-Vitamin D) 600-200 Mg-Unit Tab 1 Tab PO BID


Ergocalciferol 50,000 Unit Cap 50,000 Units PO Q7D








Review of Systems


ROS Limitations:  Other: (Patient dementia and unable to give any history.)


Except as stated in HPI:  all other systems reviewed are Neg


Gastrointestinal:  Positive: Other (G-tube not functioning properly.)





Physical Exam


Narrative


GENERAL: Thin elderly ill-appearing female in no acute respiratory distress.


SKIN: Focused skin assessment warm/dry.


HEAD: Atraumatic. Normocephalic. 


EYES:No scleral icterus. No injection or drainage. 


ENT: No nasal bleeding or discharge.  Mucous membranes dry and cracked.


NECK: Trachea midline.  Supple.


CARDIOVASCULAR: Irregularly irregular with a rate in the 120s-140s.


RESPIRATORY: Decreased respiratory effort.  No rales or rhonchi appreciated.


GASTROINTESTINAL: Abdomen soft, nondistended.  On evaluation the patient's G-

tube site, there is some induration around the site.  There is no bleeding.  

There is no external drainage.


MUSCULOSKELETAL: No obvious deformities. No clubbing.  No cyanosis.  No edema. 


NEUROLOGICAL: Awake and repetitive sentences.  Would not follow commands.  

Appeared in no obvious distress.





Data


Data


Last Documented VS





Vital Signs








  Date Time  Temp Pulse Resp B/P (MAP) Pulse Ox O2 Delivery O2 Flow Rate FiO2


 


3/24/18 17:32  104 18 127/95 (106) 98 Room Air  


 


3/24/18 14:37 98.1       








Orders





 Orders


Electrocardiogram (3/24/18 14:38)


Complete Blood Count With Diff (3/24/18 14:38)


Comprehensive Metabolic Panel (3/24/18 14:38)


Ckmb (Isoenzyme) Profile (3/24/18 14:38)


Troponin I (3/24/18 14:38)


Prothrombin Time / Inr (Pt) (3/24/18 14:38)


Act Partial Throm Time (Ptt) (3/24/18 14:38)


Urinalysis - C+S If Indicated (3/24/18 14:38)


Iv Access Insert/Monitor (3/24/18 14:38)


Ecg Monitoring (3/24/18 14:38)


Oximetry (3/24/18 14:38)


Diltiazem Inj (Cardizem Inj) (3/24/18 14:45)


Diltiazem Inj (Cardizem Inj) (3/24/18 15:15)


Chest, Single Ap (3/24/18 16:48)


Sodium Chlorid 0.9% 500 Ml Inj (Ns 500 M (3/24/18 17:00)


Sodium Chlor 0.9% 1000 Ml Inj (Ns 1000 M (3/24/18 17:00)


Admit Order (Ed Use Only) (3/24/18 17:57)





Labs





Laboratory Tests








Test


  3/24/18


14:45


 


White Blood Count 12.9 TH/MM3 


 


Red Blood Count 4.20 MIL/MM3 


 


Hemoglobin 11.2 GM/DL 


 


Hematocrit 34.0 % 


 


Mean Corpuscular Volume 81.1 FL 


 


Mean Corpuscular Hemoglobin 26.7 PG 


 


Mean Corpuscular Hemoglobin


Concent 33.0 % 


 


 


Red Cell Distribution Width 17.5 % 


 


Platelet Count 443 TH/MM3 


 


Mean Platelet Volume 10.0 FL 


 


Neutrophils (%) (Auto) 75.8 % 


 


Lymphocytes (%) (Auto) 14.6 % 


 


Monocytes (%) (Auto) 6.6 % 


 


Eosinophils (%) (Auto) 1.7 % 


 


Basophils (%) (Auto) 1.3 % 


 


Neutrophils # (Auto) 9.8 TH/MM3 


 


Lymphocytes # (Auto) 1.9 TH/MM3 


 


Monocytes # (Auto) 0.9 TH/MM3 


 


Eosinophils # (Auto) 0.2 TH/MM3 


 


Basophils # (Auto) 0.2 TH/MM3 


 


CBC Comment DIFF FINAL 


 


Differential Comment  


 


Prothrombin Time 10.7 SEC 


 


Prothromb Time International


Ratio 1.1 RATIO 


 


 


Activated Partial


Thromboplast Time 25.3 SEC 


 


 


Blood Urea Nitrogen 20 MG/DL 


 


Creatinine 0.56 MG/DL 


 


Random Glucose 100 MG/DL 


 


Total Protein 8.1 GM/DL 


 


Albumin 2.8 GM/DL 


 


Calcium Level 9.0 MG/DL 


 


Alkaline Phosphatase 190 U/L 


 


Aspartate Amino Transf


(AST/SGOT) 48 U/L 


 


 


Alanine Aminotransferase


(ALT/SGPT) 47 U/L 


 


 


Total Bilirubin 0.4 MG/DL 


 


Sodium Level 139 MEQ/L 


 


Potassium Level 4.1 MEQ/L 


 


Chloride Level 104 MEQ/L 


 


Carbon Dioxide Level 26.2 MEQ/L 


 


Anion Gap 9 MEQ/L 


 


Estimat Glomerular Filtration


Rate 104 ML/MIN 


 


 


Total Creatine Kinase 33 U/L 


 


Troponin I 0.05 NG/ML 











MDM


Medical Decision Making


Medical Screen Exam Complete:  Yes


Emergency Medical Condition:  Yes


Differential Diagnosis


A. fib a flutter with RVR versus metabolic derangement versus malfunctioning G-

tube


Narrative Course


82-year-old female sent from the nursing home for malfunctioning G-tube.  

Patient also noted to be in A. fib with RVR.  The patient's been placed on a 

Cardizem drip.  She will be admitted to the hospital and have a consult for the 

replacement of the G-tube.  I am unfamiliar with this type of G-tube therefore 

I could not replace it at the bedside.





Diagnosis





 Primary Impression:  


 Atrial fibrillation with RVR


 Additional Impressions:  


 Dementia


 Malfunction of gastrostomy tube





Admitting Information


Admitting Physician Requests:  Admit











Horace Kc MD Mar 24, 2018 15:34

## 2018-03-25 VITALS — HEART RATE: 80 BPM

## 2018-03-25 VITALS
RESPIRATION RATE: 14 BRPM | DIASTOLIC BLOOD PRESSURE: 82 MMHG | HEART RATE: 76 BPM | SYSTOLIC BLOOD PRESSURE: 136 MMHG | OXYGEN SATURATION: 100 %

## 2018-03-25 VITALS
RESPIRATION RATE: 20 BRPM | DIASTOLIC BLOOD PRESSURE: 79 MMHG | SYSTOLIC BLOOD PRESSURE: 132 MMHG | OXYGEN SATURATION: 97 % | HEART RATE: 72 BPM

## 2018-03-25 VITALS
HEART RATE: 70 BPM | DIASTOLIC BLOOD PRESSURE: 75 MMHG | RESPIRATION RATE: 16 BRPM | OXYGEN SATURATION: 97 % | SYSTOLIC BLOOD PRESSURE: 142 MMHG

## 2018-03-25 VITALS
TEMPERATURE: 97.8 F | OXYGEN SATURATION: 95 % | HEART RATE: 81 BPM | DIASTOLIC BLOOD PRESSURE: 83 MMHG | RESPIRATION RATE: 20 BRPM | SYSTOLIC BLOOD PRESSURE: 134 MMHG

## 2018-03-25 VITALS
DIASTOLIC BLOOD PRESSURE: 63 MMHG | SYSTOLIC BLOOD PRESSURE: 145 MMHG | HEART RATE: 75 BPM | OXYGEN SATURATION: 98 % | RESPIRATION RATE: 16 BRPM

## 2018-03-25 VITALS
OXYGEN SATURATION: 96 % | RESPIRATION RATE: 18 BRPM | SYSTOLIC BLOOD PRESSURE: 116 MMHG | HEART RATE: 66 BPM | TEMPERATURE: 98.1 F | DIASTOLIC BLOOD PRESSURE: 71 MMHG

## 2018-03-25 VITALS
OXYGEN SATURATION: 98 % | SYSTOLIC BLOOD PRESSURE: 158 MMHG | HEART RATE: 78 BPM | RESPIRATION RATE: 16 BRPM | DIASTOLIC BLOOD PRESSURE: 75 MMHG

## 2018-03-25 VITALS — HEART RATE: 78 BPM

## 2018-03-25 VITALS — HEART RATE: 71 BPM

## 2018-03-25 VITALS
RESPIRATION RATE: 16 BRPM | DIASTOLIC BLOOD PRESSURE: 91 MMHG | OXYGEN SATURATION: 97 % | HEART RATE: 80 BPM | SYSTOLIC BLOOD PRESSURE: 126 MMHG

## 2018-03-25 VITALS
SYSTOLIC BLOOD PRESSURE: 131 MMHG | RESPIRATION RATE: 20 BRPM | HEART RATE: 90 BPM | DIASTOLIC BLOOD PRESSURE: 64 MMHG | OXYGEN SATURATION: 94 %

## 2018-03-25 VITALS
OXYGEN SATURATION: 98 % | RESPIRATION RATE: 16 BRPM | DIASTOLIC BLOOD PRESSURE: 62 MMHG | HEART RATE: 69 BPM | SYSTOLIC BLOOD PRESSURE: 119 MMHG

## 2018-03-25 VITALS — HEART RATE: 66 BPM

## 2018-03-25 VITALS — HEART RATE: 64 BPM

## 2018-03-25 LAB
ALBUMIN SERPL-MCNC: 2.7 GM/DL (ref 3.4–5)
ALP SERPL-CCNC: 186 U/L (ref 45–117)
ALT SERPL-CCNC: 47 U/L (ref 10–53)
AMORPHOUS SEDIMENT, URINE: (no result)
AST SERPL-CCNC: 41 U/L (ref 15–37)
BACTERIA #/AREA URNS HPF: (no result) /HPF
BASOPHILS # BLD AUTO: 0.1 TH/MM3 (ref 0–0.2)
BASOPHILS NFR BLD: 1 % (ref 0–2)
BILIRUB SERPL-MCNC: 0.6 MG/DL (ref 0.2–1)
BUN SERPL-MCNC: 17 MG/DL (ref 7–18)
CALCIUM SERPL-MCNC: 8.9 MG/DL (ref 8.5–10.1)
CHLORIDE SERPL-SCNC: 103 MEQ/L (ref 98–107)
COLOR UR: YELLOW
CREAT SERPL-MCNC: 0.52 MG/DL (ref 0.5–1)
EOSINOPHIL # BLD: 0.3 TH/MM3 (ref 0–0.4)
EOSINOPHIL NFR BLD: 2.9 % (ref 0–4)
ERYTHROCYTE [DISTWIDTH] IN BLOOD BY AUTOMATED COUNT: 16.8 % (ref 11.6–17.2)
GFR SERPLBLD BASED ON 1.73 SQ M-ARVRAT: 113 ML/MIN (ref 89–?)
GLUCOSE SERPL-MCNC: 96 MG/DL (ref 74–106)
GLUCOSE UR STRIP-MCNC: (no result) MG/DL
HCO3 BLD-SCNC: 26.3 MEQ/L (ref 21–32)
HCT VFR BLD CALC: 34.2 % (ref 35–46)
HGB BLD-MCNC: 11.1 GM/DL (ref 11.6–15.3)
HGB UR QL STRIP: (no result)
KETONES UR STRIP-MCNC: (no result) MG/DL
LYMPHOCYTES # BLD AUTO: 2 TH/MM3 (ref 1–4.8)
LYMPHOCYTES NFR BLD AUTO: 20.3 % (ref 9–44)
MCH RBC QN AUTO: 26.2 PG (ref 27–34)
MCHC RBC AUTO-ENTMCNC: 32.6 % (ref 32–36)
MCV RBC AUTO: 80.4 FL (ref 80–100)
MONOCYTE #: 0.8 TH/MM3 (ref 0–0.9)
MONOCYTES NFR BLD: 8 % (ref 0–8)
MUCOUS THREADS #/AREA URNS LPF: (no result) /LPF
NEUTROPHILS # BLD AUTO: 6.6 TH/MM3 (ref 1.8–7.7)
NEUTROPHILS NFR BLD AUTO: 67.8 % (ref 16–70)
NITRITE UR QL STRIP: (no result)
PLATELET # BLD: 411 TH/MM3 (ref 150–450)
PMV BLD AUTO: 10 FL (ref 7–11)
PROT SERPL-MCNC: 7.9 GM/DL (ref 6.4–8.2)
RBC # BLD AUTO: 4.25 MIL/MM3 (ref 4–5.3)
SODIUM SERPL-SCNC: 138 MEQ/L (ref 136–145)
SP GR UR STRIP: 1.02 (ref 1–1.03)
SQUAMOUS #/AREA URNS HPF: 1 /HPF (ref 0–5)
TRANS CELLS #/AREA URNS HPF: <1 /HPF
TROPONIN I SERPL-MCNC: 0.05 NG/ML (ref 0.02–0.05)
URINE LEUKOCYTE ESTERASE: (no result)
WBC # BLD AUTO: 9.7 TH/MM3 (ref 4–11)

## 2018-03-25 RX ADMIN — STANDARDIZED SENNA CONCENTRATE AND DOCUSATE SODIUM SCH TAB: 8.6; 5 TABLET, FILM COATED ORAL at 20:46

## 2018-03-25 RX ADMIN — MAGNESIUM SULFATE HEPTAHYDRATE SCH MLS/HR: 500 INJECTION, SOLUTION INTRAMUSCULAR; INTRAVENOUS at 10:09

## 2018-03-25 RX ADMIN — STANDARDIZED SENNA CONCENTRATE AND DOCUSATE SODIUM SCH TAB: 8.6; 5 TABLET, FILM COATED ORAL at 09:00

## 2018-03-25 RX ADMIN — Medication SCH ML: at 20:46

## 2018-03-25 RX ADMIN — SODIUM CHLORIDE PRN MLS/HR: 900 INJECTION, SOLUTION INTRAVENOUS at 04:58

## 2018-03-25 RX ADMIN — MAGNESIUM SULFATE HEPTAHYDRATE SCH MLS/HR: 500 INJECTION, SOLUTION INTRAMUSCULAR; INTRAVENOUS at 20:00

## 2018-03-25 RX ADMIN — PHENYTOIN SODIUM SCH MLS/HR: 50 INJECTION INTRAMUSCULAR; INTRAVENOUS at 02:45

## 2018-03-25 RX ADMIN — Medication SCH ML: at 09:00

## 2018-03-25 NOTE — HHI.FPPN
Subjective


Remarks


No acute events overnight.  Vital signs unremarkable.  Due to patient's dementia

, she is unable to communicate this morning.  No concerns by the nurse.


 (Lucia Huff MD, R3)





Objective


Vitals





Vital Signs








  Date Time  Temp Pulse Resp B/P (MAP) Pulse Ox O2 Delivery O2 Flow Rate FiO2


 


3/25/18 12:31     (86)    


 


3/25/18 09:56  76  136/63    


 


3/25/18 09:00  90 20 131/64 (86) 94 Room Air  


 


3/25/18 08:21  69 16 119/62 (81) 98 Room Air  


 


3/25/18 06:00  80 16 126/91 (103) 97 Room Air  


 


3/25/18 04:58  92  155/86    


 


3/25/18 04:00  78 16 158/75 (102) 98 Room Air  


 


3/25/18 03:00  76 14 136/82 (100) 100 Room Air  


 


3/25/18 00:59  75 16 145/63 (90) 98 Room Air  


 


3/24/18 20:44  75 16 132/72 (92) 96 Room Air  


 


3/24/18 18:48  96 16 139/78 (98) 95 Room Air  


 


3/24/18 18:30  130  139/67    


 


3/24/18 17:32  104 18 127/95 (106) 98 Room Air  


 


3/24/18 16:06  104  136/68    


 


3/24/18 14:40  116 18  97 Room Air  


 


3/24/18 14:37 98.1 127 18 151/66 (94) 97   














I/O      


 


 3/24/18 3/24/18 3/24/18 3/25/18 3/25/18 3/25/18





 07:00 15:00 23:00 07:00 15:00 23:00


 


Intake Total   500 ml  700 ml 


 


Balance   500 ml  700 ml 


 


      


 


Intake IV Total   500 ml  700 ml 


 


# Voids     1 


 


# Bowel Movements     1 








 (Lucia Huff MD, R3)


Result Diagram:  


3/25/18 0350                                                                   

             3/25/18 0350





Objective Remarks


GEN: Thin, malnourished patient resting comfortably in bed.  Unable to 

communicate or answer any questions.  Soft restraints currently in place but 

patient is still able to reach PEG tube.


SKIN: Unstageable left foot wound spanning entire heel


ENT: Tooth decay noted. Exceptionally dry mouth. Poor oral care. 


CV: Regular rate and rhythm without obvious murmurs


LUNGS: Anterior lung fields clear to auscultation bilaterally. Normal 

respiratory effort. No wheezes, rales, rhonchi.


GASTROINTESTINAL: PEG tube in place with surrounding dried blood but without 

skin erythema, drainage, pus present. Patient says "ow" upon examination. 

Abdomen otherwise soft, non-tender, nondistended. No hepato-splenomegaly, or 

palpable masses. 


EXT: No edema. No calf tenderness.


NEURO/PSYCH: Awake, alert but confused and unable to follow commands or answer 

questions.


 (Lucia Huff MD, R3)





A/P


Assessment and Plan


Patient is an 82 year old male with history of atrial fibrillation, dementia, 

failure to thrive, and recent orthopedic procedure of LLE who presents from 

[facility] for workup of gastrostomy tube malfunction. On ED evaluation she 

was noted to have atrial fibrillation with RVR to rate 150s that has been rate 

controlled with Cardizem drip.  Admitted to [facility] for further A. fib management 

and correction of PEG tube.


Discharge Planning


1-2days pending rate control and correction of malfunctioning PEG tube





sdw Dr. Tobias, Dr. Jeffries, and Dr. Ferrara


 (Lucia Huff MD, R3)


Attending Attestation


Round table detailed discussion regarding patients admission and hospital 

course was held this morning


Dr Chapman, Dr KATLYN Tobias, Dr Ferrara and Dr Bonds present during discussion


EMR reviewedPatient seen and examined with team


Agree with contents of above note


See Orders


 (Yeyo Jeffries MD)


Problem List:  


(1) Atrial fibrillation with RVR


ICD Codes:  I48.91 - Unspecified atrial fibrillation


Status:  Acute


Plan:  Patient diagnosed with atrial fibrillation with RVR during last 

hospitalization (Feb to Mar 2018). This was related to fall resulting in LLE 

fracture, with diagnosis failure to thrive. She was discharged on cardizem PO 

but it is unclear if she has been receiving this (given G-tube malfunction 

timing is not clear and med rec not available). 


* Once med rec completed and GT functional, will transition patient back on to 

Cardizem p.o. dosing


* Continue Cardizem drip at this time


* Monitor vital signs closely and telemetry


* Troponins stable


* TSH slightly suppressed, repeat 3/26 with free T4





(2) Malfunction of gastrostomy tube


ICD Codes:  K94.23 - Gastrostomy malfunction


Status:  Acute


Plan:  IR consulted for evaluation. Spoke with Dr. Dr. Nolasco who stated that 

PEG tube will not be able to be evaluated today, will evaluate 3/26/2018


-Fluids for the time being


-no concern for acute infection/cellulitis at this time





(3) Failure to thrive in adult


ICD Codes:  R62.7 - Adult failure to thrive


Status:  Chronic


Plan:  Patient has G-tube, malfunctioning at this time, aggressive measures 

desired by family, will reevaluate G tube


-Weight stable since last hospital discharge on 3/2/2018





(4) Encounter for wound care


ICD Codes:  Z51.89 - Encounter for other specified aftercare


Status:  Acute


Plan:  She was noted to have unstageable left heel ulcer, ulcers 1st degree on 

buttocks


-wound care consulted, appreciate recommendations





(5) Dementia


ICD Codes:  F03.90 - Unspecified dementia without behavioral disturbance


Status:  Chronic


Plan:  Chronic severe dementia noted. Redirect, soft restraints ordered given 

patient pulled out IV and try to pull out G-tube in ED


-Health care surrogate per nursing home documentation is Prashanth Sidhu, phone 

number [phone], relationship is son-in-law.


-oral care ordered





(6) FEN/GI/PPx


Status:  Acute


Plan:  


Fluids:D51/2 @ 100ml/hr


* consulted dietician to get tube refeeds readjusted for bolus feedings rather 

than continuous


Electrolytes: monitor and replete as needed


Nutrition: NPO


DVT Prophylaxis: Hold pharmacologic anticoagulation given pre-procedure, once 

completed, will need heparin SQ


GI Prophylaxis: none


PRN anti-HTN: Vasotec 1.25mg qhr IV PRN for SBP > 180/ and/or DBP > 100





Case Mgmt consulted given coming from SNF


 (Lucia Huff MD, R3)











Lucia Huff MD, R3 Mar 25, 2018 13:33


Yeyo Jeffries MD Mar 25, 2018 20:28

## 2018-03-26 VITALS
DIASTOLIC BLOOD PRESSURE: 76 MMHG | OXYGEN SATURATION: 96 % | RESPIRATION RATE: 16 BRPM | HEART RATE: 80 BPM | SYSTOLIC BLOOD PRESSURE: 131 MMHG

## 2018-03-26 VITALS
SYSTOLIC BLOOD PRESSURE: 116 MMHG | HEART RATE: 74 BPM | TEMPERATURE: 97.7 F | RESPIRATION RATE: 18 BRPM | DIASTOLIC BLOOD PRESSURE: 75 MMHG | OXYGEN SATURATION: 97 %

## 2018-03-26 VITALS
HEART RATE: 83 BPM | OXYGEN SATURATION: 98 % | SYSTOLIC BLOOD PRESSURE: 141 MMHG | RESPIRATION RATE: 18 BRPM | TEMPERATURE: 97.9 F | DIASTOLIC BLOOD PRESSURE: 77 MMHG

## 2018-03-26 VITALS
SYSTOLIC BLOOD PRESSURE: 138 MMHG | TEMPERATURE: 98.2 F | OXYGEN SATURATION: 97 % | DIASTOLIC BLOOD PRESSURE: 89 MMHG | RESPIRATION RATE: 18 BRPM | HEART RATE: 80 BPM

## 2018-03-26 VITALS — HEART RATE: 84 BPM

## 2018-03-26 VITALS — HEART RATE: 72 BPM

## 2018-03-26 VITALS
HEART RATE: 77 BPM | DIASTOLIC BLOOD PRESSURE: 73 MMHG | TEMPERATURE: 97.6 F | RESPIRATION RATE: 16 BRPM | OXYGEN SATURATION: 99 % | SYSTOLIC BLOOD PRESSURE: 125 MMHG

## 2018-03-26 VITALS
SYSTOLIC BLOOD PRESSURE: 139 MMHG | TEMPERATURE: 97.5 F | HEART RATE: 91 BPM | OXYGEN SATURATION: 100 % | DIASTOLIC BLOOD PRESSURE: 74 MMHG | RESPIRATION RATE: 16 BRPM

## 2018-03-26 VITALS — HEART RATE: 102 BPM

## 2018-03-26 VITALS — HEART RATE: 76 BPM

## 2018-03-26 VITALS — HEART RATE: 78 BPM

## 2018-03-26 VITALS — HEART RATE: 74 BPM

## 2018-03-26 VITALS — HEART RATE: 66 BPM

## 2018-03-26 VITALS — HEART RATE: 88 BPM

## 2018-03-26 VITALS — HEART RATE: 73 BPM

## 2018-03-26 VITALS — HEART RATE: 77 BPM

## 2018-03-26 VITALS — HEART RATE: 96 BPM

## 2018-03-26 VITALS — HEART RATE: 86 BPM

## 2018-03-26 VITALS — HEART RATE: 82 BPM

## 2018-03-26 LAB
BUN SERPL-MCNC: 9 MG/DL (ref 7–18)
CALCIUM SERPL-MCNC: 8.8 MG/DL (ref 8.5–10.1)
CHLORIDE SERPL-SCNC: 102 MEQ/L (ref 98–107)
CREAT SERPL-MCNC: 0.41 MG/DL (ref 0.5–1)
ERYTHROCYTE [DISTWIDTH] IN BLOOD BY AUTOMATED COUNT: 17 % (ref 11.6–17.2)
GFR SERPLBLD BASED ON 1.73 SQ M-ARVRAT: 149 ML/MIN (ref 89–?)
GLUCOSE SERPL-MCNC: 113 MG/DL (ref 74–106)
HCO3 BLD-SCNC: 25.9 MEQ/L (ref 21–32)
HCT VFR BLD CALC: 32.4 % (ref 35–46)
HGB BLD-MCNC: 10.7 GM/DL (ref 11.6–15.3)
MCH RBC QN AUTO: 26.4 PG (ref 27–34)
MCHC RBC AUTO-ENTMCNC: 33 % (ref 32–36)
MCV RBC AUTO: 80.2 FL (ref 80–100)
PLATELET # BLD: 413 TH/MM3 (ref 150–450)
PMV BLD AUTO: 9.5 FL (ref 7–11)
RBC # BLD AUTO: 4.05 MIL/MM3 (ref 4–5.3)
SODIUM SERPL-SCNC: 136 MEQ/L (ref 136–145)
T4 FREE SERPL-MCNC: 1.43 NG/DL (ref 0.76–1.46)
WBC # BLD AUTO: 8.1 TH/MM3 (ref 4–11)

## 2018-03-26 RX ADMIN — Medication SCH ML: at 09:00

## 2018-03-26 RX ADMIN — POTASSIUM CHLORIDE, DEXTROSE MONOHYDRATE AND SODIUM CHLORIDE SCH MLS/HR: 150; 5; 450 INJECTION, SOLUTION INTRAVENOUS at 13:30

## 2018-03-26 RX ADMIN — ASPIRIN SCH MG: 81 TABLET ORAL at 09:00

## 2018-03-26 RX ADMIN — STANDARDIZED SENNA CONCENTRATE AND DOCUSATE SODIUM SCH TAB: 8.6; 5 TABLET, FILM COATED ORAL at 09:00

## 2018-03-26 RX ADMIN — SODIUM CHLORIDE PRN MLS/HR: 900 INJECTION, SOLUTION INTRAVENOUS at 22:01

## 2018-03-26 RX ADMIN — STANDARDIZED SENNA CONCENTRATE AND DOCUSATE SODIUM SCH TAB: 8.6; 5 TABLET, FILM COATED ORAL at 21:00

## 2018-03-26 RX ADMIN — Medication SCH ML: at 22:00

## 2018-03-26 RX ADMIN — MAGNESIUM SULFATE HEPTAHYDRATE SCH MLS/HR: 500 INJECTION, SOLUTION INTRAMUSCULAR; INTRAVENOUS at 05:10

## 2018-03-26 NOTE — PD.WCN.NOT
Wound Consult


Description:


Received wound management consult from Doctor Saranya Tobias for L buttock (1st 

degree), Left heel unstageable


Communicated with:


RN Rustam and Doctor Saranya Tobias


Recommendation:


1.Please cleanse buttock area gently with Remedy barrier wipes and apply thick 

layer of Calazime skin protectant paste BID and PRN


2. Turn patient every 2 hours and PRN for comfort and offloading of pressure 

from stacie prominences.


3. Apply skin prep to L heel Deep tissue injury and leave open to air.


4. Offload pressure from bilateral heels with heel raiser boots or float heels 

on pillows


Additional Information:


Patient seen on [facility] for evaluation of L buttock and L heel. Patient assessed 

with Rustam [facility] and writer. Patient is confused and unable to follow command. 

Speech is garbled. Patient was turned to L side for buttock assessment. Patient 

is noted with macerated, denuded skin with partial thickness skin loss to 

gluteal cleft and perianal area.Etiology appears to be moisture or incontinence 

associated dermatitis. Patient's buttock area was cleansed with bath wipes. 

Applied Thick layer of Calazime barrier cream. Removed thick cloth, soiled 

ultrasorb pads under patient and replaced to 2 staggered ultrasorb pads. 

Patient was positioned off bottom with pillow in place for support. Leigh Ann has 

her own heel raiser boots on. Removed both heel raiser boots. L heel presents 

with deflated blood blister, indicating deep tissue injury. DTI appears to be 

resolving. Skin barrier film (skin prep) was sprayed to DTI. Wound was left 

open to air.R heel presents with intact skin. Heel raiser boots reapplied.











Janneth Dillard [facility] Mar 26, 2018 16:39

## 2018-03-26 NOTE — HHI.FPPN
Subjective


Remarks


Patient was seen and examined this morning. She has severe dementia and is 

minimally responsive to questions but is alert. She does not appear to be in 

pain.





Objective


Vitals





Vital Signs








  Date Time  Temp Pulse Resp B/P (MAP) Pulse Ox O2 Delivery O2 Flow Rate FiO2


 


3/26/18 09:01 97.7 74 18 116/75 (89) 97   


 


3/26/18 06:00  96      


 


3/26/18 05:00  86      


 


3/26/18 04:00  85 16 131/76 (94) 96   


 


3/26/18 04:00  80      


 


3/26/18 03:00  73      


 


3/26/18 02:00  88      


 


3/26/18 01:00  74      


 


3/26/18 00:00  66      


 


3/25/18 23:50  70 16 142/75 (97) 97   


 


3/25/18 23:00  80      


 


3/25/18 22:00  78      


 


3/25/18 21:00  80      


 


3/25/18 20:00  80      


 


3/25/18 19:00 97.8 76 20 134/83 (100) 95   


 


3/25/18 19:00  81      


 


3/25/18 18:05  71      


 


3/25/18 16:00  64      


 


3/25/18 15:16 98.1 66 18 116/71 (86) 96   


 


3/25/18 15:00  78      


 


3/25/18 14:00  66      


 


3/25/18 13:15  72 20 132/79 (96) 97   


 


3/25/18 13:00  78      


 


3/25/18 12:31     (86)    














I/O      


 


 3/25/18 3/25/18 3/25/18 3/26/18 3/26/18 3/26/18





 07:00 15:00 23:00 07:00 15:00 23:00


 


Intake Total  700 ml 1000 ml 1000 ml  


 


Balance  700 ml 1000 ml 1000 ml  


 


      


 


Intake Oral    0 ml  


 


IV Total  700 ml 1000 ml 1000 ml  


 


# Voids  1  2  


 


# Bowel Movements  1  1  








Result Diagram:  


3/26/18 0721                                                                   

             3/26/18 0721





Imaging





Last Impressions








Chest X-Ray 3/24/18 1648 Signed





Impressions: 





 Service Date/Time:  Saturday, March 24, 2018 16:58 - CONCLUSION: No acute 





 disease.       Don Retana MD  FACR








Objective Remarks


GEN: Thin, malnourished patient resting comfortably in bed.  Unable to 

communicate or answer any questions.  Soft restraints currently in place.


SKIN: Unstageable left foot wound spanning entire heel


ENT: Tooth decay noted. Exceptionally dry mouth. Poor oral care. 


CV: Regular rate and rhythm without obvious murmurs


LUNGS: Anterior lung fields clear to auscultation bilaterally. Normal 

respiratory effort. No wheezes, rales, rhonchi.


GASTROINTESTINAL: PEG tube in place with surrounding dried blood but without 

skin erythema, drainage, or pus. Abdomen otherwise soft, non-tender, 

nondistended. No hepato-splenomegaly, or palpable masses. 


EXT: No edema. No calf tenderness.


NEURO/PSYCH: Awake, alert but confused and unable to follow commands or answer 

questions.


Medications and IVs





Inpatient Medications


Acetaminophen 100 ml @  400 mls/hr Q6H  PRN IV pain;  Start 3/25/18 at 15:00


Aspirin (Ecotrin Ec) 81 mg DAILY PO ;  Start 3/26/18 at 09:00


Bisacodyl (Dulcolax Supp) 10 mg DAILY  PRN RECTAL SEVERE CONSITIPATION;  Start 3

/24/18 at 18:15


Dextrose/Sodium Chloride 1,000 ml @  100 mls/hr Q10H IV  Last administered on 3/

26/18at 05:10;  Start 3/25/18 at 10:00


Diltiazem HCl (Cardizem Inj) 17 mg ONCE  ONCE IV  Last administered on 3/24/

18at 15:14;  Start 3/24/18 at 15:15;  Stop 3/24/18 at 15:16;  Status DC


Diltiazem HCl 125 mg/Sodium Chloride 125 ml @ 5 mls/hr TITRATE  PRN IV 

tachycardia Last administered on 3/25/18at 04:58;  Start 3/24/18 at 14:45


Heparin Sodium (Porcine) (Heparin Inj) 5,000 units Q8HR SQ  Last administered 

on 3/25/18at 21:38;  Start 3/25/18 at 23:30;  Stop 3/25/18 at 23:31;  Status DC


Lactulose (Lactulose Liq) 30 ml DAILY  PRN PO SEVERE CONSITIPATION;  Start 3/24/

18 at 18:15


Magnesium Hydroxide (Milk Of Magnesia Liq) 30 ml Q12H  PRN PO Mild constipation

;  Start 3/24/18 at 18:15


Naloxone HCl (Narcan Inj) 0.4 mg UNSCH  PRN IV PUSH SEE LABEL COMMENTS;  Start 3

/24/18 at 18:15


Ondansetron HCl (Zofran Inj) 4 mg Q6H  PRN IVP NAUSEA OR VOMITING;  Start 3/24/

18 at 18:15


Senna/Docusate Sodium (Halina-Colace) 1 tab BID PO ;  Start 3/24/18 at 21:00


Sennosides (Senokot) 17.2 mg Q12H  PRN PO Moderate constipation;  Start 3/24/18 

at 18:15


Sodium Chloride (NS Flush) 2 ml BID IV FLUSH ;  Start 3/24/18 at 21:00;  Stop 3/

24/18 at 21:18;  Status DC


Urinary Catheter:  No


Vascular Central Line Catheter:  No





A/P


Assessment and Plan


Patient is an 82 year old male with history of atrial fibrillation, dementia, 

failure to thrive, and recent orthopedic procedure of LLE who presents from 

[facility] for workup of gastrostomy tube malfunction. On ED evaluation she 

was noted to have atrial fibrillation with RVR to rate 150s that has been rate 

controlled with Cardizem drip.  


Admitted to [facility] for further A. fib management and correction of PEG tube.


3/26: IR to evaluate G tube today. Heart Rate controlled with cardizem gtt. 

Plan is to transition to PO cardizem once G-tube functional. 


Update @ 1240p: per nurse, IR states Gastroenterology put in G-tube and thus 

Gastroenterology must be consulted. Will consult and call GI.


Discharge Planning


1-2days pending rate control and correction of malfunctioning PEG tube





sdw Dr. Tobias, Dr. Jeffries, and Dr. Ferrara


Problem List:  


(1) Atrial fibrillation with RVR


ICD Codes:  I48.91 - Unspecified atrial fibrillation


Status:  Acute


Plan:  Patient diagnosed with atrial fibrillation with RVR during last 

hospitalization (Feb to Mar 2018). This was related to fall resulting in LLE 

fracture, with diagnosis failure to thrive. She was discharged on cardizem PO 

but it is unclear if she has been receiving this (given G-tube malfunction 

timing is not clear and med rec not available). 


* Once med rec completed and GT functional, will transition patient back on to 

Cardizem PO dosing


* Continue Cardizem drip at this time


* Monitor vital signs closely and telemetry


* Troponin stable


* TSH slightly suppressed, repeat 3/26 with free T4 is normal





(2) Malfunction of gastrostomy tube


ICD Codes:  K94.23 - Gastrostomy malfunction


Status:  Acute


Plan:  IR consulted for evaluation. Spoke with Dr. Dr. Nolasco who stated that 

PEG tube plan to evaluate it 3/26/2018


-Fluids for the time being


-no concern for acute infection/cellulitis at this time





(3) Failure to thrive in adult


ICD Codes:  R62.7 - Adult failure to thrive


Status:  Chronic


Plan:  Patient has G-tube, malfunctioning at this time, aggressive measures 

desired by family, will reevaluate G tube


-Weight stable since last hospital discharge on 3/2/2018


-Nutritional consult for bolus feeding to reduce patient focus on G tube (

patient ordered for continuous feeds at time of discharge in early March)





(4) Encounter for wound care


ICD Codes:  Z51.89 - Encounter for other specified aftercare


Status:  Acute


Plan:  She was noted to have unstageable left heel ulcer, ulcers 1st degree on 

buttocks


-wound care consulted, appreciate recommendations





(5) Dementia


ICD Codes:  F03.90 - Unspecified dementia without behavioral disturbance


Status:  Chronic


Plan:  Chronic severe dementia noted. Redirect, soft restraints ordered given 

patient pulled out IV and try to pull out G-tube in ED


-Health care surrogate per nursing home documentation is Prashanth Sidhu, phone 

number [phone], relationship is son-in-law.


-oral care ordered





(6) FEN/GI/PPx


Status:  Acute


Plan:  


Fluids:D51/2 @ 100ml/hr


* consulted dietician to get tube feeds re-adjusted for bolus feedings rather 

than continuous


Electrolytes: monitor and replete as needed


Nutrition: NPO


DVT Prophylaxis: Hold pharmacologic anticoagulation given pre-procedure, once 

completed, will need heparin SQ


GI Prophylaxis: none


PRN anti-HTN: Vasotec 1.25mg qhr IV PRN for SBP > 180/ and/or DBP > 100





Case Mgmt consulted given coming from [facility]














Saranya Tobias MD Mar 26, 2018 10:45

## 2018-03-26 NOTE — RADRPT
EXAM DATE/TIME:  03/26/2018 14:55 

 

[facility] COMPARISON:     

No previous studies available for comparison.

 

                     

INDICATIONS :     

PEG tube placement.

                     

 

MEDICAL HISTORY :            

Carcinoma, bladder.   

 

SURGICAL HISTORY :     

None.   

 

ENCOUNTER:     

Initial                                        

 

ACUITY:     

1 day      

 

PAIN SCORE:     

Non-responsive.

 

LOCATION:       

Abdomen.

 

FINDINGS:     

Contrast was injected via gastrostomy tube. Contrast is in the stomach. There is no extravasation of 
contrast. There is mild gaseous distention of bowel loops throughout the abdomen. Abundant stool pres
ent in the rectum.

 

CONCLUSION:     

Gastrostomy tube injection opacifies the stomach.

 

 

 

 Misael Whiting MD on March 26, 2018 at 17:59           

Board Certified Radiologist.

 This report was verified electronically.

## 2018-03-26 NOTE — PD.CONS
HPI


History of Present Illness


This is a 82 year old F ith severed dementia who was sent by [facility] for 

reports of malfunctioning G-tube. Pt is unable to give any history, therefore 

history is obtained from chart review.  Pt underwent EGD with PEG placement by 

our service on Feb 28th --> Mild gastritis, 20 Thai PEg placed without 

complication.  Biopsy -->Gastric antral mucosal biopsy with moderate to severe 

chronic gastritis negative for intestinal metaplasia and dysplasia, negative 

for H. Pylori.  Unsure whether anyone has tried flushing the PEG tube during 

this hospitalization.  No abdominal imaging.  Site currently with some clear 

drainage from site and erythema surrounding PEG tube.  


 (Viry Craft)





PFSH


Coded Allergies:  


     No Known Allergies (Unverified , 3/25/18)





Review of Systems


Unable to obtain 


 (Viry Craft)





GI Exam


Vitals I&O





Vital Signs








  Date Time  Temp Pulse Resp B/P (MAP) Pulse Ox O2 Delivery O2 Flow Rate FiO2


 


3/26/18 14:01  78      


 


3/26/18 13:00  76      


 


3/26/18 12:01 98.2 80 18 138/89 (105) 97   


 


3/26/18 12:00  82      


 


3/26/18 11:00  73      


 


3/26/18 10:00  72      


 


3/26/18 09:01 97.7 74 18 116/75 (89) 97   


 


3/26/18 09:00  72      


 


3/26/18 08:00  74      


 


3/26/18 07:00  78      


 


3/26/18 06:00  96      


 


3/26/18 05:00  86      


 


3/26/18 04:00  85 16 131/76 (94) 96   


 


3/26/18 04:00  80      


 


3/26/18 03:00  73      


 


3/26/18 02:00  88      


 


3/26/18 01:00  74      


 


3/26/18 00:00  66      


 


3/25/18 23:50  70 16 142/75 (97) 97   


 


3/25/18 23:00  80      


 


3/25/18 22:00  78      


 


3/25/18 21:00  80      


 


3/25/18 20:00  80      


 


3/25/18 19:00 97.8 76 20 134/83 (100) 95   


 


3/25/18 19:00  81      


 


3/25/18 18:05  71      


 


3/25/18 16:00  64      


 


3/25/18 15:16 98.1 66 18 116/71 (86) 96   


 


3/25/18 15:00  78      














I/O      


 


 3/25/18 3/25/18 3/25/18 3/26/18 3/26/18 3/26/18





 06:59 14:59 22:59 06:59 14:59 22:59


 


Intake Total  700 ml 1000 ml 1000 ml  


 


Balance  700 ml 1000 ml 1000 ml  


 


      


 


Intake Oral    0 ml  


 


IV Total  700 ml 1000 ml 1000 ml  


 


# Voids  1  2  


 


# Bowel Movements  1  1  








Imaging





Last Impressions








Chest X-Ray 3/24/18 1648 Signed





Impressions: 





 Service Date/Time:  Saturday, March 24, 2018 16:58 - CONCLUSION: No acute 





 disease.       Don Retana MD  FACR








Laboratory











Test


  3/26/18


07:21


 


White Blood Count 8.1 TH/MM3 


 


Red Blood Count 4.05 MIL/MM3 


 


Hemoglobin 10.7 GM/DL 


 


Hematocrit 32.4 % 


 


Mean Corpuscular Volume 80.2 FL 


 


Mean Corpuscular Hemoglobin 26.4 PG 


 


Mean Corpuscular Hemoglobin


Concent 33.0 % 


 


 


Red Cell Distribution Width 17.0 % 


 


Platelet Count 413 TH/MM3 


 


Mean Platelet Volume 9.5 FL 


 


Blood Urea Nitrogen 9 MG/DL 


 


Creatinine 0.41 MG/DL 


 


Random Glucose 113 MG/DL 


 


Calcium Level 8.8 MG/DL 


 


Sodium Level 136 MEQ/L 


 


Potassium Level 3.1 MEQ/L 


 


Chloride Level 102 MEQ/L 


 


Carbon Dioxide Level 25.9 MEQ/L 


 


Anion Gap 8 MEQ/L 


 


Estimat Glomerular Filtration


Rate 149 ML/MIN 


 


 


Free Thyroxine 1.43 NG/DL 


 


Thyroid Stimulating Hormone


3rd Gen 0.560 uIU/ML 


 














 Date/Time


Source Procedure


Growth Status


 


 


 3/25/18 09:18


Urine Catheterized Urine Urine Culture - Preliminary


NO GROWTH IN 24 HOURS. Resulted








Physical Examination


HEENT: Normocephalic; atraumatic


CHEST:  Even/unlabored 


CARDIAC:  Irregular 


ABDOMEN:  Soft, nondistended, bowel sounds active. PEG tube clamped, site with 

clear drainage and surrounding erythema 


CNS: Awake, confused 


 (Viry Craft)





Assessment and Plan


Plan


Assessment:


- Possible G-tube malfunction- sent by [facility] for evaluation, per chart 

her


  son is unhappy with how the site looks. Clear drainage with surrounding 

erythema


  at the site. Unsure if anyone has tried flushing PEG tube


  Will order KUB with Gastrografin through PEG, keep NPO after MN pending 

results


  of KUB may need EGD with PEG replacement tomorrow


- A fib RVR on admission- now rate controlled on Cardizem gtt


- Advanced dementia 





Plan:


KUB with Gastrografin


NPO


Obtain consent incase there is need for EGD with PEG replacement tomorrow 

pending KUB results


Wound culture from PEG site


Further recommendations based on findings of above





Pt has been seen and examined by myself and Dr. Soto and this note is 

satish on his behalf 


 (Viry Craft)


Physician Comments


Patient seen and examined


Agree with above


Continue with current supportive care


Monitor lab


The Gastrografin injection through the PEG tube reveals the PEG tube to be 

functioning well and within the stomach


Skin care required at PEG site with lotions and keeping it clean and dry


Not much to add from a GI perspective


We will sign off


 (Buddy Soto MD)











Viry Craft Mar 26, 2018 14:46


Buddy Soto MD Mar 26, 2018 22:33

## 2018-03-27 VITALS
OXYGEN SATURATION: 99 % | DIASTOLIC BLOOD PRESSURE: 73 MMHG | HEART RATE: 92 BPM | RESPIRATION RATE: 18 BRPM | SYSTOLIC BLOOD PRESSURE: 124 MMHG | TEMPERATURE: 98.2 F

## 2018-03-27 VITALS — HEART RATE: 100 BPM

## 2018-03-27 VITALS
SYSTOLIC BLOOD PRESSURE: 112 MMHG | OXYGEN SATURATION: 97 % | DIASTOLIC BLOOD PRESSURE: 59 MMHG | HEART RATE: 82 BPM | RESPIRATION RATE: 18 BRPM | TEMPERATURE: 98.7 F

## 2018-03-27 VITALS
SYSTOLIC BLOOD PRESSURE: 133 MMHG | TEMPERATURE: 98.3 F | DIASTOLIC BLOOD PRESSURE: 77 MMHG | OXYGEN SATURATION: 97 % | HEART RATE: 74 BPM | RESPIRATION RATE: 16 BRPM

## 2018-03-27 VITALS — HEART RATE: 76 BPM

## 2018-03-27 VITALS
OXYGEN SATURATION: 98 % | HEART RATE: 74 BPM | DIASTOLIC BLOOD PRESSURE: 66 MMHG | TEMPERATURE: 99 F | SYSTOLIC BLOOD PRESSURE: 111 MMHG | RESPIRATION RATE: 16 BRPM

## 2018-03-27 VITALS — HEART RATE: 80 BPM

## 2018-03-27 VITALS
TEMPERATURE: 98.5 F | RESPIRATION RATE: 16 BRPM | DIASTOLIC BLOOD PRESSURE: 78 MMHG | SYSTOLIC BLOOD PRESSURE: 138 MMHG | HEART RATE: 89 BPM

## 2018-03-27 VITALS — HEART RATE: 71 BPM

## 2018-03-27 VITALS — HEART RATE: 98 BPM

## 2018-03-27 VITALS — HEART RATE: 70 BPM

## 2018-03-27 VITALS — HEART RATE: 84 BPM

## 2018-03-27 VITALS
DIASTOLIC BLOOD PRESSURE: 90 MMHG | HEART RATE: 105 BPM | TEMPERATURE: 98.6 F | RESPIRATION RATE: 18 BRPM | OXYGEN SATURATION: 99 % | SYSTOLIC BLOOD PRESSURE: 158 MMHG

## 2018-03-27 VITALS
SYSTOLIC BLOOD PRESSURE: 145 MMHG | OXYGEN SATURATION: 98 % | HEART RATE: 80 BPM | DIASTOLIC BLOOD PRESSURE: 62 MMHG | TEMPERATURE: 98.5 F | RESPIRATION RATE: 18 BRPM

## 2018-03-27 VITALS — HEART RATE: 77 BPM

## 2018-03-27 VITALS — HEART RATE: 74 BPM

## 2018-03-27 VITALS — HEART RATE: 67 BPM

## 2018-03-27 VITALS — HEART RATE: 83 BPM

## 2018-03-27 VITALS — HEART RATE: 79 BPM

## 2018-03-27 VITALS — HEART RATE: 82 BPM

## 2018-03-27 VITALS — HEART RATE: 94 BPM

## 2018-03-27 VITALS — HEART RATE: 102 BPM

## 2018-03-27 LAB
BASOPHILS # BLD AUTO: 0.1 TH/MM3 (ref 0–0.2)
BASOPHILS NFR BLD: 1.1 % (ref 0–2)
BUN SERPL-MCNC: 6 MG/DL (ref 7–18)
CALCIUM SERPL-MCNC: 8.5 MG/DL (ref 8.5–10.1)
CHLORIDE SERPL-SCNC: 105 MEQ/L (ref 98–107)
CREAT SERPL-MCNC: 0.43 MG/DL (ref 0.5–1)
EOSINOPHIL # BLD: 0.4 TH/MM3 (ref 0–0.4)
EOSINOPHIL NFR BLD: 4.9 % (ref 0–4)
ERYTHROCYTE [DISTWIDTH] IN BLOOD BY AUTOMATED COUNT: 17.1 % (ref 11.6–17.2)
GFR SERPLBLD BASED ON 1.73 SQ M-ARVRAT: 141 ML/MIN (ref 89–?)
GLUCOSE SERPL-MCNC: 101 MG/DL (ref 74–106)
HCO3 BLD-SCNC: 24.4 MEQ/L (ref 21–32)
HCT VFR BLD CALC: 33.2 % (ref 35–46)
HGB BLD-MCNC: 10.9 GM/DL (ref 11.6–15.3)
LYMPHOCYTES # BLD AUTO: 1.5 TH/MM3 (ref 1–4.8)
LYMPHOCYTES NFR BLD AUTO: 18.1 % (ref 9–44)
MCH RBC QN AUTO: 26.2 PG (ref 27–34)
MCHC RBC AUTO-ENTMCNC: 32.8 % (ref 32–36)
MCV RBC AUTO: 79.8 FL (ref 80–100)
MONOCYTE #: 0.7 TH/MM3 (ref 0–0.9)
MONOCYTES NFR BLD: 8.1 % (ref 0–8)
NEUTROPHILS # BLD AUTO: 5.6 TH/MM3 (ref 1.8–7.7)
NEUTROPHILS NFR BLD AUTO: 67.8 % (ref 16–70)
PLATELET # BLD: 376 TH/MM3 (ref 150–450)
PMV BLD AUTO: 10.1 FL (ref 7–11)
RBC # BLD AUTO: 4.16 MIL/MM3 (ref 4–5.3)
SODIUM SERPL-SCNC: 139 MEQ/L (ref 136–145)
WBC # BLD AUTO: 8.3 TH/MM3 (ref 4–11)

## 2018-03-27 PROCEDURE — 0DP6XUZ REMOVAL OF FEEDING DEVICE FROM STOMACH, EXTERNAL APPROACH: ICD-10-PCS | Performed by: INTERNAL MEDICINE

## 2018-03-27 RX ADMIN — STANDARDIZED SENNA CONCENTRATE AND DOCUSATE SODIUM SCH TAB: 8.6; 5 TABLET, FILM COATED ORAL at 20:41

## 2018-03-27 RX ADMIN — POTASSIUM CHLORIDE, DEXTROSE MONOHYDRATE AND SODIUM CHLORIDE SCH MLS/HR: 150; 5; 450 INJECTION, SOLUTION INTRAVENOUS at 20:41

## 2018-03-27 RX ADMIN — Medication SCH ML: at 20:41

## 2018-03-27 RX ADMIN — STANDARDIZED SENNA CONCENTRATE AND DOCUSATE SODIUM SCH TAB: 8.6; 5 TABLET, FILM COATED ORAL at 07:41

## 2018-03-27 RX ADMIN — POTASSIUM CHLORIDE, DEXTROSE MONOHYDRATE AND SODIUM CHLORIDE SCH MLS/HR: 150; 5; 450 INJECTION, SOLUTION INTRAVENOUS at 01:07

## 2018-03-27 RX ADMIN — POTASSIUM CHLORIDE, DEXTROSE MONOHYDRATE AND SODIUM CHLORIDE SCH MLS/HR: 150; 5; 450 INJECTION, SOLUTION INTRAVENOUS at 10:12

## 2018-03-27 RX ADMIN — ASPIRIN SCH MG: 81 TABLET ORAL at 07:41

## 2018-03-27 RX ADMIN — SODIUM CHLORIDE PRN MLS/HR: 900 INJECTION, SOLUTION INTRAVENOUS at 19:55

## 2018-03-27 RX ADMIN — Medication SCH ML: at 07:41

## 2018-03-27 NOTE — HHI.FPPN
Subjective


Remarks


Patient was seen and examined this morning. She is confused but no overnight 

events. No fevers or complaints at baseline but patient screams when G tube is 

manipulated or when nurse attempts flushing the G-tube.





Objective


Vitals





Vital Signs








  Date Time  Temp Pulse Resp B/P (MAP) Pulse Ox O2 Delivery O2 Flow Rate FiO2


 


3/27/18 09:00  84      


 


3/27/18 08:00  80      


 


3/27/18 07:58 98.5 80 18 145/62 (89) 98   


 


3/27/18 07:00  76      


 


3/27/18 06:00  70      


 


3/27/18 05:00  77      


 


3/27/18 04:00  71      


 


3/27/18 03:30 99.0 74 16 111/66 (81) 98   


 


3/27/18 03:00  67      


 


3/27/18 02:00  76      


 


3/27/18 01:00  79      


 


3/27/18 00:00  77      


 


3/26/18 23:30 97.6 77 16 125/73 (90) 99   


 


3/26/18 23:00  77      


 


3/26/18 22:01  93  139/74    


 


3/26/18 22:00  96      


 


3/26/18 21:00  76      


 


3/26/18 20:00 97.5 91 16 139/74 (95) 100   


 


3/26/18 20:00  74      


 


3/26/18 19:00  78      


 


3/26/18 18:01  84      


 


3/26/18 17:00  88      


 


3/26/18 16:00  84      


 


3/26/18 15:45 97.9 83 18 141/77 (98) 98   


 


3/26/18 15:00  102      


 


3/26/18 14:01  78      


 


3/26/18 13:00  76      


 


3/26/18 12:01 98.2 80 18 138/89 (105) 97   


 


3/26/18 12:00  82      


 


3/26/18 11:00  73      


 


3/26/18 10:00  72      














I/O      


 


 3/26/18 3/26/18 3/26/18 3/27/18 3/27/18 3/27/18





 07:00 15:00 23:00 07:00 15:00 23:00


 


Intake Total 1000 ml   0 ml  


 


Balance 1000 ml   0 ml  


 


      


 


Intake Oral 0 ml   0 ml  


 


IV Total 1000 ml     


 


# Voids 2  4 7  


 


# Bowel Movements 1  1 0  








Result Diagram:  


3/27/18 0614                                                                   

             3/27/18 0614





Imaging





Last Impressions








Abdomen X-Ray 3/26/18 0000 Signed





Impressions: 





 Service Date/Time:  Monday, March 26, 2018 14:55 - CONCLUSION:  Gastrostomy 

tube 





 injection opacifies the stomach.     Misael Whiting MD 


 


Chest X-Ray 3/24/18 1648 Signed





Impressions: 





 Service Date/Time:  Saturday, March 24, 2018 16:58 - CONCLUSION: No acute 





 disease.       Don Retana MD  FACR








Objective Remarks


GEN: Thin, malnourished patient resting comfortably in bed.  Unable to 

communicate or answer any questions.  Soft restraints currently in place.


SKIN: Skin around G tube site erythematous but without pus or overt signs of 

infection.


ENT: Tooth decay noted. Oral care improved.


CV: Regular rate and rhythm without obvious murmurs


LUNGS: Anterior lung fields clear to auscultation bilaterally. Normal 

respiratory effort. No wheezes, rales, rhonchi.


GASTROINTESTINAL: PEG tube in place with surrounding dried blood but without 

skin erythema, drainage, or pus. Abdomen otherwise soft, non-tender, 

nondistended. No hepato-splenomegaly, or palpable masses. 


EXT: Bilateral feet in floaters. No edema. No calf tenderness.


NEURO/PSYCH: Awake, alert but confused and unable to follow commands or answer 

questions.


Medications and IVs





Inpatient Medications


Acetaminophen 100 ml @  400 mls/hr Q6H  PRN IV pain;  Start 3/25/18 at 15:00


Aspirin (Ecotrin Ec) 81 mg DAILY PO ;  Start 3/26/18 at 09:00


Bisacodyl (Dulcolax Supp) 10 mg DAILY  PRN RECTAL SEVERE CONSITIPATION;  Start 3

/24/18 at 18:15


Dextrose/Sodium Chloride 1,000 ml @  100 mls/hr Q10H IV  Last administered on 3/

26/18at 05:10;  Start 3/25/18 at 10:00;  Stop 3/26/18 at 12:53;  Status DC


Diltiazem HCl (Cardizem Inj) 17 mg ONCE  ONCE IV  Last administered on 3/24/

18at 15:14;  Start 3/24/18 at 15:15;  Stop 3/24/18 at 15:16;  Status DC


Diltiazem HCl 125 mg/Sodium Chloride 125 ml @ 5 mls/hr TITRATE  PRN IV 

tachycardia Last administered on 3/26/18at 22:01;  Start 3/24/18 at 14:45


Heparin Sodium (Porcine) (Heparin Inj) 5,000 units Q8HR SQ  Last administered 

on 3/25/18at 21:38;  Start 3/25/18 at 23:30;  Stop 3/25/18 at 23:31;  Status DC


Lactulose (Lactulose Liq) 30 ml DAILY  PRN PO SEVERE CONSITIPATION;  Start 3/24/

18 at 18:15


Magnesium Hydroxide (Milk Of Magnesia Liq) 30 ml Q12H  PRN PO Mild constipation

;  Start 3/24/18 at 18:15


Naloxone HCl (Narcan Inj) 0.4 mg UNSCH  PRN IV PUSH SEE LABEL COMMENTS;  Start 3

/24/18 at 18:15


Ondansetron HCl (Zofran Inj) 4 mg Q6H  PRN IVP NAUSEA OR VOMITING;  Start 3/24/

18 at 18:15


Potassium Chloride/Dextrose/ Sod Cl 1,000 ml @  100 mls/hr Q10H IV  Last 

administered on 3/27/18at 01:07;  Start 3/26/18 at 13:30


Senna/Docusate Sodium (Halina-Colace) 1 tab BID PO ;  Start 3/24/18 at 21:00


Sennosides (Senokot) 17.2 mg Q12H  PRN PO Moderate constipation;  Start 3/24/18 

at 18:15


Sodium Chloride (NS Flush) 2 ml BID IV FLUSH ;  Start 3/24/18 at 21:00;  Stop 3/

24/18 at 21:18;  Status DC


Urinary Catheter:  No


Vascular Central Line Catheter:  No





A/P


Assessment and Plan


Patient is an 82 year old male with history of atrial fibrillation, dementia, 

failure to thrive, and recent orthopedic procedure of LLE who presents from 

[facility] for workup of gastrostomy tube malfunction. On ED evaluation she 

was noted to have atrial fibrillation with RVR to rate 150s that has been rate 

controlled with Cardizem drip.  


Admitted to [facility] for further A. fib management and correction of PEG tube.


3/26: IR to evaluate G tube today. Heart Rate controlled with Cardizem gtt. 

Plan is to transition to PO Cardizem once G-tube functional. Per nurse, IR 

states Gastroenterology put in G-tube and thus Gastroenterology must be 

consulted. Will consult and call GI.


3/27: Gastrografin study showed patency of G tube into stomach. Patient 

continues to have pain with manipulation of the G-tube. Ct abd/pelvis ordered 

after discussion with GI


Discharge Planning


Unclear dispo. Still needs G-tube to be useable. 





dw Dr. Jeffries, and seen with Dr. Ferrara


Problem List:  


(1) Atrial fibrillation with RVR


ICD Codes:  I48.91 - Unspecified atrial fibrillation


Status:  Acute


Plan:  Patient diagnosed with atrial fibrillation with RVR during last 

hospitalization (Feb to Mar 2018). This was related to fall resulting in LLE 

fracture, with diagnosis failure to thrive. She was discharged on cardizem PO 

but it is unclear if she has been receiving this (given G-tube malfunction 

timing is not clear and med rec not available). 


* Once med rec completed and GT functional, will transition patient back on to 

Cardizem PO dosing


* Continue Cardizem drip at this time


* Monitor vital signs closely and telemetry


* Troponin stable


* TSH slightly suppressed, repeat 3/26 with free T4 is normal





(2) Malfunction of gastrostomy tube


ICD Codes:  K94.23 - Gastrostomy malfunction


Status:  Acute


Plan:  IR initially consulted at admission. GI consulted 3/26 given they placed 

G tube in February. Still working up patient's symptoms given G-tube is patent.


-CT abd/pelvis ordered


-Fluids for the time being


-no concern for acute infection/cellulitis at this time





(3) Failure to thrive in adult


ICD Codes:  R62.7 - Adult failure to thrive


Status:  Chronic


Plan:  Patient has G-tube, malfunctioning at this time, aggressive measures 

desired by family


-Weight stable since last hospital discharge on 3/2/2018


-Nutritional consult for bolus feeding to reduce patient focus on G tube (

patient ordered for continuous feeds at time of discharge in early March)


Appreciated Nutritionist recs:


   CARLOS Barlow 1.5 rec for bolus feedings:


 1. 240mls at 8am, 11am, 2pm, 5pm, 8pm


 2. 30mls before and after each bolus feeding


 3. 200mls free water flushes q 8 hrs to meet pt's fluid needs.


 4. RD monitoring clinical course.





(4) Encounter for wound care


ICD Codes:  Z51.89 - Encounter for other specified aftercare


Status:  Acute


Plan:  She was noted to have left heel wound, ulcers 1st degree on buttocks


-wound care consulted, appreciate recommendations





(5) Dementia


ICD Codes:  F03.90 - Unspecified dementia without behavioral disturbance


Status:  Chronic


Plan:  Chronic severe dementia noted. Redirect, soft restraints ordered given 

patient pulled out IV and try to pull out G-tube in ED


-Health care surrogate per nursing home documentation is Prashanht Sidhu, phone 

number [phone], relationship is son-in-law.


-oral care ordered





(6) FEN/GI/PPx


Status:  Acute


Plan:  


Fluids:D51/2 with 20meq KCl/L @ 100ml/hr


* consulted dietician to get tube feeds re-adjusted for bolus feedings rather 

than continuous


Electrolytes: monitor and replete as needed


Nutrition: NPO


DVT Prophylaxis: Heparin 5000sq q12h, started 3/27, held previously for 

potential procedure


GI Prophylaxis: none


PRN anti-HTN: Vasotec 1.25mg qhr IV PRN for SBP > 180/ and/or DBP > 100





Case Mgmt consulted given coming from [facility]














Saranya Tobias MD Mar 27, 2018 10:11

## 2018-03-27 NOTE — HHI.PR
Addendum to Inpatient Note


Addendum Reason:  Additional Documentation


Additional Information


CT of the abdomen reviewed with the findings that the end of the gastric tube 

is in the anterior abdominal wall (not in the lumen of the stomach) as well as 

noted common bile duct dilation with a small calcified stone in the distal 

common bile duct. I call Dr. Soto and discussed this and it was determined 

that gastric tube repositioning as well as an elective ERCP was indicated. I 

called the patient's health care surrogate Mr. Prashanth Sidhu and explained the 

findings. I explained in regard to the stone in the common bile duct that at 

this time it is not currently causing any issues (no fevers, no leukocytosis, 

no elevated LFT's, etc), but that it has the potential to cause significant 

harm if not untreated at some point. Mr. Sidhu expressed understanding and 

stated the desire to proceed with the ERCP as well as the gastric tube 

repositioning.











Talha Ferrara MD R1 Mar 27, 2018 15:37

## 2018-03-27 NOTE — HHI.GIFU
Subjective


Remarks


Pt lying in bed, confused


Seems to be uncomfortable


PEG clamped 


 (Viry Craft)





Objective


Vitals I&O





Vital Signs








  Date Time  Temp Pulse Resp B/P (MAP) Pulse Ox O2 Delivery O2 Flow Rate FiO2


 


3/27/18 15:00 98.5 89 16 138/78 (98)    


 


3/27/18 13:45  100  158/90    


 


3/27/18 12:30 98.6 105 18 158/90 (112) 99   


 


3/27/18 11:08 98.2 92 18 124/73 (90) 99   


 


3/27/18 09:00  84      


 


3/27/18 08:00  80      


 


3/27/18 07:58 98.5 80 18 145/62 (89) 98   


 


3/27/18 07:00  76      


 


3/27/18 06:00  70      


 


3/27/18 05:00  77      


 


3/27/18 04:00  71      


 


3/27/18 03:30 99.0 74 16 111/66 (81) 98   


 


3/27/18 03:00  67      


 


3/27/18 02:00  76      


 


3/27/18 01:00  79      


 


3/27/18 00:00  77      


 


3/26/18 23:30 97.6 77 16 125/73 (90) 99   


 


3/26/18 23:00  77      


 


3/26/18 22:01  93  139/74    


 


3/26/18 22:00  96      


 


3/26/18 21:00  76      


 


3/26/18 20:00 97.5 91 16 139/74 (95) 100   


 


3/26/18 20:00  74      


 


3/26/18 19:00  78      


 


3/26/18 18:01  84      


 


3/26/18 17:00  88      














I/O      


 


 3/26/18 3/26/18 3/26/18 3/27/18 3/27/18 3/27/18





 07:00 15:00 23:00 07:00 15:00 23:00


 


Intake Total 1000 ml   0 ml 1000 ml 


 


Balance 1000 ml   0 ml 1000 ml 


 


      


 


Intake Oral 0 ml   0 ml  


 


IV Total 1000 ml    1000 ml 


 


# Voids 2  4 7  


 


# Bowel Movements 1  1 0  








Laboratory





Laboratory Tests








Test


  3/27/18


06:14


 


White Blood Count 8.3 


 


Red Blood Count 4.16 


 


Hemoglobin 10.9 


 


Hematocrit 33.2 


 


Mean Corpuscular Volume 79.8 


 


Mean Corpuscular Hemoglobin 26.2 


 


Mean Corpuscular Hemoglobin


Concent 32.8 


 


 


Red Cell Distribution Width 17.1 


 


Platelet Count 376 


 


Mean Platelet Volume 10.1 


 


Neutrophils (%) (Auto) 67.8 


 


Lymphocytes (%) (Auto) 18.1 


 


Monocytes (%) (Auto) 8.1 


 


Eosinophils (%) (Auto) 4.9 


 


Basophils (%) (Auto) 1.1 


 


Neutrophils # (Auto) 5.6 


 


Lymphocytes # (Auto) 1.5 


 


Monocytes # (Auto) 0.7 


 


Eosinophils # (Auto) 0.4 


 


Basophils # (Auto) 0.1 


 


CBC Comment DIFF FINAL 


 


Differential Comment  


 


Blood Urea Nitrogen 6 


 


Creatinine 0.43 


 


Random Glucose 101 


 


Calcium Level 8.5 


 


Sodium Level 139 


 


Potassium Level 3.7 


 


Chloride Level 105 


 


Carbon Dioxide Level 24.4 


 


Anion Gap 10 


 


Estimat Glomerular Filtration


Rate 141 


 














 Date/Time


Source Procedure


Growth Status


 


 


 3/25/18 09:18


Urine Catheterized Urine Urine Culture - Final


NO GROWTH IN 48 HOURS. Complete








Imaging





Last Impressions








Abdomen X-Ray 3/26/18 0000 Signed





Impressions: 





 Service Date/Time:  Monday, March 26, 2018 14:55 - CONCLUSION:  Gastrostomy 

tube 





 injection opacifies the stomach.     Misael Whiting MD 


 


Chest X-Ray 3/24/18 1648 Signed





Impressions: 





 Service Date/Time:  Saturday, March 24, 2018 16:58 - CONCLUSION: No acute 





 disease.       Don Retana MD  FACR








Physical Exam


HEENT: Normocephalic; atraumatic


CHEST:  Even/unlabored 


CARDIAC:  RR


ABDOMEN:  Soft, nondistended, appears to be tender to palpation although 

nonverbal, bowel sounds active. PEG clamped 


EXTREMITIES: No clubbing, cyanosis, or edema.


CNS:  Awake, confused 


 (Viry Craft ARNP)





Assessment and Plan


Plan


Assessment:


- Possible G-tube malfunction- sent by Chacorta Muhammad for evaluation, per chart 

her


  son is unhappy with how the site looks. Clear drainage with surrounding 

erythema


  at the site. Unsure if anyone has tried flushing PEG tube


  Will order KUB with Gastrografin through PEG, keep NPO after MN pending 

results


  of KUB may need EGD with PEG replacement tomorrow


- A fib RVR on admission- now rate controlled on Cardizem gtt


- Advanced dementia 





(3/27) Initially signed off because the KUB with Gastrografin showed good


  positioning of PEG. However, due to pts response to nurses manipulating


  PEG and seemed to be in pain when it was flushed a CT was ordered.


  CT abdomen and pelvis --> Gastric tube anterior abdominal wall and does


  not appear to connect with the stomach. Close to the transverse colon.


  CBD is dilated to 14 mm and there is a small calcified stone in the distal


  CBD. Alk phos and AST elevated.  


  Will plan for EGD with PEG tube replacement and ERCP tomorrow. Attempted


  to contact POA at listed number, went to voicemail after one ring.


  Discussed with RN she is aware of procedure tomorrow. 





Plan:


EGD with PEG tube replacement and ERCP tomorrow


Obtain consent


Currently NPO


Monitor LFTs


Further recommendations based on findings of above 





Pt has been seen and examined by myself and Dr. Soto and this note is 

satish on his behalf 


 (Viry Craft)


Physician Comments


Patient seen and examined


Agree with above


Continue with current supportive care


Monitor labs


PEG tube removed today


Plan for an EGD and an ERCP tomorrow for choledocholithiasis


 (Buddy Soto MD)











Viry Craft Mar 27, 2018 16:20


Buddy Soto MD Mar 27, 2018 20:23

## 2018-03-27 NOTE — RADRPT
EXAM DATE/TIME:  03/27/2018 12:09 

 

[facility] COMPARISON:     

No previous studies available for comparison.

 

 

INDICATIONS :     

Abdomen pain, pain around gastric tube.

                  

 

IV CONTRAST:     

71 cc Omnipaque 350 (iohexol) IV 

 

 

ORAL CONTRAST:      

No oral contrast ingested.

                  

 

RADIATION DOSE:     

9.47 CTDIvol (mGy) 

 

 

MEDICAL HISTORY :     

Carcinoma, bladder.  

 

SURGICAL HISTORY :       

Gastric tube

 

ENCOUNTER:      

Initial

 

ACUITY:      

1 day

 

PAIN SCALE:      

2/10

 

LOCATION:       

Bilateral anterior 

 

TECHNIQUE:              

Volumetric scanning of the abdomen was performed.  Using automated exposure control and adjustment of
 the mA and/or kV according to patient size, radiation dose was kept as low as reasonably achievable 
to obtain optimal diagnostic quality images.  DICOM format image data is available electronically for
 review and comparison.  

 

FINDINGS:     

Patient reportedly has a gastric tube. The end of the tube is in the anterior abdominal wall and is n
ot seen to be within the lumen of the stomach.

 

There are small bilateral pleural effusions with compressive atelectasis at the lung bases.

 

No acute findings in the liver, spleen, adrenals or pancreas. Common bile duct dilated to about 14 mm
 with a small calcified gallstone in the distal common bile duct. No calcified stones seen in the gal
lbladder.

 

There are 2 cysts in the upper pole left kidney. Right kidney unremarkable except for small cyst.

 

On the final image there is a 5 cm fluid attenuation mass in left lower quadrant incompletely evaluat
ed. There is also a 2.3 cm fluid attenuation lesion in the right lower quadrant. Pelvic ultrasound ma
y be helpful.

 

CONCLUSION:     

1. Gastric tube is anterior abdominal wall and does not appear to connect with the stomach on the cur
rent exam. It is close to the transverse colon.

2. Common bile duct is dilated to 14 mm and there is a small calcified stone in the distal common amberly
t Small bilateral pleural effusions with compressive atelectasis at the lung bases.

3. Cystic appearing lesions on the final image through the upper pelvis. These would be better evalua
hernandez with pelvic ultrasound.

 

 

 

 Keo Damian MD on March 27, 2018 at 14:41           

Board Certified Radiologist.

 This report was verified electronically.

## 2018-03-28 VITALS
SYSTOLIC BLOOD PRESSURE: 112 MMHG | OXYGEN SATURATION: 97 % | RESPIRATION RATE: 20 BRPM | DIASTOLIC BLOOD PRESSURE: 59 MMHG | TEMPERATURE: 98.7 F | HEART RATE: 82 BPM

## 2018-03-28 VITALS — HEART RATE: 80 BPM

## 2018-03-28 VITALS — HEART RATE: 104 BPM

## 2018-03-28 VITALS
TEMPERATURE: 97 F | OXYGEN SATURATION: 97 % | HEART RATE: 85 BPM | DIASTOLIC BLOOD PRESSURE: 88 MMHG | SYSTOLIC BLOOD PRESSURE: 131 MMHG | RESPIRATION RATE: 16 BRPM

## 2018-03-28 VITALS
OXYGEN SATURATION: 97 % | SYSTOLIC BLOOD PRESSURE: 103 MMHG | TEMPERATURE: 98 F | RESPIRATION RATE: 18 BRPM | DIASTOLIC BLOOD PRESSURE: 71 MMHG | HEART RATE: 85 BPM

## 2018-03-28 VITALS
DIASTOLIC BLOOD PRESSURE: 58 MMHG | RESPIRATION RATE: 16 BRPM | TEMPERATURE: 97.9 F | SYSTOLIC BLOOD PRESSURE: 104 MMHG | HEART RATE: 87 BPM | OXYGEN SATURATION: 95 %

## 2018-03-28 VITALS
DIASTOLIC BLOOD PRESSURE: 93 MMHG | RESPIRATION RATE: 16 BRPM | TEMPERATURE: 98.4 F | SYSTOLIC BLOOD PRESSURE: 143 MMHG | OXYGEN SATURATION: 98 % | HEART RATE: 100 BPM

## 2018-03-28 VITALS
RESPIRATION RATE: 16 BRPM | DIASTOLIC BLOOD PRESSURE: 70 MMHG | TEMPERATURE: 98.3 F | HEART RATE: 85 BPM | OXYGEN SATURATION: 96 % | SYSTOLIC BLOOD PRESSURE: 132 MMHG

## 2018-03-28 VITALS
HEART RATE: 91 BPM | DIASTOLIC BLOOD PRESSURE: 84 MMHG | SYSTOLIC BLOOD PRESSURE: 139 MMHG | RESPIRATION RATE: 16 BRPM | TEMPERATURE: 97.6 F | OXYGEN SATURATION: 96 %

## 2018-03-28 VITALS — HEART RATE: 84 BPM

## 2018-03-28 VITALS — HEART RATE: 88 BPM

## 2018-03-28 VITALS — HEART RATE: 93 BPM

## 2018-03-28 VITALS — HEART RATE: 92 BPM

## 2018-03-28 VITALS — HEART RATE: 85 BPM

## 2018-03-28 VITALS — HEART RATE: 90 BPM

## 2018-03-28 VITALS — HEART RATE: 96 BPM

## 2018-03-28 VITALS — HEART RATE: 99 BPM

## 2018-03-28 LAB
ALBUMIN SERPL-MCNC: 2.7 GM/DL (ref 3.4–5)
ALP SERPL-CCNC: 178 U/L (ref 45–117)
ALT SERPL-CCNC: 43 U/L (ref 10–53)
AST SERPL-CCNC: 27 U/L (ref 15–37)
BASOPHILS # BLD AUTO: 0.1 TH/MM3 (ref 0–0.2)
BASOPHILS NFR BLD: 1 % (ref 0–2)
BILIRUB SERPL-MCNC: 0.6 MG/DL (ref 0.2–1)
BUN SERPL-MCNC: 3 MG/DL (ref 7–18)
CALCIUM SERPL-MCNC: 8.9 MG/DL (ref 8.5–10.1)
CHLORIDE SERPL-SCNC: 104 MEQ/L (ref 98–107)
CREAT SERPL-MCNC: 0.45 MG/DL (ref 0.5–1)
EOSINOPHIL # BLD: 0.4 TH/MM3 (ref 0–0.4)
EOSINOPHIL NFR BLD: 3.7 % (ref 0–4)
ERYTHROCYTE [DISTWIDTH] IN BLOOD BY AUTOMATED COUNT: 17.2 % (ref 11.6–17.2)
GFR SERPLBLD BASED ON 1.73 SQ M-ARVRAT: 133 ML/MIN (ref 89–?)
GLUCOSE SERPL-MCNC: 108 MG/DL (ref 74–106)
HCO3 BLD-SCNC: 26.8 MEQ/L (ref 21–32)
HCT VFR BLD CALC: 34.6 % (ref 35–46)
HGB BLD-MCNC: 11.4 GM/DL (ref 11.6–15.3)
INR PPP: 1.1 RATIO
LYMPHOCYTES # BLD AUTO: 1.7 TH/MM3 (ref 1–4.8)
LYMPHOCYTES NFR BLD AUTO: 16.5 % (ref 9–44)
MCH RBC QN AUTO: 26.5 PG (ref 27–34)
MCHC RBC AUTO-ENTMCNC: 32.9 % (ref 32–36)
MCV RBC AUTO: 80.7 FL (ref 80–100)
MONOCYTE #: 0.8 TH/MM3 (ref 0–0.9)
MONOCYTES NFR BLD: 7.7 % (ref 0–8)
NEUTROPHILS # BLD AUTO: 7.2 TH/MM3 (ref 1.8–7.7)
NEUTROPHILS NFR BLD AUTO: 71.1 % (ref 16–70)
PLATELET # BLD: 420 TH/MM3 (ref 150–450)
PMV BLD AUTO: 9.4 FL (ref 7–11)
PROT SERPL-MCNC: 7.7 GM/DL (ref 6.4–8.2)
PROTHROMBIN TIME: 11.4 SEC (ref 9.8–11.6)
RBC # BLD AUTO: 4.29 MIL/MM3 (ref 4–5.3)
SODIUM SERPL-SCNC: 139 MEQ/L (ref 136–145)
WBC # BLD AUTO: 10.2 TH/MM3 (ref 4–11)

## 2018-03-28 PROCEDURE — BF101ZZ FLUOROSCOPY OF BILE DUCTS USING LOW OSMOLAR CONTRAST: ICD-10-PCS | Performed by: INTERNAL MEDICINE

## 2018-03-28 PROCEDURE — 0DB68ZX EXCISION OF STOMACH, VIA NATURAL OR ARTIFICIAL OPENING ENDOSCOPIC, DIAGNOSTIC: ICD-10-PCS | Performed by: INTERNAL MEDICINE

## 2018-03-28 PROCEDURE — 0FC98ZZ EXTIRPATION OF MATTER FROM COMMON BILE DUCT, VIA NATURAL OR ARTIFICIAL OPENING ENDOSCOPIC: ICD-10-PCS | Performed by: INTERNAL MEDICINE

## 2018-03-28 PROCEDURE — 0F798ZZ DILATION OF COMMON BILE DUCT, VIA NATURAL OR ARTIFICIAL OPENING ENDOSCOPIC: ICD-10-PCS | Performed by: INTERNAL MEDICINE

## 2018-03-28 PROCEDURE — 0DH63UZ INSERTION OF FEEDING DEVICE INTO STOMACH, PERCUTANEOUS APPROACH: ICD-10-PCS | Performed by: INTERNAL MEDICINE

## 2018-03-28 PROCEDURE — 0DB78ZX EXCISION OF STOMACH, PYLORUS, VIA NATURAL OR ARTIFICIAL OPENING ENDOSCOPIC, DIAGNOSTIC: ICD-10-PCS | Performed by: INTERNAL MEDICINE

## 2018-03-28 RX ADMIN — Medication SCH ML: at 15:30

## 2018-03-28 RX ADMIN — Medication SCH ML: at 21:00

## 2018-03-28 RX ADMIN — POTASSIUM CHLORIDE, DEXTROSE MONOHYDRATE AND SODIUM CHLORIDE SCH MLS/HR: 150; 5; 450 INJECTION, SOLUTION INTRAVENOUS at 15:30

## 2018-03-28 RX ADMIN — STANDARDIZED SENNA CONCENTRATE AND DOCUSATE SODIUM SCH TAB: 8.6; 5 TABLET, FILM COATED ORAL at 21:00

## 2018-03-28 RX ADMIN — SODIUM CHLORIDE PRN MLS/HR: 900 INJECTION, SOLUTION INTRAVENOUS at 15:10

## 2018-03-28 RX ADMIN — POTASSIUM CHLORIDE, DEXTROSE MONOHYDRATE AND SODIUM CHLORIDE SCH MLS/HR: 150; 5; 450 INJECTION, SOLUTION INTRAVENOUS at 23:19

## 2018-03-28 NOTE — RADRPT
EXAM DATE/TIME:  03/28/2018 08:53 

 

[facility] COMPARISON:     

No previous studies available for comparison.

                     

 

INDICATIONS :     

Choledocolithiasis. Sphincterotomy and stone extraction.

                     

 

FLUORO TIME:      

3.8 minutes

 

IMAGE COUNT:       

1

                     

 

CONTRAST:     

Instilled by Ordering Physician

                     

 

MEDICAL HISTORY :     

Carcinoma, bladder.          

 

SURGICAL HISTORY :        

Gastric tube.

 

ENCOUNTER:     

Subsequent                                        

 

ACUITY:     

3 days      

 

PAIN SCORE:     

Non-responsive.

 

LOCATION:     

Right upper quadrant  

 

FINDINGS:     

An ERCP was performed by the ordering physician.  

 

The single image demonstrates contrast in a dilated common bile duct.

 

CONCLUSION:     ERCP as above.

 

 

 

 Petr Salgado MD on March 28, 2018 at 10:23           

Board Certified Radiologist.

 This report was verified electronically.

## 2018-03-28 NOTE — EKG
Date Performed: 03/28/2018       Time Performed: 08:58:22

 

PTAGE:      82 years

 

EKG:      Atrial fibrillation Left axis deviation Possible anteroseptal infarct - age undetermined In
ferior T wave changes are nonspecific Abnormal ECG

 

PREVIOUS TRACING       : 03/24/2018 15.21 Since the previous tracing, no significant change noted

 

DOCTOR:   Angela Lozada  Interpretating Date/Time  03/28/2018 16:47:26

## 2018-03-28 NOTE — PD.PROCEDR
GI Procedure





PROCEDURE PERFORMED


ERCP with sphincterotomy and balloon extraction


EGD with biopsy and PEG placement





INDICATION FOR PROCEDURE


Choledocholithiasis, dysphagia, dementia





PROCEDURE:


The procedure, risks and benefits were discussed with Ms. Alvarenga and informed


consent was obtained.  Anesthesia sedated her with Diprivan.  She was placed in 

the left lateral decubitus position.





EGD:


The Pentax videoscope was introduced through the oropharynx and advanced to the 

second portion of the duodenum under direct visualization. Retroflexion was 

performed in the stomach.





FINDINGS:


The esophagus this appeared to be unremarkable and within normal limits


The stomach there was a moderately sized hiatal hernia within the hernia sac 

there was quite a bit of erythema suggestive of AVM this was partially biopsied 

for further evaluation otherwise gastric mucosa unremarkable except for a 

nodule in the antrum which was biopsied


The duodenum this was normal


Following the evaluation of the stomach and the duodenum the stomach was 

insufflated with air and the area of PEG placement was identified through 

indentation and transillumination the area was prepped and draped in usual 

fashion 5 cc of lidocaine were injected locally a small incision was made then 

an Angiocath was passed into the stomach through which a guidewire was passed 

this was retrieved with the scope into that a PEG tube was attached and pulled 

into place and thereafter secured in usual fashion


The patient tolerated procedure well and there are no immediate complications





ERCP:


Patient was placed in a prone position. The Pentax videoscope was introduced 

through the oropharynx and advanced to the second portion of the duodenum where 

the ampula was identified. 





FINDINGS:


The ampulla this was unremarkable with normal limits


The common bile duct we obtained easy cannulation of the common bile duct which 

appeared to be huge measuring more than 1.8-1.9 cm with noted filling defects a 

generous sphincterotomy was performed then using the 15 mm balloon we were able 

to extract 2 stones and obstructive cholangiogram thereafter revealed no 

further lymph filling defects


The intrahepatics were not adequately evaluated





ESTIMATED BLOOD LOSS:


None





SPECIMENS REMOVED:


Specimens from the cardia and the antrum





COMPLICATIONS:


None





IMPRESSION:


Hiatal hernia


Erythema in the cardia possible AVM


Antral nodule


Choledocholithiasis





PLAN:


Await biopsies


Recommend PPI


Monitor labs


Post PEG orders


1. May use PEG tube for medications today


2. May start feeding tomorrow


3. May obtain nutritional consult for tube feeding


4. Flush tube with 50 cc of water every 4-6 hours


5. Always flush tube after feedings


6. Apply abdominal binder as necessary


7. Clamp G-tube after use and flush.











Buddy Soto MD Mar 28, 2018 10:56

## 2018-03-28 NOTE — HHI.FPPN
Subjective


Remarks


No acute events overnight. Patient is somewhat more lucid today as she was able 

to state her name. She denied any pain. VS were stable overnight.





Objective


Vitals





Vital Signs








  Date Time  Temp Pulse Resp B/P (MAP) Pulse Ox O2 Delivery O2 Flow Rate FiO2


 


3/28/18 06:00  80      


 


3/28/18 05:00  85      


 


3/28/18 04:00  93      


 


3/28/18 03:00  85      


 


3/28/18 03:00 97.0 85 16 131/88 (102) 97   


 


3/28/18 02:00  88      


 


3/28/18 01:00  84      


 


3/28/18 00:00  74      


 


3/28/18 00:00 97.6 91 16 139/84 (102) 96   


 


3/27/18 23:00  94      


 


3/27/18 22:00  82      


 


3/27/18 21:00  74      


 


3/27/18 20:00  74      


 


3/27/18 20:00 98.3 80 16 133/77 (95) 97   


 


3/27/18 19:55  77  133/77    


 


3/27/18 19:00  84      


 


3/27/18 18:00  83      


 


3/27/18 17:00  102      


 


3/27/18 16:00  98      


 


3/27/18 15:00 98.5 89 16 138/78 (98)    


 


3/27/18 15:00  99      


 


3/27/18 14:00  98      


 


3/27/18 13:45  100  158/90    


 


3/27/18 13:00  100      


 


3/27/18 12:30 98.6 105 18 158/90 (112) 99   


 


3/27/18 11:08 98.2 92 18 124/73 (90) 99   














I/O      


 


 3/27/18 3/27/18 3/27/18 3/28/18 3/28/18 3/28/18





 07:00 15:00 23:00 07:00 15:00 23:00


 


Intake Total 0 ml 1000 ml 630 ml 0 ml  


 


Output Total   300 ml 900 ml  


 


Balance 0 ml 1000 ml 330 ml -900 ml  


 


      


 


Intake Oral 0 ml  0 ml 0 ml  


 


IV Total  1000 ml 630 ml   


 


Output Urine Total   300 ml 900 ml  


 


# Voids 7  3   


 


# Bowel Movements 0  0 3  








Result Diagram:  


3/28/18 0640                                                                   

             3/28/18 0640





Objective Remarks


GEN: Thin, malnourished patient resting comfortably in bed.  Patient able to 

state her name today. Soft restraints currently in place.


SKIN: Skin around G tube site erythematous but without pus or overt signs of 

infection. G tube currently removed


ENT: Tooth decay noted. Oral care improved.


CV: Regular rate and rhythm without obvious murmurs


LUNGS: Anterior lung fields clear to auscultation bilaterally. Normal 

respiratory effort. No wheezes, rales, rhonchi.


GASTROINTESTINAL:  Abdomen soft, non-tender, nondistended. No hepato-

splenomegaly, or palpable masses. 


EXT: Bilateral feet in floaters. No edema. No calf tenderness.


NEURO/PSYCH: Awake, alert but confused.





A/P


Assessment and Plan


Patient is an 82 year old male with history of atrial fibrillation, dementia, 

failure to thrive, and recent orthopedic procedure of LLE who presents from 

[facility] for workup of gastrostomy tube malfunction. On ED evaluation she 

was noted to have atrial fibrillation with RVR to rate 150s that has been rate 

controlled with Cardizem drip.  


Admitted to [facility] for further A. fib management and correction of PEG tube.


3/26: IR to evaluate G tube today. Heart Rate controlled with Cardizem gtt. 

Plan is to transition to PO Cardizem once G-tube functional. Per nurse, IR 

states Gastroenterology put in G-tube and thus Gastroenterology must be 

consulted. Will consult and call GI.


3/27: Gastrografin study showed patency of G tube into stomach. Patient 

continues to have pain with manipulation of the G-tube. Ct abd/pelvis ordered 

after discussion with GI showed end of G tube located in anterior abdominal 

wall (not in lumen of stomach), also showed common bile duct dilation with 

stone. Family consented for ERCP and G tube replacement


3/28: ERCP and G tube replacement with EGD


Discharge Planning


Discharge pending tolerance of ERCP, G tube placement procedures. Will need 

established functioning G tube prior to DC





dw Dr. Jeffries, and seen with Dr. Tobias


Problem List:  


(1) Atrial fibrillation with RVR


ICD Codes:  I48.91 - Unspecified atrial fibrillation


Status:  Acute


Plan:  Patient diagnosed with atrial fibrillation with RVR during last 

hospitalization (Feb to Mar 2018). This was related to fall resulting in LLE 

fracture, with diagnosis failure to thrive. She was discharged on cardizem PO 

but it is unclear if she has been receiving this (given G-tube malfunction 

timing is not clear and med rec not available). 


* Once med rec completed and GT functional, will transition patient back on to 

Cardizem PO dosing


* Continue Cardizem drip at this time


* Monitor vital signs closely and telemetry


* Troponin stable


* TSH slightly suppressed, repeat 3/26 with free T4 is normal





(2) Malfunction of gastrostomy tube


ICD Codes:  K94.23 - Gastrostomy malfunction


Status:  Acute


Plan:  IR initially consulted at admission. GI consulted 3/26 given they placed 

G tube in February. Still working up patient's symptoms given G-tube is patent.


-CT abd/pelvis ordered on 3/27 showed end of G tube located in anterior 

abdominal wall (not in lumen of stomach) 


-Fluids for the time being


-no concern for acute infection/cellulitis at this time





- 3/28: EGD with G tube placement planned





(3) Common bile duct dilatation


ICD Codes:  K83.8 - Other specified diseases of biliary tract


Plan:  CT abdomen on 3/27 showed common bile duct dilation to 14mm and small 

calcified stone in distal common duct


-GI planning for ERCP on 3/28


-No elevated LFTs, Leukocytosis or systemic signs of infection prior to 

procedure





(4) Failure to thrive in adult


ICD Codes:  R62.7 - Adult failure to thrive


Status:  Chronic


Plan:  Patient has G-tube, malfunctioning at this time, aggressive measures 

desired by family


-Weight stable since last hospital discharge on 3/2/2018


-Nutritional consult for bolus feeding to reduce patient focus on G tube (

patient ordered for continuous feeds at time of discharge in early March)


Appreciated Nutritionist recs:


   CARLOS Barlow 1.5 rec for bolus feedings:


 1. 240mls at 8am, 11am, 2pm, 5pm, 8pm


 2. 30mls before and after each bolus feeding


 3. 200mls free water flushes q 8 hrs to meet pt's fluid needs.


 4. RD monitoring clinical course.





(5) Encounter for wound care


ICD Codes:  Z51.89 - Encounter for other specified aftercare


Status:  Acute


Plan:  She was noted to have left heel wound, ulcers 1st degree on buttocks


-wound care consulted, appreciate recommendations





(6) Dementia


ICD Codes:  F03.90 - Unspecified dementia without behavioral disturbance


Status:  Chronic


Plan:  Chronic severe dementia noted. Redirect, soft restraints ordered given 

patient pulled out IV and try to pull out G-tube in ED


-Health care surrogate per nursing home documentation is Prashanth Sidhu, phone 

number [phone], relationship is son-in-law.


-oral care ordered





(7) FEN/GI/PPx


Status:  Acute


Plan:  


Fluids:D51/2 with 20meq KCl/L @ 100ml/hr


* consulted dietician to get tube feeds re-adjusted for bolus feedings rather 

than continuous


Electrolytes: monitor and replete as needed


Nutrition: NPO


DVT Prophylaxis: holding for GI procedure on 3/28


GI Prophylaxis: none


PRN anti-HTN: Vasotec 1.25mg qhr IV PRN for SBP > 180/ and/or DBP > 100





Case Mgmt consulted given coming from [facility]














Talha Ferrara MD R1 Mar 28, 2018 09:14

## 2018-03-29 VITALS
TEMPERATURE: 98.1 F | SYSTOLIC BLOOD PRESSURE: 122 MMHG | RESPIRATION RATE: 20 BRPM | DIASTOLIC BLOOD PRESSURE: 77 MMHG | HEART RATE: 88 BPM | OXYGEN SATURATION: 98 %

## 2018-03-29 VITALS
TEMPERATURE: 97.8 F | DIASTOLIC BLOOD PRESSURE: 76 MMHG | HEART RATE: 96 BPM | OXYGEN SATURATION: 99 % | SYSTOLIC BLOOD PRESSURE: 115 MMHG | RESPIRATION RATE: 20 BRPM

## 2018-03-29 VITALS — HEART RATE: 98 BPM

## 2018-03-29 VITALS — HEART RATE: 88 BPM

## 2018-03-29 VITALS
TEMPERATURE: 98.1 F | DIASTOLIC BLOOD PRESSURE: 72 MMHG | OXYGEN SATURATION: 98 % | SYSTOLIC BLOOD PRESSURE: 129 MMHG | HEART RATE: 109 BPM | RESPIRATION RATE: 20 BRPM

## 2018-03-29 VITALS — HEART RATE: 80 BPM

## 2018-03-29 VITALS
TEMPERATURE: 98.2 F | DIASTOLIC BLOOD PRESSURE: 54 MMHG | OXYGEN SATURATION: 97 % | HEART RATE: 87 BPM | RESPIRATION RATE: 20 BRPM | SYSTOLIC BLOOD PRESSURE: 84 MMHG

## 2018-03-29 VITALS — HEART RATE: 70 BPM

## 2018-03-29 VITALS — HEART RATE: 72 BPM

## 2018-03-29 VITALS — HEART RATE: 86 BPM

## 2018-03-29 VITALS — HEART RATE: 124 BPM

## 2018-03-29 VITALS — HEART RATE: 90 BPM

## 2018-03-29 VITALS — HEART RATE: 100 BPM

## 2018-03-29 VITALS — HEART RATE: 96 BPM

## 2018-03-29 VITALS — HEART RATE: 83 BPM

## 2018-03-29 VITALS — HEART RATE: 91 BPM

## 2018-03-29 VITALS — HEART RATE: 73 BPM

## 2018-03-29 VITALS — HEART RATE: 94 BPM

## 2018-03-29 VITALS — HEART RATE: 60 BPM

## 2018-03-29 LAB
ALBUMIN SERPL-MCNC: 2.7 GM/DL (ref 3.4–5)
ALP SERPL-CCNC: 184 U/L (ref 45–117)
ALT SERPL-CCNC: 37 U/L (ref 10–53)
AST SERPL-CCNC: 22 U/L (ref 15–37)
BILIRUB SERPL-MCNC: 0.6 MG/DL (ref 0.2–1)
BUN SERPL-MCNC: 11 MG/DL (ref 7–18)
CALCIUM SERPL-MCNC: 9.4 MG/DL (ref 8.5–10.1)
CHLORIDE SERPL-SCNC: 106 MEQ/L (ref 98–107)
CREAT SERPL-MCNC: 0.69 MG/DL (ref 0.5–1)
ERYTHROCYTE [DISTWIDTH] IN BLOOD BY AUTOMATED COUNT: 17.2 % (ref 11.6–17.2)
GFR SERPLBLD BASED ON 1.73 SQ M-ARVRAT: 81 ML/MIN (ref 89–?)
GLUCOSE SERPL-MCNC: 137 MG/DL (ref 74–106)
HCO3 BLD-SCNC: 26.2 MEQ/L (ref 21–32)
HCT VFR BLD CALC: 34.1 % (ref 35–46)
HGB BLD-MCNC: 10.9 GM/DL (ref 11.6–15.3)
MCH RBC QN AUTO: 25.8 PG (ref 27–34)
MCHC RBC AUTO-ENTMCNC: 32.1 % (ref 32–36)
MCV RBC AUTO: 80.3 FL (ref 80–100)
PLATELET # BLD: 444 TH/MM3 (ref 150–450)
PMV BLD AUTO: 8.7 FL (ref 7–11)
PROT SERPL-MCNC: 8 GM/DL (ref 6.4–8.2)
RBC # BLD AUTO: 4.24 MIL/MM3 (ref 4–5.3)
SODIUM SERPL-SCNC: 138 MEQ/L (ref 136–145)
WBC # BLD AUTO: 14 TH/MM3 (ref 4–11)

## 2018-03-29 RX ADMIN — ASPIRIN SCH MG: 81 TABLET ORAL at 09:11

## 2018-03-29 RX ADMIN — DILTIAZEM HYDROCHLORIDE SCH MG: 30 TABLET, FILM COATED ORAL at 23:55

## 2018-03-29 RX ADMIN — STANDARDIZED SENNA CONCENTRATE AND DOCUSATE SODIUM SCH TAB: 8.6; 5 TABLET, FILM COATED ORAL at 21:00

## 2018-03-29 RX ADMIN — Medication SCH ML: at 09:06

## 2018-03-29 RX ADMIN — Medication SCH ML: at 17:06

## 2018-03-29 RX ADMIN — ASPIRIN SCH MG: 81 TABLET ORAL at 09:06

## 2018-03-29 RX ADMIN — DILTIAZEM HYDROCHLORIDE SCH MG: 30 TABLET, FILM COATED ORAL at 05:46

## 2018-03-29 RX ADMIN — DILTIAZEM HYDROCHLORIDE SCH MG: 30 TABLET, FILM COATED ORAL at 00:19

## 2018-03-29 RX ADMIN — DILTIAZEM HYDROCHLORIDE SCH MG: 30 TABLET, FILM COATED ORAL at 17:01

## 2018-03-29 RX ADMIN — DILTIAZEM HYDROCHLORIDE SCH MG: 30 TABLET, FILM COATED ORAL at 12:01

## 2018-03-29 RX ADMIN — Medication SCH ML: at 21:00

## 2018-03-29 RX ADMIN — Medication SCH ML: at 23:55

## 2018-03-29 RX ADMIN — STANDARDIZED SENNA CONCENTRATE AND DOCUSATE SODIUM SCH TAB: 8.6; 5 TABLET, FILM COATED ORAL at 09:06

## 2018-03-29 RX ADMIN — POTASSIUM CHLORIDE, DEXTROSE MONOHYDRATE AND SODIUM CHLORIDE SCH MLS/HR: 150; 5; 450 INJECTION, SOLUTION INTRAVENOUS at 09:08

## 2018-03-29 NOTE — HHI.GIFU
Subjective


Remarks


Pt resting in bed


Confused


Soft wrist restraints


PEG tube clamped, scant amount of dried blood around PEG site, covered with 

clean and dry gauze 


 (Viry Craft)





Objective


Vitals I&O





Vital Signs








  Date Time  Temp Pulse Resp B/P (MAP) Pulse Ox O2 Delivery O2 Flow Rate FiO2


 


3/29/18 06:00  86      


 


3/29/18 05:00  86      


 


3/29/18 04:00  80      


 


3/29/18 03:00  87      


 


3/29/18 03:00 98.2 87 20 84/54 (64) 97   


 


3/29/18 02:00  83      


 


3/29/18 01:00  80      


 


3/29/18 00:00  94      


 


3/28/18 23:00 97.9 87 16 104/58 (73) 95   


 


3/28/18 23:00  87      


 


3/28/18 22:00  90      


 


3/28/18 21:00  80      


 


3/28/18 20:00 98.0 99 18 103/71 (82) 97   


 


3/28/18 20:00  85      


 


3/28/18 19:00  99      


 


3/28/18 16:00  96      


 


3/28/18 15:50 98.4 100 16 143/93 (110) 98   


 


3/28/18 15:50  100      


 


3/28/18 15:10  113  143/93    


 


3/28/18 15:00  104      


 


3/28/18 14:00  92      


 


3/28/18 13:00  80      


 


3/28/18 12:40  85      


 


3/28/18 12:40 98.3 85 16 132/70 (90) 96   


 


3/28/18 12:00  68 16 119/58 (78) 97 Nasal Cannula 2 


 


3/28/18 11:30  68 16 115/62 (79) 98 Nasal Cannula 2 














I/O      


 


 3/28/18 3/28/18 3/28/18 3/29/18 3/29/18 3/29/18





 07:00 15:00 23:00 07:00 15:00 23:00


 


Intake Total 0 ml 250 ml 1123 ml   


 


Output Total 900 ml     


 


Balance -900 ml 250 ml 1123 ml   


 


      


 


Intake Oral 0 ml  0 ml   


 


IV Total  50 ml 1123 ml   


 


Other  200 ml    


 


Output Urine Total 900 ml     


 


# Voids   3 2  


 


# Bowel Movements 3 1 1 1  








Laboratory





Laboratory Tests








Test


  3/29/18


06:18


 


White Blood Count 14.0 


 


Red Blood Count 4.24 


 


Hemoglobin 10.9 


 


Hematocrit 34.1 


 


Mean Corpuscular Volume 80.3 


 


Mean Corpuscular Hemoglobin 25.8 


 


Mean Corpuscular Hemoglobin


Concent 32.1 


 


 


Red Cell Distribution Width 17.2 


 


Platelet Count 444 


 


Mean Platelet Volume 8.7 


 


Blood Urea Nitrogen 11 


 


Creatinine 0.69 


 


Random Glucose 137 


 


Total Protein 8.0 


 


Albumin 2.7 


 


Calcium Level 9.4 


 


Alkaline Phosphatase 184 


 


Aspartate Amino Transf


(AST/SGOT) 22 


 


 


Alanine Aminotransferase


(ALT/SGPT) 37 


 


 


Total Bilirubin 0.6 


 


Sodium Level 138 


 


Potassium Level 5.0 


 


Chloride Level 106 


 


Carbon Dioxide Level 26.2 


 


Anion Gap 6 


 


Estimat Glomerular Filtration


Rate 81 


 


 


Lipase 110 














 Date/Time


Source Procedure


Growth Status


 


 


 3/25/18 09:18


Urine Catheterized Urine Urine Culture - Final


NO GROWTH IN 48 HOURS. Complete








Imaging





Last Impressions








GI Procedure 3/28/18 0000 Signed





Impressions: 





 Service Date/Time:  Wednesday, March 28, 2018 08:53 - CONCLUSION: ERCP as 

above. 





     Petr Salgado MD 


 


Abdomen CT 3/27/18 0000 Signed





Impressions: 





 Service Date/Time:  Tuesday, March 27, 2018 12:09 - CONCLUSION:  1. Gastric 

tube 





 is anterior abdominal wall and does not appear to connect with the stomach on 





 the current exam. It is close to the transverse colon. 2. Common bile duct is 





 dilated to 14 mm and there is a small calcified stone in the distal common 

duct 





 Small bilateral pleural effusions with compressive atelectasis at the lung 





 bases. 3. Cystic appearing lesions on the final image through the upper 

pelvis. 





 These would be better evaluated with pelvic ultrasound.     Keo Damian MD 


 


Abdomen X-Ray 3/26/18 0000 Signed





Impressions: 





 Service Date/Time:  Monday, March 26, 2018 14:55 - CONCLUSION:  Gastrostomy 

tube 





 injection opacifies the stomach.     Misael Whiting MD 


 


Chest X-Ray 3/24/18 1648 Signed





Impressions: 





 Service Date/Time:  Saturday, March 24, 2018 16:58 - CONCLUSION: No acute 





 disease.       Don Retana MD  FACR








Physical Exam


HEENT: Normocephalic; atraumatic


CHEST:  Even/unlabored 


CARDIAC:  RR


ABDOMEN:  Soft, nondistended, bowel sounds active. PEG clamped 


EXTREMITIES: No clubbing, cyanosis, or edema.


CNS:  Awake, confused 


 (Viry Craft)





Assessment and Plan


Plan


Assessment:


- Possible G-tube malfunction- sent by Chacorta Muhammad for evaluation, per chart 

her


  son is unhappy with how the site looks. Clear drainage with surrounding 

erythema


  at the site. Unsure if anyone has tried flushing PEG tube


  Will order KUB with Gastrografin through PEG, keep NPO after MN pending 

results


  of KUB may need EGD with PEG replacement tomorrow


- A fib RVR on admission- now rate controlled on Cardizem gtt


- Advanced dementia 





(3/27) Initially signed off because the KUB with Gastrografin showed good


  positioning of PEG. However, due to pts response to nurses manipulating


  PEG and seemed to be in pain when it was flushed a CT was ordered.


  CT abdomen and pelvis --> Gastric tube anterior abdominal wall and does


  not appear to connect with the stomach. Close to the transverse colon.


  CBD is dilated to 14 mm and there is a small calcified stone in the distal


  CBD. Alk phos and AST elevated.  


  Will plan for EGD with PEG tube replacement and ERCP tomorrow. Attempted


  to contact POA at listed number, went to voicemail after one ring.


  Discussed with RN she is aware of procedure tomorrow. 





(3/28) --> S/P EGD with PEG replacement and ERCP --> Hiatal hernia. Erythema


  in the cardia possible AVM. Antral nodule. Choledocholithiasis.  


  PEG clamped, site with scant amount of dried blood, gauze covering site is 

clean


  an dry.


  Nutrition recommendations: Jevity 1.5, bolus feeding 240 mL at 8am, 11am, 2pm


  5pm, and 8 pm. 30mLs before and after each bolus feeding. 200 mL free water


  flushes q8hrs. 


  AST, ALT, T bili WNL, alk phos about the same as yesterday 





Plan:


Protonix


Flush PEG with 50 cc of water q4-6 hrs


Flush PEG after feedings


Apply abdominal binder as necessary 


TF as per nutrition


GI will sign off, please reconsult as needed





Pt has been seen and examined by myself and Dr. Soto and this note is 

written on his behalf 


 (Viry Craft)


Physician Comments


Seen and examined


Agree with above


Continue with current supportive care


Monitor labs


Gastric biopsy reveals H pylori would recommend treatment with triple therapy 

amoxicillin 1 g twice daily for 2 weeks, clarithromycin 500 mg twice daily for 

2 weeks and a proton pump inhibitor twice daily for 2 weeks


Not much to add at this point we will sign off


 (Buddy Soto MD)











Viry Craft Mar 29, 2018 11:28


Buddy Soto MD Mar 29, 2018 20:51

## 2018-03-29 NOTE — HHI.FPPN
Subjective


Remarks


No acute events overnight.  Vital signs unremarkable in pulse is appropriate 

since transition to PEG medication.  Patient remains confused and unable to 

communicate.  Soft restraints in place but loose.





Objective


Vitals





Vital Signs








  Date Time  Temp Pulse Resp B/P (MAP) Pulse Ox O2 Delivery O2 Flow Rate FiO2


 


3/29/18 12:02 98.1 88 20 122/77 (92) 98   


 


3/29/18 06:00  86      


 


3/29/18 05:00  86      


 


3/29/18 04:00  80      


 


3/29/18 03:00  87      


 


3/29/18 03:00 98.2 87 20 84/54 (64) 97   


 


3/29/18 02:00  83      


 


3/29/18 01:00  80      


 


3/29/18 00:00  94      


 


3/28/18 23:00 97.9 87 16 104/58 (73) 95   


 


3/28/18 23:00  87      


 


3/28/18 22:00  90      


 


3/28/18 21:00  80      


 


3/28/18 20:00 98.0 99 18 103/71 (82) 97   


 


3/28/18 20:00  85      


 


3/28/18 19:00  99      


 


3/28/18 16:00  96      


 


3/28/18 15:50 98.4 100 16 143/93 (110) 98   


 


3/28/18 15:50  100      


 


3/28/18 15:10  113  143/93    


 


3/28/18 15:00  104      


 


3/28/18 14:00  92      


 


3/28/18 13:00  80      


 


3/28/18 12:40  85      


 


3/28/18 12:40 98.3 85 16 132/70 (90) 96   














I/O      


 


 3/28/18 3/28/18 3/28/18 3/29/18 3/29/18 3/29/18





 07:00 15:00 23:00 07:00 15:00 23:00


 


Intake Total 0 ml 250 ml 1123 ml   


 


Output Total 900 ml     


 


Balance -900 ml 250 ml 1123 ml   


 


      


 


Intake Oral 0 ml  0 ml   


 


IV Total  50 ml 1123 ml   


 


Other  200 ml    


 


Output Urine Total 900 ml     


 


# Voids   3 2  


 


# Bowel Movements 3 1 1 1  








Result Diagram:  


3/29/18 0618                                                                   

             3/29/18 0618





Objective Remarks


GEN: Thin, malnourished patient resting comfortably in bed.  Patient is very 

difficult to understand but she does say words but not in a comprehensible 

manner. Soft restraints currently in place.


SKIN: PEG site clean and dry without erythema.


CV: Regular rate and rhythm without obvious murmurs


LUNGS: Anterior lung fields clear to auscultation bilaterally. Normal 

respiratory effort. No wheezes, rales, rhonchi.


GASTROINTESTINAL:  Abdomen soft, non-tender, nondistended. No hepato-

splenomegaly, or palpable masses. 


EXT: Bilateral feet in floaters. No edema. No calf tenderness.


NEURO/PSYCH: Awake, alert but confused.





A/P


Assessment and Plan


Patient is an 82 year old male with history of atrial fibrillation, dementia, 

failure to thrive, and recent orthopedic procedure of LLE who presents from 

[facility] for workup of gastrostomy tube malfunction. On ED evaluation she 

was noted to have atrial fibrillation with RVR to rate 150s that has been rate 

controlled with Cardizem drip.  


3/26: Per nurse, IR states Gastroenterology put in G-tube and thus 

Gastroenterology must be consulted. Will consult and call GI.


3/27: Gastrografin study showed patency of G tube into stomach. Patient 

continues to have pain with manipulation of the G-tube. Ct abd/pelvis ordered 

which showed G tube located in anterior abdominal wall (not in lumen of stomach)

, also showed common bile duct dilation with stone. Family consented for ERCP 

and G tube replacement


3/28: ERCP and G tube replacement with EGD


Discharge Planning


Likely tomorrow pending rate control with oral medications and tolerance to 

tube feeds. 


* Discharge home with home health care as family has hospital bed and hired a 

private CNA to care for patient





dw Dr. Jeffries, and Dr. Ferrara


Problem List:  


(1) Atrial fibrillation with RVR


ICD Codes:  I48.91 - Unspecified atrial fibrillation


Status:  Acute


Plan:  Patient diagnosed with atrial fibrillation with RVR during last 

hospitalization (Feb to Mar 2018). This was related to fall resulting in LLE 

fracture, with diagnosis failure to thrive. Afrib resumed likely due to 

inability to take medications from dysfunctional PEG. 


-Currently maintaining rate control with transition from drip to PO (Cardizem 

30mg PO q6). 


-aspirin for anticoagulation as family refused alternative medications due to 

fall risk.





(2) Malfunction of gastrostomy tube


ICD Codes:  K94.23 - Gastrostomy malfunction


Status:  Resolved


Plan:  IR initially consulted at admission. GI consulted 3/26 given they placed 

G tube in February. 


-CT abd/pelvis ordered on 3/27 showed end of G tube located in anterior 

abdominal wall (not in lumen of stomach). CBD also noted to be dilated


-3/28 EGD with PEG replacement and ERCP performed. 


-Okay to start tube feedings as per nutrition and flush PEG with 50cc of water 

q4-6hrs


* Nutrition recommendations: 


* TF Jevity 1.5 rec for bolus feedings:


 1. 240mls at 8am, 11am, 2pm, 5pm, 8pm


 2. 30mls before and after each bolus feeding


 3. 200mls free water flushes q 8 hrs to meet pt's fluid needs.


-fluids discontinued as tube feeds have been initiated


-Continue with protonix, abdominal binder as needed


-GI signed off 3/29





(3) Common bile duct dilatation


ICD Codes:  K83.8 - Other specified diseases of biliary tract


Plan:  CT abdomen on 3/27 showed common bile duct dilation to 14mm and small 

calcified stone in distal common duct


-EGD and ERCP on 3/28-Hiatal hernia, erythema in the cardia possible AVM, 

antral nodule, choledocholithiasis (2 stones extracted)


-CMP after procedure was unremarkable.





(4) Failure to thrive in adult


ICD Codes:  R62.7 - Adult failure to thrive


Status:  Chronic


Plan:  Aggressive measures desired by family


-Weight stable since last hospital discharge on 3/2/2018


-Nutritional consult for bolus feeding to reduce patient focus on G tube (

patient ordered for continuous feeds at time of discharge in early March)


* see rec's above





(5) Encounter for wound care


ICD Codes:  Z51.89 - Encounter for other specified aftercare


Status:  Acute


Plan:  She was noted to have left heel wound, ulcers 1st degree on buttocks


-wound care consulted, appreciate recommendations





(6) Dementia


ICD Codes:  F03.90 - Unspecified dementia without behavioral disturbance


Status:  Chronic


Plan:  Chronic severe dementia noted. Redirect, soft restraints ordered given 

patient pulled out IV and history of pulling at G-tube


-Health care surrogate per nursing home documentation is Prashanth Sidhu, phone 

number [phone], relationship is son-in-law.


-oral care ordered





(7) FEN/GI/PPx


Status:  Acute


Plan:  


Fluids: discontinued with as tube feeds have been resumed. 


Electrolytes: monitor and replete as needed


Nutrition: Tube feeds


DVT Prophylaxis: SCDs due to high fall risk


GI Prophylaxis: protonix


PRN anti-HTN: Vasotec 1.25mg qhr IV PRN for SBP > 180/ and/or DBP > 100














Lucia Huff MD, R3 Mar 29, 2018 12:35

## 2018-03-30 VITALS — HEART RATE: 94 BPM

## 2018-03-30 VITALS
SYSTOLIC BLOOD PRESSURE: 168 MMHG | OXYGEN SATURATION: 96 % | TEMPERATURE: 98.6 F | DIASTOLIC BLOOD PRESSURE: 92 MMHG | RESPIRATION RATE: 20 BRPM | HEART RATE: 107 BPM

## 2018-03-30 VITALS — RESPIRATION RATE: 18 BRPM | OXYGEN SATURATION: 98 % | HEART RATE: 94 BPM | TEMPERATURE: 97.8 F

## 2018-03-30 VITALS — HEART RATE: 100 BPM

## 2018-03-30 VITALS — HEART RATE: 112 BPM

## 2018-03-30 VITALS
RESPIRATION RATE: 19 BRPM | OXYGEN SATURATION: 97 % | DIASTOLIC BLOOD PRESSURE: 54 MMHG | SYSTOLIC BLOOD PRESSURE: 118 MMHG | TEMPERATURE: 97.2 F | HEART RATE: 89 BPM

## 2018-03-30 VITALS
TEMPERATURE: 98.5 F | SYSTOLIC BLOOD PRESSURE: 117 MMHG | DIASTOLIC BLOOD PRESSURE: 76 MMHG | HEART RATE: 77 BPM | RESPIRATION RATE: 20 BRPM | OXYGEN SATURATION: 98 %

## 2018-03-30 VITALS
TEMPERATURE: 98.2 F | RESPIRATION RATE: 18 BRPM | DIASTOLIC BLOOD PRESSURE: 70 MMHG | SYSTOLIC BLOOD PRESSURE: 119 MMHG | HEART RATE: 76 BPM | OXYGEN SATURATION: 98 %

## 2018-03-30 VITALS — HEART RATE: 102 BPM

## 2018-03-30 VITALS — HEART RATE: 90 BPM

## 2018-03-30 VITALS — HEART RATE: 89 BPM

## 2018-03-30 VITALS — HEART RATE: 75 BPM

## 2018-03-30 VITALS — HEART RATE: 76 BPM

## 2018-03-30 VITALS — HEART RATE: 96 BPM

## 2018-03-30 LAB
ALBUMIN SERPL-MCNC: 2.6 GM/DL (ref 3.4–5)
ALP SERPL-CCNC: 160 U/L (ref 45–117)
ALT SERPL-CCNC: 36 U/L (ref 10–53)
AST SERPL-CCNC: 29 U/L (ref 15–37)
BASOPHILS # BLD AUTO: 0.1 TH/MM3 (ref 0–0.2)
BASOPHILS NFR BLD: 0.5 % (ref 0–2)
BILIRUB SERPL-MCNC: 0.5 MG/DL (ref 0.2–1)
BUN SERPL-MCNC: 16 MG/DL (ref 7–18)
CALCIUM SERPL-MCNC: 8.8 MG/DL (ref 8.5–10.1)
CHLORIDE SERPL-SCNC: 104 MEQ/L (ref 98–107)
CREAT SERPL-MCNC: 0.43 MG/DL (ref 0.5–1)
EOSINOPHIL # BLD: 0.1 TH/MM3 (ref 0–0.4)
EOSINOPHIL NFR BLD: 1.1 % (ref 0–4)
ERYTHROCYTE [DISTWIDTH] IN BLOOD BY AUTOMATED COUNT: 17.1 % (ref 11.6–17.2)
GFR SERPLBLD BASED ON 1.73 SQ M-ARVRAT: 141 ML/MIN (ref 89–?)
GLUCOSE SERPL-MCNC: 86 MG/DL (ref 74–106)
HCO3 BLD-SCNC: 25.8 MEQ/L (ref 21–32)
HCT VFR BLD CALC: 33 % (ref 35–46)
HGB BLD-MCNC: 10.6 GM/DL (ref 11.6–15.3)
LYMPHOCYTES # BLD AUTO: 2 TH/MM3 (ref 1–4.8)
LYMPHOCYTES NFR BLD AUTO: 17.5 % (ref 9–44)
MCH RBC QN AUTO: 25.7 PG (ref 27–34)
MCHC RBC AUTO-ENTMCNC: 32.2 % (ref 32–36)
MCV RBC AUTO: 80 FL (ref 80–100)
MONOCYTE #: 0.7 TH/MM3 (ref 0–0.9)
MONOCYTES NFR BLD: 6.5 % (ref 0–8)
NEUTROPHILS # BLD AUTO: 8.4 TH/MM3 (ref 1.8–7.7)
NEUTROPHILS NFR BLD AUTO: 74.4 % (ref 16–70)
PLATELET # BLD: 389 TH/MM3 (ref 150–450)
PMV BLD AUTO: 9.6 FL (ref 7–11)
PROT SERPL-MCNC: 7.2 GM/DL (ref 6.4–8.2)
RBC # BLD AUTO: 4.12 MIL/MM3 (ref 4–5.3)
SODIUM SERPL-SCNC: 138 MEQ/L (ref 136–145)
WBC # BLD AUTO: 11.3 TH/MM3 (ref 4–11)

## 2018-03-30 RX ADMIN — Medication SCH ML: at 17:24

## 2018-03-30 RX ADMIN — STANDARDIZED SENNA CONCENTRATE AND DOCUSATE SODIUM SCH TAB: 8.6; 5 TABLET, FILM COATED ORAL at 09:00

## 2018-03-30 RX ADMIN — DILTIAZEM HYDROCHLORIDE SCH MG: 30 TABLET, FILM COATED ORAL at 06:47

## 2018-03-30 RX ADMIN — Medication SCH ML: at 09:00

## 2018-03-30 RX ADMIN — Medication SCH ML: at 06:00

## 2018-03-30 RX ADMIN — DILTIAZEM HYDROCHLORIDE SCH MG: 30 TABLET, FILM COATED ORAL at 17:24

## 2018-03-30 RX ADMIN — Medication SCH ML: at 12:00

## 2018-03-30 RX ADMIN — DILTIAZEM HYDROCHLORIDE SCH MG: 30 TABLET, FILM COATED ORAL at 14:45

## 2018-03-30 NOTE — HHI.DS
Discharge Summary


Admission Date


Mar 24, 2018 at 18:19


Discharge Date:  Mar 30, 2018


Admitting Diagnosis





afib with rvr, gtube malfunction.  hx of dementia





(1) Atrial fibrillation with RVR


ICD Codes:  I48.91 - Unspecified atrial fibrillation


Status:  Acute


(2) Malfunction of gastrostomy tube


ICD Codes:  K94.23 - Gastrostomy malfunction


Status:  Resolved


(3) Common bile duct dilatation


ICD Codes:  K83.8 - Other specified diseases of biliary tract


(4) H. pylori infection


ICD Codes:  A04.8 - Other specified bacterial intestinal infections


Brief History


Patient is an 82 year old female with history of severe dementia and G-tube 

malfunction who presents from [facility] for evaluation of G-tube 

malfunction.  History obtained from son-in-law over the phone.  





The son-in-law and daughter went to nursing home this morning to learn how to 

manage G-tube and the patient reportedly complained of pain and they noted 

redness in the area around the tube which concerned him.  They called the 

physician who recommended the patient be evaluated. They do note that the 

patient picks at the tube and she had a strap around her abdomen





On evaluation in ED she was noted to be in atrial fibrillation with RVR, with 

no noted history in nursing home documentation for this.  She was in rates of 

150s and required Cardizem with transition to Cardizem drip for rate control





She answers "[place]" and "yes" to all questions.  She notably appears to be 

confused. She is now on cardizem gtt with rate 90s-100s. Patient does not 

appear to be in pain.


CBC/BMP:  


3/30/18 0622                                                                   

             3/30/18 0622





Significant Findings





Laboratory Tests








Test


  3/28/18


06:40 3/29/18


06:18 3/30/18


06:22


 


Hemoglobin


  11.4 GM/DL


(11.6-15.3) 10.9 GM/DL


(11.6-15.3) 10.6 GM/DL


(11.6-15.3)


 


Hematocrit


  34.6 %


(35.0-46.0) 34.1 %


(35.0-46.0) 33.0 %


(35.0-46.0)


 


Mean Corpuscular Hemoglobin


  26.5 PG


(27.0-34.0) 25.8 PG


(27.0-34.0) 25.7 PG


(27.0-34.0)


 


Neutrophils (%) (Auto)


  71.1 %


(16.0-70.0) 


  74.4 %


(16.0-70.0)


 


Blood Urea Nitrogen 3 MG/DL (7-18)   


 


Creatinine


  0.45 MG/DL


(0.50-1.00) 


  0.43 MG/DL


(0.50-1.00)


 


Random Glucose


  108 MG/DL


() 137 MG/DL


() 


 


 


Albumin


  2.7 GM/DL


(3.4-5.0) 2.7 GM/DL


(3.4-5.0) 2.6 GM/DL


(3.4-5.0)


 


Alkaline Phosphatase


  178 U/L


() 184 U/L


() 160 U/L


()


 


Potassium Level


  3.4 MEQ/L


(3.5-5.1) 


  


 


 


White Blood Count


  


  14.0 TH/MM3


(4.0-11.0) 11.3 TH/MM3


(4.0-11.0)


 


Estimat Glomerular Filtration


Rate 


  81 ML/MIN


(>89) 


 


 


Neutrophils # (Auto)


  


  


  8.4 TH/MM3


(1.8-7.7)








PE at Discharge


GEN: Thin, malnourished patient resting comfortably in bed.  Patient is very 

difficult to understand but she does say words but not in a comprehensible 

manner. Soft restraints currently in place.


SKIN: PEG site clean and dry without erythema. - secured under abdominal binder 

prior to evaluating. 


CV: Regular rate and rhythm without obvious murmurs


LUNGS: Anterior lung fields clear to auscultation bilaterally. Normal 

respiratory effort. No wheezes, rales, rhonchi.


GASTROINTESTINAL:  Abdomen soft, non-tender, nondistended. No hepato-

splenomegaly, or palpable masses. 


EXT: Bilateral feet in floaters. No edema. No calf tenderness.


NEURO/PSYCH: Awake, alert but confused.


Hospital Course


Patient was admitted for G tube malfunction. Patient found to be in Afib with 

RVR and was initially started on a Cardizem drip. GI consulted 3/26 given they 

placed G tube in February. CT abd/pelvis ordered on 3/27 showed end of G tube 

located in anterior abdominal wall (not in lumen of stomach). Common bile duct 

dilation also noted.On 3/28 EGD with PEG replacement and ERCP performed. GI 

noted a H. Pylori infection from the EGD and she was started on quadruple 

therapy at discharge. Nutrition was consulted for recommendations on tube 

feeds. She tolerated tube feeds and PO medications in her G tube. She was 

discharged home as her family had arranged for a RN to assist with taking care 

of her at home.


Pt Condition on Discharge:  Good


Discharge Disposition:  Disch w/ Home Health Serv


Discharge Instructions


DIET: Follow Instructions for:  As Tolerated, No Restrictions


Speech Therapy-Diet Recommends:  Other


Additional Diet Instructions:  


G tube feeds as follows:





TF Jevity 1.5 rec for bolus feedings:





1. 240mls at 8am, 11am, 2pm, 5pm, 8pm





2. 30mls before and after each bolus feeding





3. 200mls free water flushes q 8 hrs to meet pt's fluid needs.


Activities you can perform:  Regular-No Restrictions


Follow up Referrals:  


Gastroenterology - 2 Weeks


PCP Follow-up - 1 Month





New Medications:  


Bismuth Subsalicylate (Bismuth Subsalicylate) 262 Mg Chew


262 MG PO QID, #56 TAB 0 Refills


Do not exceed 8 doses in 24 hours.


Lansoprazole ODT (Prevacid Solutab ODT) 30 Mg Tab


30 MG G-TUBE TID for Ulcer Prevention, #90 TAB 0 Refills


Mix with 4 ml water before giving via tube.


Metronidazole (Metronidazole) 250 Mg Tab


250 MG PO QID for Infection, #56 TAB 0 Refills





Tetracycline (Tetracycline) 250 Mg Cap


500 MG PO QID, #112 CAP 0 Refills





Diltiazem (Cardizem) 30 Mg Tab


30 MG PO Q6HR, #120 TAB





 


Continued Medications:  


Aspirin (Aspirin 81 Low Dose) 81 Mg Chew


81 MG CHEW DAILY, #30 TAB





Calcium Carbonate-Vitamin D (Calcium 600+D 200) 600-200 Mg-Unit Tab


1 TAB PO BID for Nutritional Supplement, #90 TAB 0 Refills





Ergocalciferol (Ergocalciferol) 50,000 Unit Cap


49285 UNITS PO Q7D for Nutritional Supplement, #8 CAP





Hydrocodone/Acetaminophen (Hydrocodone-Acetamin 5-325 mg) 5 Mg-325 Mg Tablet


1 TAB PEG Q6H, #30 0 Refills





 


Discontinued Medications:  


Enoxaparin Inj (Lovenox Inj) 30 Mg/0.3 Ml Syr


30 MG SQ DAILY for Blood Clot Prevention, #4 SYRINGE 0 Refills

















Talha Ferrara MD R1 Mar 30, 2018 12:05

## 2018-03-30 NOTE — HHI.DCPOC
Discharge Care Plan


Diagnosis:  


(1) Common bile duct dilatation


(2) Malfunction of gastrostomy tube


(3) Atrial fibrillation with RVR


(4) Dementia


Goals to Promote Your Health


* To prevent worsening of your condition and complications


* To maintain your health at the optimal level


Directions to Meet Your Goals


*** Take your medications as prescribed


*** Follow your dietary instruction


*** Follow activity as directed








*** Keep your appointments as scheduled


*** Take your immunizations and boosters as scheduled


*** If your symptoms worsen call your PCP, if no PCP go to Urgent Care Center 

or Emergency Room***


*** Smoking is Dangerous to Your Health. Avoid second hand smoke***


***Call the 24-hour hour crisis hotline for domestic abuse at 1-763.296.1213***











Talha Ferrara MD R1 Mar 30, 2018 09:03

## 2018-03-30 NOTE — HHI.FPPN
Subjective


Remarks


No acute events overnight. Patient has been tolerating tube feeds without 

difficulty. HR well controlled on po diltiazem via G tube. No change in 

mentation.





Objective


Vitals





Vital Signs








  Date Time  Temp Pulse Resp B/P (MAP) Pulse Ox O2 Delivery O2 Flow Rate FiO2


 


3/30/18 08:30 98.2 76 18 119/70 (86) 98   


 


3/30/18 04:06  75      


 


3/30/18 04:00 98.5 77 20 117/76 (90) 98   


 


3/30/18 00:07  112      


 


3/30/18 00:00 98.6 107 20 168/92 (117) 96   


 


3/29/18 20:00 97.8 96 20 115/76 (89) 99   


 


3/29/18 19:56  73      


 


3/29/18 17:00  96      


 


3/29/18 16:31 98.1 109 20 129/72 (91) 98   


 


3/29/18 16:00  91      


 


3/29/18 15:00  96      


 


3/29/18 14:00  124      


 


3/29/18 13:00  100      


 


3/29/18 12:02 98.1 88 20 122/77 (92) 98   


 


3/29/18 12:00  98      


 


3/29/18 11:00  88      


 


3/29/18 10:00  90      














I/O      


 


 3/29/18 3/29/18 3/29/18 3/30/18 3/30/18 3/30/18





 07:00 15:00 23:00 07:00 15:00 23:00


 


Intake Total  500 ml    


 


Balance  500 ml    


 


      


 


IV Total  500 ml    


 


# Voids 2   3  


 


# Bowel Movements 1   1  








Result Diagram:  


3/30/18 0622                                                                   

             3/30/18 0622





Objective Remarks


GEN: Thin, malnourished patient resting comfortably in bed.  Patient is very 

difficult to understand but she does say words but not in a comprehensible 

manner. Soft restraints currently in place.


SKIN: PEG site clean and dry without erythema. - secured under abdominal binder 

prior to evaluating. 


CV: Regular rate and rhythm without obvious murmurs


LUNGS: Anterior lung fields clear to auscultation bilaterally. Normal 

respiratory effort. No wheezes, rales, rhonchi.


GASTROINTESTINAL:  Abdomen soft, non-tender, nondistended. No hepato-

splenomegaly, or palpable masses. 


EXT: Bilateral feet in floaters. No edema. No calf tenderness.


NEURO/PSYCH: Awake, alert but confused.





A/P


Assessment and Plan


Patient is an 82 year old male with history of atrial fibrillation, dementia, 

failure to thrive, and recent orthopedic procedure of LLE who presents from 

[facility] for workup of gastrostomy tube malfunction. On ED evaluation she 

was noted to have atrial fibrillation with RVR to rate 150s that has been rate 

controlled with Cardizem drip.  


3/26: Per nurse, IR states Gastroenterology put in G-tube and thus 

Gastroenterology must be consulted. Will consult and call GI.


3/27: Gastrografin study showed patency of G tube into stomach. Patient 

continues to have pain with manipulation of the G-tube. Ct abd/pelvis ordered 

which showed G tube located in anterior abdominal wall (not in lumen of stomach)

, also showed common bile duct dilation with stone. Family consented for ERCP 

and G tube replacement


3/28: ERCP and G tube replacement with EGD


3/29: Tube feeds started, transitioned to PO medications via G tube


3/30: Discharge home. GI found H pylori infection during EGD and will be 

discharged on quadruple therapy (triple therapy with Clarithromycin is 

contraindicated in this patient as it can interact with calcium channel blockers

)


Discharge Planning


Discharge home today. Family has private CNA arranged to assist in her care. 

Working with case management for assistance today if needed. 





dw Dr. Jeffreis, and Dr. KATLYN Tobias


Problem List:  


(1) Atrial fibrillation with RVR


ICD Codes:  I48.91 - Unspecified atrial fibrillation


Status:  Acute


Plan:  Patient diagnosed with atrial fibrillation with RVR during last 

hospitalization (Feb to Mar 2018). This was related to fall resulting in LLE 

fracture, with diagnosis failure to thrive. Afrib resumed likely due to 

inability to take medications from dysfunctional PEG. 


-Currently maintaining rate control with transition from drip to PO (Cardizem 

30mg PO q6). 


-aspirin for anticoagulation as family refused alternative medications due to 

fall risk.


-Well controlled overnight on PO Cardizem - ok to discharge on current regimen





(2) Malfunction of gastrostomy tube


ICD Codes:  K94.23 - Gastrostomy malfunction


Status:  Resolved


Plan:  IR initially consulted at admission. GI consulted 3/26 given they placed 

G tube in February. 


-CT abd/pelvis ordered on 3/27 showed end of G tube located in anterior 

abdominal wall (not in lumen of stomach). CBD also noted to be dilated


-3/28 EGD with PEG replacement and ERCP performed. 


-Okay to start tube feedings as per nutrition and flush PEG with 50cc of water 

q4-6hrs


* Nutrition recommendations: 


* TF Jevity 1.5 rec for bolus feedings:


 1. 240mls at 8am, 11am, 2pm, 5pm, 8pm


 2. 30mls before and after each bolus feeding


 3. 200mls free water flushes q 8 hrs to meet pt's fluid needs.


-fluids discontinued as tube feeds have been initiated


-Continue with protonix, abdominal binder as needed


-GI signed off 3/29


-Tolerated tube feeds/po meds in G tube well - ok to DC





(3) Common bile duct dilatation


ICD Codes:  K83.8 - Other specified diseases of biliary tract


Plan:  CT abdomen on 3/27 showed common bile duct dilation to 14mm and small 

calcified stone in distal common duct


-EGD and ERCP on 3/28-Hiatal hernia, erythema in the cardia possible AVM, 

antral nodule, choledocholithiasis (2 stones extracted)


-CMP, Lipase after procedure was unremarkable.


-Follow up with GI in 2 weeks





(4) H. pylori infection


ICD Codes:  A04.8 - Other specified bacterial intestinal infections


Plan:  GI noted H. Pylori infection found during EGD


Recommended triple therapy with Clarithromycin, but interaction with calcium 

channel blockers is noted 


-Will prescribe quadruple therapy as follows:


   Pantoprazole 40mg po BID for 2 weeks


   Flagyl 250mg po QID for 2 weeks


   Tetracycline 500mg po QID for 2 weeks


   Bismuth 262mg po QID for 2 weeks





Follow up with GI in 2 weeks





(5) Failure to thrive in adult


ICD Codes:  R62.7 - Adult failure to thrive


Status:  Chronic


Plan:  Aggressive measures desired by family


-Weight stable since last hospital discharge on 3/2/2018


-Nutritional consult for bolus feeding to reduce patient focus on G tube (

patient ordered for continuous feeds at time of discharge in early March)


* see rec's above





(6) Encounter for wound care


ICD Codes:  Z51.89 - Encounter for other specified aftercare


Status:  Acute


Plan:  She was noted to have left heel wound, ulcers 1st degree on buttocks


-wound care consulted, appreciate recommendations





(7) Dementia


ICD Codes:  F03.90 - Unspecified dementia without behavioral disturbance


Status:  Chronic


Plan:  Chronic severe dementia noted. Redirect, soft restraints ordered given 

patient pulled out IV and history of pulling at G-tube


-Health care surrogate per nursing home documentation is Prashanht Sidhu, phone 

number [phone], relationship is son-in-law.


-oral care ordered





(8) FEN/GI/PPx


Status:  Acute


Plan:  


Fluids: discontinued with as tube feeds have been resumed. 


Electrolytes: monitor and replete as needed


Nutrition: Tube feeds


DVT Prophylaxis: SCDs due to high fall risk


GI Prophylaxis:  Pantoprazole


PRN anti-HTN: Vasotec 1.25mg qhr IV PRN for SBP > 180/ and/or DBP > 100














Talha Ferrara MD R1 Mar 30, 2018 09:21

## 2018-03-30 NOTE — HHI.FF
Face to Face Verification


Diagnosis:  


(1) Malfunction of gastrostomy tube


(2) Atrial fibrillation with RVR


(3) Dementia


Home Health Nursing








Order: Medical education





 Signs/symptoms of disease process





 Wound care and dressing changes








Instructions:


G tube feeds





TF Jevity 1.5 rec for bolus feedings:


 1. 240mls at 8am, 11am, 2pm, 5pm, 8pm


 2. 30mls before and after each bolus feeding


 3. 200mls free water flushes q 8 hrs to meet pt's fluid needs.











I have seen patient Chayito Alvarenga on 3/30/18. My clinical findings support the 

need for the requested home health care services because:








 Ltd mobility - disease progression





 Deconditioned w/ increased weakness





 Med compliance is questionable





 Limited ability to care for self





 Impaired cognition/judgement





 High risk of falls





 Injectable med education/admin (G tube feeds and meds per G tube)














I certify that my clinical findings support that this patient is homebound 

because:








 Impaired cognitive ability/safety





 Unsteady gait/balance





 Unsafe to leave home unassisted





 Unable to use public transportation

















Talha Ferrara MD R1 Mar 30, 2018 09:02